# Patient Record
Sex: FEMALE | Race: ASIAN | NOT HISPANIC OR LATINO | URBAN - METROPOLITAN AREA
[De-identification: names, ages, dates, MRNs, and addresses within clinical notes are randomized per-mention and may not be internally consistent; named-entity substitution may affect disease eponyms.]

---

## 2017-05-19 ENCOUNTER — OUTPATIENT (OUTPATIENT)
Dept: OUTPATIENT SERVICES | Facility: HOSPITAL | Age: 57
LOS: 1 days | End: 2017-05-19
Payer: COMMERCIAL

## 2017-05-19 PROCEDURE — 73562 X-RAY EXAM OF KNEE 3: CPT

## 2017-05-19 PROCEDURE — 73562 X-RAY EXAM OF KNEE 3: CPT | Mod: 26,50

## 2017-06-07 ENCOUNTER — OUTPATIENT (OUTPATIENT)
Dept: OUTPATIENT SERVICES | Facility: HOSPITAL | Age: 57
LOS: 1 days | End: 2017-06-07
Payer: COMMERCIAL

## 2017-06-07 PROCEDURE — 77080 DXA BONE DENSITY AXIAL: CPT | Mod: 26

## 2017-06-07 PROCEDURE — 77080 DXA BONE DENSITY AXIAL: CPT

## 2017-08-24 ENCOUNTER — APPOINTMENT (OUTPATIENT)
Dept: OPHTHALMOLOGY | Facility: CLINIC | Age: 57
End: 2017-08-24

## 2017-10-30 ENCOUNTER — APPOINTMENT (OUTPATIENT)
Dept: ORTHOPEDIC SURGERY | Facility: CLINIC | Age: 57
End: 2017-10-30
Payer: COMMERCIAL

## 2017-10-30 VITALS — BODY MASS INDEX: 26 KG/M2 | WEIGHT: 129 LBS | HEIGHT: 59 IN

## 2017-10-30 VITALS — DIASTOLIC BLOOD PRESSURE: 78 MMHG | HEART RATE: 80 BPM | SYSTOLIC BLOOD PRESSURE: 125 MMHG

## 2017-10-30 DIAGNOSIS — Z85.9 PERSONAL HISTORY OF MALIGNANT NEOPLASM, UNSPECIFIED: ICD-10-CM

## 2017-10-30 DIAGNOSIS — M25.562 PAIN IN RIGHT KNEE: ICD-10-CM

## 2017-10-30 DIAGNOSIS — Z78.9 OTHER SPECIFIED HEALTH STATUS: ICD-10-CM

## 2017-10-30 DIAGNOSIS — Z87.39 PERSONAL HISTORY OF OTHER DISEASES OF THE MUSCULOSKELETAL SYSTEM AND CONNECTIVE TISSUE: ICD-10-CM

## 2017-10-30 DIAGNOSIS — M25.561 PAIN IN RIGHT KNEE: ICD-10-CM

## 2017-10-30 PROCEDURE — 73564 X-RAY EXAM KNEE 4 OR MORE: CPT | Mod: 50

## 2017-10-30 PROCEDURE — 20610 DRAIN/INJ JOINT/BURSA W/O US: CPT | Mod: 50

## 2017-10-30 PROCEDURE — 99203 OFFICE O/P NEW LOW 30 MIN: CPT | Mod: 25

## 2017-11-27 ENCOUNTER — APPOINTMENT (OUTPATIENT)
Dept: ORTHOPEDIC SURGERY | Facility: CLINIC | Age: 57
End: 2017-11-27

## 2017-11-28 ENCOUNTER — RX RENEWAL (OUTPATIENT)
Age: 57
End: 2017-11-28

## 2017-12-11 ENCOUNTER — APPOINTMENT (OUTPATIENT)
Dept: ORTHOPEDIC SURGERY | Facility: CLINIC | Age: 57
End: 2017-12-11
Payer: COMMERCIAL

## 2017-12-11 VITALS — WEIGHT: 129 LBS | BODY MASS INDEX: 26 KG/M2 | HEIGHT: 59 IN

## 2017-12-11 PROCEDURE — 99213 OFFICE O/P EST LOW 20 MIN: CPT

## 2017-12-18 ENCOUNTER — APPOINTMENT (OUTPATIENT)
Dept: ORTHOPEDIC SURGERY | Facility: CLINIC | Age: 57
End: 2017-12-18
Payer: COMMERCIAL

## 2017-12-18 VITALS
HEIGHT: 59 IN | HEART RATE: 105 BPM | SYSTOLIC BLOOD PRESSURE: 131 MMHG | WEIGHT: 130 LBS | BODY MASS INDEX: 26.21 KG/M2 | DIASTOLIC BLOOD PRESSURE: 84 MMHG

## 2017-12-18 PROCEDURE — 99214 OFFICE O/P EST MOD 30 MIN: CPT

## 2017-12-22 ENCOUNTER — APPOINTMENT (OUTPATIENT)
Dept: ORTHOPEDIC SURGERY | Facility: CLINIC | Age: 57
End: 2017-12-22

## 2017-12-26 ENCOUNTER — APPOINTMENT (OUTPATIENT)
Dept: ORTHOPEDIC SURGERY | Facility: CLINIC | Age: 57
End: 2017-12-26

## 2017-12-30 ENCOUNTER — OUTPATIENT (OUTPATIENT)
Dept: OUTPATIENT SERVICES | Facility: HOSPITAL | Age: 57
LOS: 1 days | End: 2017-12-30
Payer: COMMERCIAL

## 2017-12-30 PROCEDURE — 73221 MRI JOINT UPR EXTREM W/O DYE: CPT | Mod: 26,RT

## 2017-12-30 PROCEDURE — 73221 MRI JOINT UPR EXTREM W/O DYE: CPT

## 2018-01-02 ENCOUNTER — APPOINTMENT (OUTPATIENT)
Dept: ORTHOPEDIC SURGERY | Facility: CLINIC | Age: 58
End: 2018-01-02

## 2018-01-22 ENCOUNTER — APPOINTMENT (OUTPATIENT)
Dept: SURGERY | Facility: CLINIC | Age: 58
End: 2018-01-22

## 2018-02-05 ENCOUNTER — APPOINTMENT (OUTPATIENT)
Dept: ORTHOPEDIC SURGERY | Facility: CLINIC | Age: 58
End: 2018-02-05
Payer: COMMERCIAL

## 2018-02-05 VITALS — WEIGHT: 130 LBS | BODY MASS INDEX: 26.21 KG/M2 | HEIGHT: 59 IN

## 2018-02-05 PROCEDURE — 20610 DRAIN/INJ JOINT/BURSA W/O US: CPT | Mod: 50

## 2018-02-06 ENCOUNTER — APPOINTMENT (OUTPATIENT)
Dept: ORTHOPEDIC SURGERY | Facility: HOSPITAL | Age: 58
End: 2018-02-06

## 2018-02-12 ENCOUNTER — APPOINTMENT (OUTPATIENT)
Dept: ORTHOPEDIC SURGERY | Facility: CLINIC | Age: 58
End: 2018-02-12
Payer: COMMERCIAL

## 2018-02-12 VITALS — WEIGHT: 130 LBS | HEIGHT: 59 IN | BODY MASS INDEX: 26.21 KG/M2

## 2018-02-12 VITALS — WEIGHT: 130 LBS | BODY MASS INDEX: 26.21 KG/M2 | HEIGHT: 59 IN

## 2018-02-12 PROCEDURE — 20610 DRAIN/INJ JOINT/BURSA W/O US: CPT | Mod: 50

## 2018-02-20 ENCOUNTER — APPOINTMENT (OUTPATIENT)
Dept: MAMMOGRAPHY | Facility: HOSPITAL | Age: 58
End: 2018-02-20

## 2018-02-20 ENCOUNTER — APPOINTMENT (OUTPATIENT)
Dept: ULTRASOUND IMAGING | Facility: HOSPITAL | Age: 58
End: 2018-02-20

## 2018-02-26 ENCOUNTER — APPOINTMENT (OUTPATIENT)
Dept: ORTHOPEDIC SURGERY | Facility: CLINIC | Age: 58
End: 2018-02-26
Payer: COMMERCIAL

## 2018-02-26 VITALS — BODY MASS INDEX: 26.21 KG/M2 | HEIGHT: 59 IN | WEIGHT: 130 LBS

## 2018-02-26 PROCEDURE — 20610 DRAIN/INJ JOINT/BURSA W/O US: CPT | Mod: 50

## 2018-03-13 RX ORDER — TRAMADOL HYDROCHLORIDE 50 MG/1
50 TABLET, COATED ORAL
Qty: 40 | Refills: 0 | Status: DISCONTINUED | OUTPATIENT
Start: 2018-03-12 | End: 2018-03-13

## 2018-04-02 ENCOUNTER — OUTPATIENT (OUTPATIENT)
Dept: OUTPATIENT SERVICES | Facility: HOSPITAL | Age: 58
LOS: 1 days | End: 2018-04-02
Payer: COMMERCIAL

## 2018-04-02 PROCEDURE — 73120 X-RAY EXAM OF HAND: CPT

## 2018-04-02 PROCEDURE — 73120 X-RAY EXAM OF HAND: CPT | Mod: 26,50

## 2018-07-02 ENCOUNTER — OUTPATIENT (OUTPATIENT)
Dept: OUTPATIENT SERVICES | Facility: HOSPITAL | Age: 58
LOS: 1 days | End: 2018-07-02
Payer: COMMERCIAL

## 2018-07-02 ENCOUNTER — APPOINTMENT (OUTPATIENT)
Dept: MAMMOGRAPHY | Facility: HOSPITAL | Age: 58
End: 2018-07-02
Payer: COMMERCIAL

## 2018-07-02 ENCOUNTER — APPOINTMENT (OUTPATIENT)
Dept: ULTRASOUND IMAGING | Facility: HOSPITAL | Age: 58
End: 2018-07-02
Payer: COMMERCIAL

## 2018-07-02 PROCEDURE — 77067 SCR MAMMO BI INCL CAD: CPT | Mod: 26

## 2018-07-02 PROCEDURE — 76641 ULTRASOUND BREAST COMPLETE: CPT | Mod: 26,50

## 2018-07-02 PROCEDURE — 77063 BREAST TOMOSYNTHESIS BI: CPT | Mod: 26

## 2018-07-02 PROCEDURE — 77063 BREAST TOMOSYNTHESIS BI: CPT

## 2018-07-02 PROCEDURE — 77067 SCR MAMMO BI INCL CAD: CPT

## 2018-07-02 PROCEDURE — 76641 ULTRASOUND BREAST COMPLETE: CPT

## 2018-07-09 ENCOUNTER — APPOINTMENT (OUTPATIENT)
Dept: ORTHOPEDIC SURGERY | Facility: CLINIC | Age: 58
End: 2018-07-09
Payer: COMMERCIAL

## 2018-07-09 VITALS
HEIGHT: 59 IN | HEART RATE: 80 BPM | DIASTOLIC BLOOD PRESSURE: 84 MMHG | WEIGHT: 130 LBS | BODY MASS INDEX: 26.21 KG/M2 | SYSTOLIC BLOOD PRESSURE: 129 MMHG

## 2018-07-09 DIAGNOSIS — M21.161 VARUS DEFORMITY, NOT ELSEWHERE CLASSIFIED, RIGHT KNEE: ICD-10-CM

## 2018-07-09 DIAGNOSIS — M25.562 PAIN IN RIGHT KNEE: ICD-10-CM

## 2018-07-09 DIAGNOSIS — M21.162 VARUS DEFORMITY, NOT ELSEWHERE CLASSIFIED, LEFT KNEE: ICD-10-CM

## 2018-07-09 DIAGNOSIS — M25.561 PAIN IN RIGHT KNEE: ICD-10-CM

## 2018-07-09 PROCEDURE — 99214 OFFICE O/P EST MOD 30 MIN: CPT

## 2018-07-09 PROCEDURE — 73564 X-RAY EXAM KNEE 4 OR MORE: CPT | Mod: 50

## 2018-07-18 ENCOUNTER — OUTPATIENT (OUTPATIENT)
Dept: OUTPATIENT SERVICES | Facility: HOSPITAL | Age: 58
LOS: 1 days | End: 2018-07-18
Payer: COMMERCIAL

## 2018-07-18 DIAGNOSIS — Z22.321 CARRIER OR SUSPECTED CARRIER OF METHICILLIN SUSCEPTIBLE STAPHYLOCOCCUS AUREUS: ICD-10-CM

## 2018-07-18 PROCEDURE — 87641 MR-STAPH DNA AMP PROBE: CPT

## 2018-07-19 LAB
MRSA PCR RESULT.: NEGATIVE — SIGNIFICANT CHANGE UP
S AUREUS DNA NOSE QL NAA+PROBE: NEGATIVE — SIGNIFICANT CHANGE UP

## 2018-08-01 ENCOUNTER — RX RENEWAL (OUTPATIENT)
Age: 58
End: 2018-08-01

## 2018-08-15 ENCOUNTER — APPOINTMENT (OUTPATIENT)
Dept: SURGERY | Facility: CLINIC | Age: 58
End: 2018-08-15

## 2018-08-15 ENCOUNTER — OUTPATIENT (OUTPATIENT)
Dept: OUTPATIENT SERVICES | Facility: HOSPITAL | Age: 58
LOS: 1 days | End: 2018-08-15
Payer: COMMERCIAL

## 2018-08-15 VITALS
OXYGEN SATURATION: 97 % | TEMPERATURE: 99 F | HEART RATE: 65 BPM | SYSTOLIC BLOOD PRESSURE: 135 MMHG | DIASTOLIC BLOOD PRESSURE: 85 MMHG | WEIGHT: 126.1 LBS | RESPIRATION RATE: 16 BRPM

## 2018-08-15 DIAGNOSIS — Z98.890 OTHER SPECIFIED POSTPROCEDURAL STATES: Chronic | ICD-10-CM

## 2018-08-15 DIAGNOSIS — Z41.9 ENCOUNTER FOR PROCEDURE FOR PURPOSES OTHER THAN REMEDYING HEALTH STATE, UNSPECIFIED: Chronic | ICD-10-CM

## 2018-08-15 DIAGNOSIS — M17.0 BILATERAL PRIMARY OSTEOARTHRITIS OF KNEE: ICD-10-CM

## 2018-08-15 DIAGNOSIS — Z01.818 ENCOUNTER FOR OTHER PREPROCEDURAL EXAMINATION: ICD-10-CM

## 2018-08-15 LAB
ALBUMIN SERPL ELPH-MCNC: 3.9 G/DL — SIGNIFICANT CHANGE UP (ref 3.3–5)
ALP SERPL-CCNC: 49 U/L — SIGNIFICANT CHANGE UP (ref 40–120)
ALT FLD-CCNC: 10 U/L — SIGNIFICANT CHANGE UP (ref 10–45)
ANION GAP SERPL CALC-SCNC: 9 MMOL/L — SIGNIFICANT CHANGE UP (ref 5–17)
APPEARANCE UR: CLEAR — SIGNIFICANT CHANGE UP
APTT BLD: 31.1 SEC — SIGNIFICANT CHANGE UP (ref 27.5–37.4)
AST SERPL-CCNC: 13 U/L — SIGNIFICANT CHANGE UP (ref 10–40)
BACTERIA # UR AUTO: PRESENT /HPF
BILIRUB SERPL-MCNC: 0.6 MG/DL — SIGNIFICANT CHANGE UP (ref 0.2–1.2)
BILIRUB UR-MCNC: NEGATIVE — SIGNIFICANT CHANGE UP
BUN SERPL-MCNC: 16 MG/DL — SIGNIFICANT CHANGE UP (ref 7–23)
CALCIUM SERPL-MCNC: 9.5 MG/DL — SIGNIFICANT CHANGE UP (ref 8.4–10.5)
CHLORIDE SERPL-SCNC: 104 MMOL/L — SIGNIFICANT CHANGE UP (ref 96–108)
CO2 SERPL-SCNC: 31 MMOL/L — SIGNIFICANT CHANGE UP (ref 22–31)
COLOR SPEC: YELLOW — SIGNIFICANT CHANGE UP
CREAT SERPL-MCNC: 0.64 MG/DL — SIGNIFICANT CHANGE UP (ref 0.5–1.3)
DIFF PNL FLD: ABNORMAL
EPI CELLS # UR: SIGNIFICANT CHANGE UP /HPF (ref 0–5)
GLUCOSE SERPL-MCNC: 91 MG/DL — SIGNIFICANT CHANGE UP (ref 70–99)
GLUCOSE UR QL: NEGATIVE — SIGNIFICANT CHANGE UP
HCT VFR BLD CALC: 44.6 % — SIGNIFICANT CHANGE UP (ref 34.5–45)
HGB BLD-MCNC: 14.2 G/DL — SIGNIFICANT CHANGE UP (ref 11.5–15.5)
INR BLD: 0.96 — SIGNIFICANT CHANGE UP (ref 0.88–1.16)
KETONES UR-MCNC: NEGATIVE — SIGNIFICANT CHANGE UP
LEUKOCYTE ESTERASE UR-ACNC: NEGATIVE — SIGNIFICANT CHANGE UP
MCHC RBC-ENTMCNC: 31.8 G/DL — LOW (ref 32–36)
MCHC RBC-ENTMCNC: 31.8 PG — SIGNIFICANT CHANGE UP (ref 27–34)
MCV RBC AUTO: 100 FL — SIGNIFICANT CHANGE UP (ref 80–100)
NITRITE UR-MCNC: NEGATIVE — SIGNIFICANT CHANGE UP
PH UR: 6 — SIGNIFICANT CHANGE UP (ref 5–8)
PLATELET # BLD AUTO: 220 K/UL — SIGNIFICANT CHANGE UP (ref 150–400)
POTASSIUM SERPL-MCNC: 4 MMOL/L — SIGNIFICANT CHANGE UP (ref 3.5–5.3)
POTASSIUM SERPL-SCNC: 4 MMOL/L — SIGNIFICANT CHANGE UP (ref 3.5–5.3)
PROT SERPL-MCNC: 6.4 G/DL — SIGNIFICANT CHANGE UP (ref 6–8.3)
PROT UR-MCNC: NEGATIVE MG/DL — SIGNIFICANT CHANGE UP
PROTHROM AB SERPL-ACNC: 10.7 SEC — SIGNIFICANT CHANGE UP (ref 9.8–12.7)
RBC # BLD: 4.46 M/UL — SIGNIFICANT CHANGE UP (ref 3.8–5.2)
RBC # FLD: 13.2 % — SIGNIFICANT CHANGE UP (ref 10.3–16.9)
RBC CASTS # UR COMP ASSIST: < 5 /HPF — SIGNIFICANT CHANGE UP
SODIUM SERPL-SCNC: 144 MMOL/L — SIGNIFICANT CHANGE UP (ref 135–145)
SP GR SPEC: 1.02 — SIGNIFICANT CHANGE UP (ref 1–1.03)
UROBILINOGEN FLD QL: 0.2 E.U./DL — SIGNIFICANT CHANGE UP
WBC # BLD: 7.1 K/UL — SIGNIFICANT CHANGE UP (ref 3.8–10.5)
WBC # FLD AUTO: 7.1 K/UL — SIGNIFICANT CHANGE UP (ref 3.8–10.5)
WBC UR QL: < 5 /HPF — SIGNIFICANT CHANGE UP

## 2018-08-15 PROCEDURE — 71046 X-RAY EXAM CHEST 2 VIEWS: CPT | Mod: 26

## 2018-08-15 NOTE — H&P PST ADULT - HISTORY OF PRESENT ILLNESS
Patient reports she began having bilateral knee pain about 2 years ago. She reports she is unable to negotiate stairs. Has severe pain when changing position in her sleep. She notes reduced range of motion in both knees because of pain.

## 2018-08-15 NOTE — H&P PST ADULT - FAMILY HISTORY
Father  Still living? No  Hypertension, Age at diagnosis: Age Unknown  Type 2 diabetes mellitus, Age at diagnosis: Age Unknown  Hypercholesterolemia, Age at diagnosis: Age Unknown

## 2018-08-15 NOTE — PATIENT PROFILE ADULT. - PMH
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

## 2018-08-15 NOTE — H&P PST ADULT - NSANTHOSAYNRD_GEN_A_CORE
No. MEÑO screening performed.  STOP BANG Legend: 0-2 = LOW Risk; 3-4 = INTERMEDIATE Risk; 5-8 = HIGH Risk

## 2018-08-15 NOTE — H&P PST ADULT - PMH
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

## 2018-08-15 NOTE — H&P PST ADULT - REASON FOR ADMISSION
Bilateral knees degenerative arthritis, genu varum deformity. Patient is to have  bilateral total knee arthroplasty.

## 2018-08-15 NOTE — PATIENT PROFILE ADULT. - PSH
History of hernia repair  2010  Other postprocedural status  S/P appendectomy  Status post total hysterectomy  S/P DENNIS-BSO (total abdominal hysterectomy and bilateral salpingo-oophorectomy)  Surgery, elective  left shoulder arthroscopy- 2016

## 2018-08-16 PROBLEM — J45.909 UNSPECIFIED ASTHMA, UNCOMPLICATED: Chronic | Status: ACTIVE | Noted: 2018-08-15

## 2018-08-16 PROBLEM — I05.9 RHEUMATIC MITRAL VALVE DISEASE, UNSPECIFIED: Chronic | Status: ACTIVE | Noted: 2018-08-15

## 2018-08-16 PROBLEM — K21.9 GASTRO-ESOPHAGEAL REFLUX DISEASE WITHOUT ESOPHAGITIS: Chronic | Status: ACTIVE | Noted: 2018-08-15

## 2018-08-16 LAB
CULTURE RESULTS: NO GROWTH — SIGNIFICANT CHANGE UP
SPECIMEN SOURCE: SIGNIFICANT CHANGE UP

## 2018-08-27 VITALS
OXYGEN SATURATION: 99 % | WEIGHT: 127.43 LBS | TEMPERATURE: 98 F | SYSTOLIC BLOOD PRESSURE: 132 MMHG | HEIGHT: 56 IN | DIASTOLIC BLOOD PRESSURE: 83 MMHG | HEART RATE: 72 BPM | RESPIRATION RATE: 16 BRPM

## 2018-08-27 RX ORDER — POLYETHYLENE GLYCOL 3350 17 G/17G
17 POWDER, FOR SOLUTION ORAL DAILY
Qty: 0 | Refills: 0 | Status: DISCONTINUED | OUTPATIENT
Start: 2018-08-28 | End: 2018-08-31

## 2018-08-27 RX ORDER — HYDROMORPHONE HYDROCHLORIDE 2 MG/ML
0.5 INJECTION INTRAMUSCULAR; INTRAVENOUS; SUBCUTANEOUS EVERY 4 HOURS
Qty: 0 | Refills: 0 | Status: DISCONTINUED | OUTPATIENT
Start: 2018-08-28 | End: 2018-08-31

## 2018-08-27 RX ORDER — LOSARTAN POTASSIUM 100 MG/1
50 TABLET, FILM COATED ORAL DAILY
Qty: 0 | Refills: 0 | Status: DISCONTINUED | OUTPATIENT
Start: 2018-08-28 | End: 2018-08-31

## 2018-08-27 RX ORDER — SENNA PLUS 8.6 MG/1
2 TABLET ORAL AT BEDTIME
Qty: 0 | Refills: 0 | Status: DISCONTINUED | OUTPATIENT
Start: 2018-08-28 | End: 2018-08-31

## 2018-08-27 RX ORDER — MAGNESIUM HYDROXIDE 400 MG/1
30 TABLET, CHEWABLE ORAL DAILY
Qty: 0 | Refills: 0 | Status: DISCONTINUED | OUTPATIENT
Start: 2018-08-28 | End: 2018-08-31

## 2018-08-27 RX ORDER — LOSARTAN POTASSIUM 100 MG/1
0 TABLET, FILM COATED ORAL
Qty: 0 | Refills: 0 | COMMUNITY

## 2018-08-27 RX ORDER — ONDANSETRON 8 MG/1
4 TABLET, FILM COATED ORAL EVERY 6 HOURS
Qty: 0 | Refills: 0 | Status: DISCONTINUED | OUTPATIENT
Start: 2018-08-28 | End: 2018-08-31

## 2018-08-27 RX ORDER — PANTOPRAZOLE SODIUM 20 MG/1
40 TABLET, DELAYED RELEASE ORAL DAILY
Qty: 0 | Refills: 0 | Status: DISCONTINUED | OUTPATIENT
Start: 2018-08-28 | End: 2018-08-31

## 2018-08-27 RX ORDER — ENOXAPARIN SODIUM 100 MG/ML
40 INJECTION SUBCUTANEOUS EVERY 24 HOURS
Qty: 0 | Refills: 0 | Status: DISCONTINUED | OUTPATIENT
Start: 2018-08-29 | End: 2018-08-31

## 2018-08-27 RX ORDER — RANITIDINE HYDROCHLORIDE 150 MG/1
0 TABLET, FILM COATED ORAL
Qty: 0 | Refills: 0 | COMMUNITY

## 2018-08-27 RX ORDER — DOCUSATE SODIUM 100 MG
100 CAPSULE ORAL THREE TIMES A DAY
Qty: 0 | Refills: 0 | Status: DISCONTINUED | OUTPATIENT
Start: 2018-08-28 | End: 2018-08-31

## 2018-08-27 RX ORDER — ATORVASTATIN CALCIUM 80 MG/1
1 TABLET, FILM COATED ORAL
Qty: 0 | Refills: 0 | COMMUNITY

## 2018-08-27 RX ORDER — OXYCODONE HYDROCHLORIDE 5 MG/1
10 TABLET ORAL EVERY 4 HOURS
Qty: 0 | Refills: 0 | Status: DISCONTINUED | OUTPATIENT
Start: 2018-08-28 | End: 2018-08-31

## 2018-08-27 RX ORDER — SODIUM CHLORIDE 9 MG/ML
1000 INJECTION, SOLUTION INTRAVENOUS
Qty: 0 | Refills: 0 | Status: DISCONTINUED | OUTPATIENT
Start: 2018-08-28 | End: 2018-08-31

## 2018-08-27 RX ORDER — OXYCODONE HYDROCHLORIDE 5 MG/1
5 TABLET ORAL EVERY 4 HOURS
Qty: 0 | Refills: 0 | Status: DISCONTINUED | OUTPATIENT
Start: 2018-08-28 | End: 2018-08-31

## 2018-08-27 RX ORDER — ACETAMINOPHEN 500 MG
975 TABLET ORAL EVERY 8 HOURS
Qty: 0 | Refills: 0 | Status: DISCONTINUED | OUTPATIENT
Start: 2018-08-28 | End: 2018-08-31

## 2018-08-27 NOTE — H&P ADULT - NSHPLABSRESULTS_GEN_ALL_CORE
Left knee x-rays show severe tricompartmental DJD, medial joint space narrowing, sclerosis, marginal osteophytes present, varus deformity, lateral tibial subluxation, Kellgren and amina grade 4.  Right knee x-rays show severe tricompartmental DJD, medial joint space narrowing, sclerosis, marginal osteophytes present, varus deformity, lateral tibial subluxation, Kellgren and amina grade 4.

## 2018-08-27 NOTE — H&P ADULT - REASON FOR ADMISSION
Bilateral knee severe Arthritis with pain, swelling and decreased mobility for the last couple of years.

## 2018-08-27 NOTE — H&P ADULT - HISTORY OF PRESENT ILLNESS
57 y.o. F with h/o GERD, HTN, Hyperlipidemia and Bilateral Knee severe DJD with pain, swelling and decreased ambulation for many years presents at [facility] for her Bilateral TKR with Dr. Gardner.

## 2018-08-27 NOTE — PATIENT PROFILE ADULT. - PSH
History of hernia repair  2010  Other postprocedural status  S/P appendectomy  Status post total hysterectomy  S/P DENNIS-BSO (total abdominal hysterectomy and bilateral salpingo-oophorectomy)  Surgery, elective  left shoulder arthroscopy- 2016 History of hernia repair  2010  Other postprocedural status  S/P appendectomy  Status post total hysterectomy  S/P DENNIS-BSO (total abdominal hysterectomy and bilateral salpingo-oophorectomy)  Surgery, elective  left shoulder arthroscopy- 2016  BILAT SHOULDER

## 2018-08-27 NOTE — PATIENT PROFILE ADULT. - TEACHING/LEARNING LEARNING PREFERENCES
verbal instruction/written material/individual instruction written material/individual instruction/skill demonstration/verbal instruction

## 2018-08-28 ENCOUNTER — RESULT REVIEW (OUTPATIENT)
Age: 58
End: 2018-08-28

## 2018-08-28 ENCOUNTER — INPATIENT (INPATIENT)
Facility: HOSPITAL | Age: 58
LOS: 2 days | Discharge: NURSING, REHAB OR LONG-TERM CARE | DRG: 462 | End: 2018-08-31
Attending: ORTHOPAEDIC SURGERY | Admitting: ORTHOPAEDIC SURGERY
Payer: COMMERCIAL

## 2018-08-28 ENCOUNTER — TRANSCRIPTION ENCOUNTER (OUTPATIENT)
Age: 58
End: 2018-08-28

## 2018-08-28 ENCOUNTER — APPOINTMENT (OUTPATIENT)
Dept: ORTHOPEDIC SURGERY | Facility: HOSPITAL | Age: 58
End: 2018-08-28

## 2018-08-28 DIAGNOSIS — M17.0 BILATERAL PRIMARY OSTEOARTHRITIS OF KNEE: ICD-10-CM

## 2018-08-28 DIAGNOSIS — J45.20 MILD INTERMITTENT ASTHMA, UNCOMPLICATED: ICD-10-CM

## 2018-08-28 DIAGNOSIS — K42.9 UMBILICAL HERNIA WITHOUT OBSTRUCTION OR GANGRENE: ICD-10-CM

## 2018-08-28 DIAGNOSIS — I10 ESSENTIAL (PRIMARY) HYPERTENSION: ICD-10-CM

## 2018-08-28 DIAGNOSIS — I05.9 RHEUMATIC MITRAL VALVE DISEASE, UNSPECIFIED: ICD-10-CM

## 2018-08-28 DIAGNOSIS — Z41.9 ENCOUNTER FOR PROCEDURE FOR PURPOSES OTHER THAN REMEDYING HEALTH STATE, UNSPECIFIED: Chronic | ICD-10-CM

## 2018-08-28 DIAGNOSIS — K21.9 GASTRO-ESOPHAGEAL REFLUX DISEASE WITHOUT ESOPHAGITIS: ICD-10-CM

## 2018-08-28 DIAGNOSIS — Z98.890 OTHER SPECIFIED POSTPROCEDURAL STATES: Chronic | ICD-10-CM

## 2018-08-28 DIAGNOSIS — E78.5 HYPERLIPIDEMIA, UNSPECIFIED: ICD-10-CM

## 2018-08-28 LAB
ANION GAP SERPL CALC-SCNC: 11 MMOL/L — SIGNIFICANT CHANGE UP (ref 5–17)
BUN SERPL-MCNC: 14 MG/DL — SIGNIFICANT CHANGE UP (ref 7–23)
CALCIUM SERPL-MCNC: 9 MG/DL — SIGNIFICANT CHANGE UP (ref 8.4–10.5)
CHLORIDE SERPL-SCNC: 107 MMOL/L — SIGNIFICANT CHANGE UP (ref 96–108)
CO2 SERPL-SCNC: 28 MMOL/L — SIGNIFICANT CHANGE UP (ref 22–31)
CREAT SERPL-MCNC: 0.54 MG/DL — SIGNIFICANT CHANGE UP (ref 0.5–1.3)
GLUCOSE SERPL-MCNC: 131 MG/DL — HIGH (ref 70–99)
HCT VFR BLD CALC: 41 % — SIGNIFICANT CHANGE UP (ref 34.5–45)
HGB BLD-MCNC: 13.3 G/DL — SIGNIFICANT CHANGE UP (ref 11.5–15.5)
MCHC RBC-ENTMCNC: 31.7 PG — SIGNIFICANT CHANGE UP (ref 27–34)
MCHC RBC-ENTMCNC: 32.4 G/DL — SIGNIFICANT CHANGE UP (ref 32–36)
MCV RBC AUTO: 97.9 FL — SIGNIFICANT CHANGE UP (ref 80–100)
PLATELET # BLD AUTO: 226 K/UL — SIGNIFICANT CHANGE UP (ref 150–400)
POTASSIUM SERPL-MCNC: 3.7 MMOL/L — SIGNIFICANT CHANGE UP (ref 3.5–5.3)
POTASSIUM SERPL-SCNC: 3.7 MMOL/L — SIGNIFICANT CHANGE UP (ref 3.5–5.3)
RBC # BLD: 4.19 M/UL — SIGNIFICANT CHANGE UP (ref 3.8–5.2)
RBC # FLD: 12.7 % — SIGNIFICANT CHANGE UP (ref 10.3–16.9)
SODIUM SERPL-SCNC: 146 MMOL/L — HIGH (ref 135–145)
WBC # BLD: 7.1 K/UL — SIGNIFICANT CHANGE UP (ref 3.8–10.5)
WBC # FLD AUTO: 7.1 K/UL — SIGNIFICANT CHANGE UP (ref 3.8–10.5)

## 2018-08-28 PROCEDURE — 27447 TOTAL KNEE ARTHROPLASTY: CPT | Mod: 59,LT

## 2018-08-28 PROCEDURE — 73560 X-RAY EXAM OF KNEE 1 OR 2: CPT | Mod: 26,50

## 2018-08-28 RX ORDER — CEFAZOLIN SODIUM 1 G
2000 VIAL (EA) INJECTION EVERY 8 HOURS
Qty: 0 | Refills: 0 | Status: COMPLETED | OUTPATIENT
Start: 2018-08-28 | End: 2018-08-29

## 2018-08-28 RX ORDER — OXYCODONE HYDROCHLORIDE 5 MG/1
15 TABLET ORAL EVERY 12 HOURS
Qty: 0 | Refills: 0 | Status: DISCONTINUED | OUTPATIENT
Start: 2018-08-28 | End: 2018-08-31

## 2018-08-28 RX ORDER — ENOXAPARIN SODIUM 100 MG/ML
0 INJECTION SUBCUTANEOUS
Qty: 0 | Refills: 0 | COMMUNITY
Start: 2018-08-28

## 2018-08-28 RX ORDER — BUPIVACAINE 13.3 MG/ML
20 INJECTION, SUSPENSION, LIPOSOMAL INFILTRATION ONCE
Qty: 0 | Refills: 0 | Status: DISCONTINUED | OUTPATIENT
Start: 2018-08-28 | End: 2018-08-31

## 2018-08-28 RX ORDER — CEFAZOLIN SODIUM 1 G
2000 VIAL (EA) INJECTION EVERY 8 HOURS
Qty: 0 | Refills: 0 | Status: DISCONTINUED | OUTPATIENT
Start: 2018-08-28 | End: 2018-08-28

## 2018-08-28 RX ORDER — ACETAMINOPHEN 500 MG
1000 TABLET ORAL ONCE
Qty: 0 | Refills: 0 | Status: COMPLETED | OUTPATIENT
Start: 2018-08-28 | End: 2018-08-28

## 2018-08-28 RX ORDER — ENOXAPARIN SODIUM 100 MG/ML
0 INJECTION SUBCUTANEOUS
Qty: 0 | Refills: 0 | DISCHARGE
Start: 2018-08-28

## 2018-08-28 RX ADMIN — Medication 100 MILLIGRAM(S): at 21:24

## 2018-08-28 RX ADMIN — Medication 2000 MILLIGRAM(S): at 21:24

## 2018-08-28 RX ADMIN — OXYCODONE HYDROCHLORIDE 15 MILLIGRAM(S): 5 TABLET ORAL at 18:18

## 2018-08-28 RX ADMIN — Medication 975 MILLIGRAM(S): at 19:12

## 2018-08-28 RX ADMIN — Medication 1000 MILLIGRAM(S): at 17:09

## 2018-08-28 RX ADMIN — Medication 400 MILLIGRAM(S): at 17:10

## 2018-08-28 RX ADMIN — Medication 975 MILLIGRAM(S): at 18:18

## 2018-08-28 RX ADMIN — OXYCODONE HYDROCHLORIDE 15 MILLIGRAM(S): 5 TABLET ORAL at 19:12

## 2018-08-28 NOTE — PHYSICAL THERAPY INITIAL EVALUATION ADULT - GENERAL OBSERVATIONS, REHAB EVAL
Patient received in semi-mac position, no apparent distress, .hemovac x 2, +telemetry, +intravenous line, +sequential pneumatic compression device.

## 2018-08-28 NOTE — DISCHARGE NOTE ADULT - MEDICATION SUMMARY - MEDICATIONS TO TAKE
I will START or STAY ON the medications listed below when I get home from the hospital:    oxyCODONE 5 mg oral tablet  -- 1 tab(s) by mouth every 4 hours, As needed, moderate pain (4-6)  -- Indication: For Moderate pain    oxyCODONE 10 mg oral tablet  -- 1 tab(s) by mouth every 4 hours, As needed, severe pain (7-10)  -- Indication: For severe pain    celecoxib 200 mg oral capsule  -- 1 cap(s) by mouth 2 times a day  -- Indication: For antiinflammatory, pain control    acetaminophen 325 mg oral tablet  -- 3 tab(s) by mouth every 8 hours, standing pain control  -- Indication: For standing pain control    enoxaparin  -- 40mg subcutaneous injection daily for 14 days after surgery.  Last day of administration: 9/11/18.  Then start ecotrin 325mg mg bid fo an additional weeks  -- Indication: For dvt ppx    atorvastatin 20 mg oral tablet  -- 1 tab(s) by mouth once a day  -- Indication: For HLD    losartan-hydrochlorothiazide 50mg-12.5mg oral tablet  -- 1 tab(s) by mouth once a day  -- Indication: For Hyperlipidemia, unspecified hyperlipidemia type    bisacodyl 10 mg rectal suppository  -- 1 suppository(ies) rectally once a day, As needed, If no bowel movement by postoperative day #2  -- Indication: For constipation    docusate sodium 100 mg oral capsule  -- 1 cap(s) by mouth 3 times a day  -- Indication: For constipation    polyethylene glycol 3350 oral powder for reconstitution  -- 17 gram(s) by mouth once a day  -- Indication: For constipation    senna oral tablet  -- 2 tab(s) by mouth once a day (at bedtime), As needed, Constipation  -- Indication: For constipation    pantoprazole 40 mg oral delayed release tablet  -- 1 tab(s) by mouth once a day  -- Indication: For supplement    Vitamin D3  -- 1 tablet by mouth once daily  -- Indication: For supplement

## 2018-08-28 NOTE — PHYSICAL THERAPY INITIAL EVALUATION ADULT - GAIT DEVIATIONS NOTED, PT EVAL
decreased casey/increased time in double stance/decreased weight-shifting ability/decreased stride length

## 2018-08-28 NOTE — DISCHARGE NOTE ADULT - NS AS ACTIVITY OBS
Walking-Indoors allowed/No Heavy lifting/straining/Showering allowed/Do not drive or operate machinery/Walking-Outdoors allowed/Stairs allowed

## 2018-08-28 NOTE — PHYSICAL THERAPY INITIAL EVALUATION ADULT - IMPAIRED TRANSFERS: SIT/STAND, REHAB EVAL
impaired postural control/decreased ROM/impaired sensory feedback/decreased strength/pain/impaired motor control/impaired balance

## 2018-08-28 NOTE — DISCHARGE NOTE ADULT - MEDICATION SUMMARY - MEDICATIONS TO STOP TAKING
I will STOP taking the medications listed below when I get home from the hospital:    Turmeric 500 mg oral capsule  -- 1 cap(s) by mouth once a day    vitamin E  -- 1 tablet by mouth once daily    TUNG SHUEH  -- 6 tab(s) by mouth once a day

## 2018-08-28 NOTE — DISCHARGE NOTE ADULT - PATIENT PORTAL LINK FT
You can access the [email or web address] Patient Portal, offered by [facility], by registering with the following website: [email or web address]

## 2018-08-28 NOTE — PHYSICAL THERAPY INITIAL EVALUATION ADULT - IMPAIRMENTS CONTRIBUTING TO GAIT DEVIATIONS, PT EVAL
decreased strength/impaired sensory feedback/impaired postural control/impaired motor control/pain/impaired balance/decreased ROM

## 2018-08-28 NOTE — DISCHARGE NOTE ADULT - HOSPITAL COURSE
Admitted  Surgery  Genet-op Antibiotics  Pain control  DVT prophylaxis  OOB/Physical Therapy Admitted 8/28/18  Surgery 8/28/18- b/l TKA  Genet-op Antibiotics  Pain control  DVT prophylaxis  OOB/Physical Therapy/ OT  med c/s

## 2018-08-28 NOTE — PHYSICAL THERAPY INITIAL EVALUATION ADULT - CRITERIA FOR SKILLED THERAPEUTIC INTERVENTIONS
impairments found/rehab potential/risk reduction/prevention/functional limitations in following categories/therapy frequency/anticipated discharge recommendation

## 2018-08-28 NOTE — PHYSICAL THERAPY INITIAL EVALUATION ADULT - BALANCE DISTURBANCE, IDENTIFIED IMPAIRMENT CONTRIBUTE, REHAB EVAL
impaired motor control/decreased ROM/impaired sensory feedback/decreased strength/pain/impaired postural control

## 2018-08-28 NOTE — DISCHARGE NOTE ADULT - ADDITIONAL INSTRUCTIONS
No strenuous activity, heavy lifting, driving or returning to work until cleared by MD.  You may shower - dressing is water-resistant, no soaking in bathtubs.  Dressing to be removed after post op day 5-7, then can leave incision open to air. Keep incision clean and dry.  Any staples/sutures should be removed in 10-14 days.  Try to have regular bowel movements, take stool softener or laxative if necessary.  May take Pepcid or Zantac for upset stomach.  May take Aleve or Naproxen instead of Celebrex.  Swelling may travel all the way down leg to foot, this is normal and will subside in a few weeks.  Call to schedule an appt with Dr. Gardner for follow up after discharge, usually 2-3 weeks postop.  Contact doctor if you experience: fever greater than 101.5, chills, chest pain, difficulty breathing, redness or excessive drainage around the incision, other concerns.  Follow up with primary care provider. ***DVT prophylaxis lovenox 40mg subcutaneous daily for 14 days postop, then stop and start Aspirin 325mg PO BID for 4 weeks***  No strenuous activity, heavy lifting, driving or returning to work until cleared by MD.  You may shower - dressing is water-resistant, no soaking in bathtubs.  Dressings to be removed after post op day 7, then can leave incision open to air. Keep incision clean and dry.  Any staples/sutures should be removed in 10-14 days.  Try to have regular bowel movements, take stool softener or laxative if necessary.  May take Pepcid or Zantac for upset stomach.  May take Aleve or Naproxen instead of Celebrex.  Swelling may travel all the way down leg to foot, this is normal and will subside in a few weeks.  Call to schedule an appt with Dr. Gardner for follow up after discharge, usually 2-3 weeks postop.  Contact doctor if you experience: fever greater than 101.5, chills, chest pain, difficulty breathing, redness or excessive drainage around the incision, other concerns.  Follow up with primary care provider. ***DVT prophylaxis lovenox 40mg subcutaneous daily for 14 days postop, then stop and start Aspirin 325mg PO BID for 4 weeks***  No strenuous activity, heavy lifting, driving or returning to work until cleared by MD.  You may shower - dressing is water-resistant, no soaking in bathtubs.  Dressings to be removed after post op day 10, then can leave incision open to air. Keep incision clean and dry.  Any staples/sutures should be removed in 10-14 days.  Try to have regular bowel movements, take stool softener or laxative if necessary.  May take Pepcid or Zantac for upset stomach.  Swelling may travel all the way down leg to foot, this is normal and will subside in a few weeks.  Call to schedule an appt with Dr. Gardner for follow up after discharge, usually 2-3 weeks postop.  Contact doctor if you experience: fever greater than 101.5, chills, chest pain, difficulty breathing, redness or excessive drainage around the incision, other concerns.  Follow up with primary care provider.

## 2018-08-28 NOTE — CONSULT NOTE ADULT - SUBJECTIVE AND OBJECTIVE BOX
HPI:  57 year old female with Bilateral knees degenerative arthritis, genu varum deformity, decreased ROM and unsteady gait.  X rays confirm diagnosis. Patient is to have  bilateral total knee arthroplasty.  Patient reports she began having bilateral knee pain about 2 years ago. She reports she is unable to negotiate stairs and symptoms worse after prolonged immobility. She has severe pain when changing position in her sleep. She notes reduced range of motion in both knees because of pain.      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  Acid reflux  Asthma  Malignant neoplasm of ovary: Ovarian cancer  Umbilical hernia: Umbilical hernia  Breast lump: Breast mass in female  History of hernia repair:   Surgery, elective: left shoulder arthroscopy- 2016  Other postprocedural status: S/P appendectomy  Status post total hysterectomy: S/P DENNIS-BSO (total abdominal hysterectomy and bilateral salpingo-oophorectomy)      REVIEW OF SYSTEMS    General:  normal  Skin/Breast: normal  Ophthalmologic: negative  ENMT:	normal  Respiratory and Thorax: normal  Cardiovascular:	normal  Gastrointestinal:	normal  Genitourinary:	normal  Musculoskeletal:	 bilateral knee swelling   Neurological:	normal  Psychiatric:	normal  Hematology/Lymphatics:	 negative  Endocrine:	negative  Allergic/Immunologic:	negative      MEDICATIONS     Zantac: Last Dose Taken:  , 1 tablet orally as needed  · 	losartan: Last Dose Taken:  , 1 tablet orally once daily  · 	atorvastatin 10 mg oral tablet: Last Dose Taken:  , 1 tab(s) orally once a day  · 	Turmeric 500 mg oral capsule: Last Dose Taken:  , 1 cap(s) orally once a day  · 	Vitamin D3: Last Dose Taken:  , 1 tablet orally once daily  · 	vitamin E: Last Dose Taken:  , 1 tablet orally once daily    Allergies    No Known Allergies    SOCIAL HISTORY: no cigs social alcohol    FAMILY HISTORY:  Hypercholesterolemia (Father): Father, .  Type 2 diabetes mellitus (Father): Father, .  Hypertension (Father): Father, who is .    PHYSICAL EXAM:     Vital Signs Last 24 Hrs  T(C): --  T(F): --98.6  HR: --72  BP: --120/80  BP(mean): --  RR: --16  SpO2: --    Constitutional: WDWNF in NAD  Eyes: conj pink  ENMT: negative  Neck: supple  Breasts: not examined   Back: negative  Respiratory: clear to P&A  Cardiovascular: no MRGT or H  Gastrointestinal: normal bowel sounds  Genitourinary: neg  Rectal: not examined  Extremities: normal  Vascular: normal  Neurological: normal  Skin: negative  Lymph Nodes: negative  Musculoskeletal:   decreased ROM bilateral knees    Psychiatric: anxiety

## 2018-08-28 NOTE — PHYSICAL THERAPY INITIAL EVALUATION ADULT - ACTIVE RANGE OF MOTION EXAMINATION, REHAB EVAL
bilateral upper extremity Active ROM was WFL (within functional limits)/Right knee betwee 5-50 degrees; Left knee between 5-90 degrees

## 2018-08-28 NOTE — DISCHARGE NOTE ADULT - CARE PLAN
Principal Discharge DX:	Primary osteoarthritis of both knees  Goal:	improvement s/p B/L TKR  Assessment and plan of treatment:	see below

## 2018-08-28 NOTE — DISCHARGE NOTE ADULT - CARE PROVIDER_API CALL
Miguel Gardner), Orthopaedic Surgery  [facility] [place] [address]  Phone: [phone]  Fax: [phone]

## 2018-08-28 NOTE — PHYSICAL THERAPY INITIAL EVALUATION ADULT - ADDITIONAL COMMENTS
Distant use of cane upon shoulder RC surgery (not recent use). 3rd floor walk up. No hx of falls. Lives with friends one of which is retired.

## 2018-08-29 LAB
ANION GAP SERPL CALC-SCNC: 8 MMOL/L — SIGNIFICANT CHANGE UP (ref 5–17)
BUN SERPL-MCNC: 15 MG/DL — SIGNIFICANT CHANGE UP (ref 7–23)
CALCIUM SERPL-MCNC: 9 MG/DL — SIGNIFICANT CHANGE UP (ref 8.4–10.5)
CHLORIDE SERPL-SCNC: 105 MMOL/L — SIGNIFICANT CHANGE UP (ref 96–108)
CO2 SERPL-SCNC: 29 MMOL/L — SIGNIFICANT CHANGE UP (ref 22–31)
CREAT SERPL-MCNC: 0.56 MG/DL — SIGNIFICANT CHANGE UP (ref 0.5–1.3)
GLUCOSE SERPL-MCNC: 133 MG/DL — HIGH (ref 70–99)
HCT VFR BLD CALC: 38.6 % — SIGNIFICANT CHANGE UP (ref 34.5–45)
HGB BLD-MCNC: 12.3 G/DL — SIGNIFICANT CHANGE UP (ref 11.5–15.5)
MCHC RBC-ENTMCNC: 31.4 PG — SIGNIFICANT CHANGE UP (ref 27–34)
MCHC RBC-ENTMCNC: 31.9 G/DL — LOW (ref 32–36)
MCV RBC AUTO: 98.5 FL — SIGNIFICANT CHANGE UP (ref 80–100)
PLATELET # BLD AUTO: 185 K/UL — SIGNIFICANT CHANGE UP (ref 150–400)
POTASSIUM SERPL-MCNC: 4.6 MMOL/L — SIGNIFICANT CHANGE UP (ref 3.5–5.3)
POTASSIUM SERPL-SCNC: 4.6 MMOL/L — SIGNIFICANT CHANGE UP (ref 3.5–5.3)
RBC # BLD: 3.92 M/UL — SIGNIFICANT CHANGE UP (ref 3.8–5.2)
RBC # FLD: 12.8 % — SIGNIFICANT CHANGE UP (ref 10.3–16.9)
SODIUM SERPL-SCNC: 142 MMOL/L — SIGNIFICANT CHANGE UP (ref 135–145)
WBC # BLD: 10.2 K/UL — SIGNIFICANT CHANGE UP (ref 3.8–10.5)
WBC # FLD AUTO: 10.2 K/UL — SIGNIFICANT CHANGE UP (ref 3.8–10.5)

## 2018-08-29 RX ORDER — CELECOXIB 200 MG/1
200 CAPSULE ORAL
Qty: 0 | Refills: 0 | Status: DISCONTINUED | OUTPATIENT
Start: 2018-08-29 | End: 2018-08-31

## 2018-08-29 RX ORDER — HYDROCHLOROTHIAZIDE 25 MG
12.5 TABLET ORAL DAILY
Qty: 0 | Refills: 0 | Status: DISCONTINUED | OUTPATIENT
Start: 2018-08-29 | End: 2018-08-31

## 2018-08-29 RX ORDER — BACLOFEN 100 %
10 POWDER (GRAM) MISCELLANEOUS THREE TIMES A DAY
Qty: 0 | Refills: 0 | Status: DISCONTINUED | OUTPATIENT
Start: 2018-08-29 | End: 2018-08-31

## 2018-08-29 RX ORDER — ATORVASTATIN CALCIUM 80 MG/1
20 TABLET, FILM COATED ORAL AT BEDTIME
Qty: 0 | Refills: 0 | Status: DISCONTINUED | OUTPATIENT
Start: 2018-08-29 | End: 2018-08-31

## 2018-08-29 RX ADMIN — OXYCODONE HYDROCHLORIDE 15 MILLIGRAM(S): 5 TABLET ORAL at 19:45

## 2018-08-29 RX ADMIN — POLYETHYLENE GLYCOL 3350 17 GRAM(S): 17 POWDER, FOR SOLUTION ORAL at 12:35

## 2018-08-29 RX ADMIN — Medication 10 MILLIGRAM(S): at 18:29

## 2018-08-29 RX ADMIN — LOSARTAN POTASSIUM 50 MILLIGRAM(S): 100 TABLET, FILM COATED ORAL at 06:03

## 2018-08-29 RX ADMIN — Medication 100 MILLIGRAM(S): at 12:40

## 2018-08-29 RX ADMIN — ENOXAPARIN SODIUM 40 MILLIGRAM(S): 100 INJECTION SUBCUTANEOUS at 18:29

## 2018-08-29 RX ADMIN — Medication 975 MILLIGRAM(S): at 06:03

## 2018-08-29 RX ADMIN — Medication 975 MILLIGRAM(S): at 21:31

## 2018-08-29 RX ADMIN — OXYCODONE HYDROCHLORIDE 15 MILLIGRAM(S): 5 TABLET ORAL at 06:03

## 2018-08-29 RX ADMIN — CELECOXIB 200 MILLIGRAM(S): 200 CAPSULE ORAL at 19:45

## 2018-08-29 RX ADMIN — OXYCODONE HYDROCHLORIDE 15 MILLIGRAM(S): 5 TABLET ORAL at 07:00

## 2018-08-29 RX ADMIN — Medication 975 MILLIGRAM(S): at 07:00

## 2018-08-29 RX ADMIN — HYDROMORPHONE HYDROCHLORIDE 0.5 MILLIGRAM(S): 2 INJECTION INTRAMUSCULAR; INTRAVENOUS; SUBCUTANEOUS at 20:15

## 2018-08-29 RX ADMIN — SODIUM CHLORIDE 70 MILLILITER(S): 9 INJECTION, SOLUTION INTRAVENOUS at 01:43

## 2018-08-29 RX ADMIN — Medication 100 MILLIGRAM(S): at 21:31

## 2018-08-29 RX ADMIN — HYDROMORPHONE HYDROCHLORIDE 0.5 MILLIGRAM(S): 2 INJECTION INTRAMUSCULAR; INTRAVENOUS; SUBCUTANEOUS at 19:31

## 2018-08-29 RX ADMIN — ATORVASTATIN CALCIUM 20 MILLIGRAM(S): 80 TABLET, FILM COATED ORAL at 21:31

## 2018-08-29 RX ADMIN — CELECOXIB 200 MILLIGRAM(S): 200 CAPSULE ORAL at 18:29

## 2018-08-29 RX ADMIN — Medication 100 MILLIGRAM(S): at 06:03

## 2018-08-29 RX ADMIN — Medication 2000 MILLIGRAM(S): at 06:04

## 2018-08-29 RX ADMIN — Medication 975 MILLIGRAM(S): at 22:30

## 2018-08-29 RX ADMIN — HYDROMORPHONE HYDROCHLORIDE 0.5 MILLIGRAM(S): 2 INJECTION INTRAMUSCULAR; INTRAVENOUS; SUBCUTANEOUS at 13:30

## 2018-08-29 RX ADMIN — Medication 975 MILLIGRAM(S): at 12:40

## 2018-08-29 RX ADMIN — PANTOPRAZOLE SODIUM 40 MILLIGRAM(S): 20 TABLET, DELAYED RELEASE ORAL at 12:33

## 2018-08-29 RX ADMIN — OXYCODONE HYDROCHLORIDE 15 MILLIGRAM(S): 5 TABLET ORAL at 18:29

## 2018-08-29 RX ADMIN — HYDROMORPHONE HYDROCHLORIDE 0.5 MILLIGRAM(S): 2 INJECTION INTRAMUSCULAR; INTRAVENOUS; SUBCUTANEOUS at 12:33

## 2018-08-29 RX ADMIN — Medication 975 MILLIGRAM(S): at 13:40

## 2018-08-29 NOTE — PROGRESS NOTE ADULT - SUBJECTIVE AND OBJECTIVE BOX
HPI:  57 year old female with Bilateral knees degenerative arthritis, genu varum deformity, decreased ROM and unsteady gait.  X rays confirm diagnosis. Patient is to have  bilateral total knee arthroplasty.  Patient reports she began having bilateral knee pain about 2 years ago. She reports she is unable to negotiate stairs and symptoms worse after prolonged immobility. She has severe pain when changing position in her sleep. She notes reduced range of motion in both knees because of pain.  Patient day #1 post op bilateral TKR with moderate bilateral knee pain kathy malaise.    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 975 milliGRAM(s) Oral every 8 hours  BUpivacaine liposome 1.3% Injectable (no eMAR) 20 milliLiter(s) Local Injection once  docusate sodium 100 milliGRAM(s) Oral three times a day  enoxaparin Injectable 40 milliGRAM(s) SubCutaneous every 24 hours  lactated ringers. 1000 milliLiter(s) (70 mL/Hr) IV Continuous <Continuous>  losartan 50 milliGRAM(s) Oral daily  oxyCODONE  ER Tablet 15 milliGRAM(s) Oral every 12 hours  pantoprazole    Tablet 40 milliGRAM(s) Oral daily  polyethylene glycol 3350 17 Gram(s) Oral daily    MEDICATIONS  (PRN):  aluminum hydroxide/magnesium hydroxide/simethicone Suspension 30 milliLiter(s) Oral four times a day PRN Indigestion  HYDROmorphone  Injectable 0.5 milliGRAM(s) IV Push every 4 hours PRN Severe Pain  magnesium hydroxide Suspension 30 milliLiter(s) Oral daily PRN Constipation  ondansetron Injectable 4 milliGRAM(s) IV Push every 6 hours PRN Nausea and/or Vomiting  oxyCODONE    IR 5 milliGRAM(s) Oral every 4 hours PRN Mild Pain  oxyCODONE    IR 10 milliGRAM(s) Oral every 4 hours PRN Moderate Pain  senna 2 Tablet(s) Oral at bedtime PRN Constipation      Allergies    No Known Allergies    REVIEW OF SYSTEMS    General:  malaise	  Skin/Breast: normal  Ophthalmologic: negative  ENMT:	normal  Respiratory and Thorax: normal  Cardiovascular:	normal  Gastrointestinal:	normal  Genitourinary:	normal  Musculoskeletal:	 bilateral knee swelling   Neurological:	normal  Psychiatric:	normal  Hematology/Lymphatics:	 negative  Endocrine:	negative  Allergic/Immunologic:	negative      PHYSICAL EXAM:    Vital Signs Last 24 Hrs  T(C): 35.5 (29 Aug 2018 05:10), Max: 37.1 (28 Aug 2018 15:05)  T(F): 95.9 (29 Aug 2018 05:10), Max: 98.8 (28 Aug 2018 15:05)  HR: 55 (29 Aug 2018 05:10) (55 - 76)  BP: 124/65 (29 Aug 2018 05:10) (98/56 - 124/65)  BP(mean): 82 (28 Aug 2018 17:00) (73 - 88)  RR: 16 (29 Aug 2018 05:10) (16 - 24)  SpO2: 99% (29 Aug 2018 05:10) (96% - 99%)    Constitutional: WDWNF   Eyes: conj pale  ENMT: negative  Neck: supple  Breasts: not examined   Back: negative  Respiratory: clear to P&A  Cardiovascular: no MRGT or H  Gastrointestinal: normal bowel sounds  Genitourinary: neg  Rectal: not examined  Extremities: normal  Vascular: normal  Neurological: normal  Skin: negative  Lymph Nodes: negative  Musculoskeletal:   decreased ROM  bilateral knees  Psychiatric: anxiety                        13.3   7.1   )-----------( 226      ( 28 Aug 2018 15:18 )             41.0       08-28    146<H>  |  107  |  14  ----------------------------<  131<H>  3.7   |  28  |  0.54    Ca    9.0      28 Aug 2018 15:18

## 2018-08-29 NOTE — OCCUPATIONAL THERAPY INITIAL EVALUATION ADULT - LEVEL OF INDEPENDENCE: SUPINE/SIT, REHAB EVAL
contact guard contact guard/HOB elevated, using bed rail - otherwise patient would have required Bethanie with HOB flat/no bed rail

## 2018-08-29 NOTE — OCCUPATIONAL THERAPY INITIAL EVALUATION ADULT - PERTINENT HX OF CURRENT PROBLEM, REHAB EVAL
57 y.o. F with h/o GERD, HTN, Hyperlipidemia and Bilateral Knee severe DJD with pain, swelling and decreased ambulation for many years presents at [facility] for her Bilateral TKR with Dr. Gardner. S/P bilateral TKR 8/28.

## 2018-08-29 NOTE — OCCUPATIONAL THERAPY INITIAL EVALUATION ADULT - GENERAL OBSERVATIONS, REHAB EVAL
Right hand dominant. Patient cleared for Occupational Therapy by QUINTEN Benavides, patient premedicated prior to session. Patient received supine in semi-mac position in non-acute distress, friends present at bedside. +Tele, +IV heplock, + bilateral knee acewrap with + bilateral knee cryocuffs, + bilateral sequential compression devices. Patient denies pain at rest, c/o bilateral knee pain rated 4/10 with activity.

## 2018-08-29 NOTE — PROGRESS NOTE ADULT - SUBJECTIVE AND OBJECTIVE BOX
Ortho Note    Patient had bilateral total knee replacement on 8/28/18 and is willing and capable of tolerating 3 hours of acute rehab daily. Patient also will require ambulance transport to facility due to current condition.     Bobby Dubon PA-C  [phone]

## 2018-08-29 NOTE — OCCUPATIONAL THERAPY INITIAL EVALUATION ADULT - PLANNED THERAPY INTERVENTIONS, OT EVAL
balance training/ADL retraining/bed mobility training/parent/caregiver training.../transfer training/IADL retraining

## 2018-08-29 NOTE — PROGRESS NOTE ADULT - SUBJECTIVE AND OBJECTIVE BOX
POST OPERATIVE DAY #:  [ ]   STATUS POST: [ ] Left [ ] Right  TKA/THR                        SUBJECTIVE: Patient seen and examined.  doing well. pain well controlled.  drains removed.  ready to work with PT.  denies CP/SOB.  no issues/concerns        OBJECTIVE:     Vital Signs Last 24 Hrs  T(C): 36.6 (29 Aug 2018 09:19), Max: 37.1 (28 Aug 2018 15:05)  T(F): 97.8 (29 Aug 2018 09:19), Max: 98.8 (28 Aug 2018 15:05)  HR: 76 (29 Aug 2018 09:19) (55 - 76)  BP: 124/67 (29 Aug 2018 09:19) (98/56 - 124/67)  BP(mean): 82 (28 Aug 2018 17:00) (73 - 88)  RR: 15 (29 Aug 2018 09:19) (15 - 24)  SpO2: 97% (29 Aug 2018 09:19) (96% - 99%)    Affected extremity:          Dressing: [ ] clean/dry/intact  [ ] Other:           Sensation: [ ] intact to light touch  [ ] decreased:          Motor exam: [  ] / 5 Tibialis Anterior/Gastrocnemius-Soleus          [ ] warm well perfused; capillary refill <3 seconds     LABS:                        12.3   10.2  )-----------( 185      ( 29 Aug 2018 06:19 )             38.6     08-29    142  |  105  |  15  ----------------------------<  133<H>  4.6   |  29  |  0.56    Ca    9.0      29 Aug 2018 06:19            MEDICATIONS:acetaminophen   Tablet. 975 milliGRAM(s) Oral every 8 hours  aluminum hydroxide/magnesium hydroxide/simethicone Suspension 30 milliLiter(s) Oral four times a day PRN  baclofen 10 milliGRAM(s) Oral three times a day PRN  BUpivacaine liposome 1.3% Injectable (no eMAR) 20 milliLiter(s) Local Injection once  celecoxib 200 milliGRAM(s) Oral two times a day  docusate sodium 100 milliGRAM(s) Oral three times a day  enoxaparin Injectable 40 milliGRAM(s) SubCutaneous every 24 hours  HYDROmorphone  Injectable 0.5 milliGRAM(s) IV Push every 4 hours PRN  lactated ringers. 1000 milliLiter(s) IV Continuous <Continuous>  losartan 50 milliGRAM(s) Oral daily  magnesium hydroxide Suspension 30 milliLiter(s) Oral daily PRN  ondansetron Injectable 4 milliGRAM(s) IV Push every 6 hours PRN  oxyCODONE    IR 5 milliGRAM(s) Oral every 4 hours PRN  oxyCODONE    IR 10 milliGRAM(s) Oral every 4 hours PRN  oxyCODONE  ER Tablet 15 milliGRAM(s) Oral every 12 hours  pantoprazole    Tablet 40 milliGRAM(s) Oral daily  polyethylene glycol 3350 17 Gram(s) Oral daily  senna 2 Tablet(s) Oral at bedtime PRN    Anticoagulation:  enoxaparin Injectable 40 milliGRAM(s) SubCutaneous every 24 hours      Antibiotics:       Pain medications:   acetaminophen   Tablet. 975 milliGRAM(s) Oral every 8 hours  baclofen 10 milliGRAM(s) Oral three times a day PRN  celecoxib 200 milliGRAM(s) Oral two times a day  HYDROmorphone  Injectable 0.5 milliGRAM(s) IV Push every 4 hours PRN  ondansetron Injectable 4 milliGRAM(s) IV Push every 6 hours PRN  oxyCODONE    IR 5 milliGRAM(s) Oral every 4 hours PRN  oxyCODONE    IR 10 milliGRAM(s) Oral every 4 hours PRN  oxyCODONE  ER Tablet 15 milliGRAM(s) Oral every 12 hours      RADIOLOGY & ADDITIONAL STUDIES:    A/P :   s/p B TKA, on 8/28  -    Pain control  -    DVT ppx: lovenox  -    Periop abx:  post op abx completed  -    ADAT  -    Check AM labs  -    Weight bearing status: WBAT  -    Resume home meds  -    Physical Therapy  -    Dispo: to be determined POST OPERATIVE DAY #:  [ ]   STATUS POST: [ ] Left [ ] Right  TKA/THR                        SUBJECTIVE: Patient seen and examined.  doing well. pain well controlled.  drains removed.  ready to work with PT.  denies CP/SOB.  no issues/concerns        OBJECTIVE:     Vital Signs Last 24 Hrs  T(C): 36.6 (29 Aug 2018 09:19), Max: 37.1 (28 Aug 2018 15:05)  T(F): 97.8 (29 Aug 2018 09:19), Max: 98.8 (28 Aug 2018 15:05)  HR: 76 (29 Aug 2018 09:19) (55 - 76)  BP: 124/67 (29 Aug 2018 09:19) (98/56 - 124/67)  BP(mean): 82 (28 Aug 2018 17:00) (73 - 88)  RR: 15 (29 Aug 2018 09:19) (15 - 24)  SpO2: 97% (29 Aug 2018 09:19) (96% - 99%)    Affected extremity:          Dressing: c/d/i         Sensation: SILT diffusely         Motor exam: 5/5 ehl/fhl/apf/adf         toes warm well perfused; capillary refill <3 seconds     LABS:                        12.3   10.2  )-----------( 185      ( 29 Aug 2018 06:19 )             38.6     08-29    142  |  105  |  15  ----------------------------<  133<H>  4.6   |  29  |  0.56    Ca    9.0      29 Aug 2018 06:19            MEDICATIONS:acetaminophen   Tablet. 975 milliGRAM(s) Oral every 8 hours  aluminum hydroxide/magnesium hydroxide/simethicone Suspension 30 milliLiter(s) Oral four times a day PRN  baclofen 10 milliGRAM(s) Oral three times a day PRN  BUpivacaine liposome 1.3% Injectable (no eMAR) 20 milliLiter(s) Local Injection once  celecoxib 200 milliGRAM(s) Oral two times a day  docusate sodium 100 milliGRAM(s) Oral three times a day  enoxaparin Injectable 40 milliGRAM(s) SubCutaneous every 24 hours  HYDROmorphone  Injectable 0.5 milliGRAM(s) IV Push every 4 hours PRN  lactated ringers. 1000 milliLiter(s) IV Continuous <Continuous>  losartan 50 milliGRAM(s) Oral daily  magnesium hydroxide Suspension 30 milliLiter(s) Oral daily PRN  ondansetron Injectable 4 milliGRAM(s) IV Push every 6 hours PRN  oxyCODONE    IR 5 milliGRAM(s) Oral every 4 hours PRN  oxyCODONE    IR 10 milliGRAM(s) Oral every 4 hours PRN  oxyCODONE  ER Tablet 15 milliGRAM(s) Oral every 12 hours  pantoprazole    Tablet 40 milliGRAM(s) Oral daily  polyethylene glycol 3350 17 Gram(s) Oral daily  senna 2 Tablet(s) Oral at bedtime PRN    Anticoagulation:  enoxaparin Injectable 40 milliGRAM(s) SubCutaneous every 24 hours      Antibiotics:       Pain medications:   acetaminophen   Tablet. 975 milliGRAM(s) Oral every 8 hours  baclofen 10 milliGRAM(s) Oral three times a day PRN  celecoxib 200 milliGRAM(s) Oral two times a day  HYDROmorphone  Injectable 0.5 milliGRAM(s) IV Push every 4 hours PRN  ondansetron Injectable 4 milliGRAM(s) IV Push every 6 hours PRN  oxyCODONE    IR 5 milliGRAM(s) Oral every 4 hours PRN  oxyCODONE    IR 10 milliGRAM(s) Oral every 4 hours PRN  oxyCODONE  ER Tablet 15 milliGRAM(s) Oral every 12 hours      RADIOLOGY & ADDITIONAL STUDIES:    A/P :   s/p B TKA, on 8/28  -    Pain control  -    DVT ppx: lovenox  -    Periop abx:  post op abx completed  -    ADAT  -    Check AM labs  -    Weight bearing status: WBAT  -    Resume home meds  -    Physical Therapy  -    Dispo: to be determined

## 2018-08-30 LAB
ANION GAP SERPL CALC-SCNC: 6 MMOL/L — SIGNIFICANT CHANGE UP (ref 5–17)
BUN SERPL-MCNC: 16 MG/DL — SIGNIFICANT CHANGE UP (ref 7–23)
CALCIUM SERPL-MCNC: 8.6 MG/DL — SIGNIFICANT CHANGE UP (ref 8.4–10.5)
CHLORIDE SERPL-SCNC: 105 MMOL/L — SIGNIFICANT CHANGE UP (ref 96–108)
CO2 SERPL-SCNC: 32 MMOL/L — HIGH (ref 22–31)
CREAT SERPL-MCNC: 0.6 MG/DL — SIGNIFICANT CHANGE UP (ref 0.5–1.3)
GLUCOSE SERPL-MCNC: 107 MG/DL — HIGH (ref 70–99)
HCT VFR BLD CALC: 35.7 % — SIGNIFICANT CHANGE UP (ref 34.5–45)
HGB BLD-MCNC: 11.2 G/DL — LOW (ref 11.5–15.5)
MCHC RBC-ENTMCNC: 31.4 G/DL — LOW (ref 32–36)
MCHC RBC-ENTMCNC: 31.5 PG — SIGNIFICANT CHANGE UP (ref 27–34)
MCV RBC AUTO: 100.6 FL — HIGH (ref 80–100)
PLATELET # BLD AUTO: 185 K/UL — SIGNIFICANT CHANGE UP (ref 150–400)
POTASSIUM SERPL-MCNC: 4.6 MMOL/L — SIGNIFICANT CHANGE UP (ref 3.5–5.3)
POTASSIUM SERPL-SCNC: 4.6 MMOL/L — SIGNIFICANT CHANGE UP (ref 3.5–5.3)
RBC # BLD: 3.55 M/UL — LOW (ref 3.8–5.2)
RBC # FLD: 13.2 % — SIGNIFICANT CHANGE UP (ref 10.3–16.9)
SODIUM SERPL-SCNC: 143 MMOL/L — SIGNIFICANT CHANGE UP (ref 135–145)
WBC # BLD: 9.5 K/UL — SIGNIFICANT CHANGE UP (ref 3.8–10.5)
WBC # FLD AUTO: 9.5 K/UL — SIGNIFICANT CHANGE UP (ref 3.8–10.5)

## 2018-08-30 RX ORDER — KETOROLAC TROMETHAMINE 30 MG/ML
15 SYRINGE (ML) INJECTION DAILY
Qty: 0 | Refills: 0 | Status: DISCONTINUED | OUTPATIENT
Start: 2018-08-30 | End: 2018-08-31

## 2018-08-30 RX ORDER — SENNA PLUS 8.6 MG/1
2 TABLET ORAL
Qty: 0 | Refills: 0 | COMMUNITY
Start: 2018-08-30

## 2018-08-30 RX ORDER — CELECOXIB 200 MG/1
1 CAPSULE ORAL
Qty: 0 | Refills: 0 | COMMUNITY
Start: 2018-08-30

## 2018-08-30 RX ORDER — ACETAMINOPHEN 500 MG
3 TABLET ORAL
Qty: 0 | Refills: 0 | COMMUNITY
Start: 2018-08-30

## 2018-08-30 RX ORDER — DOCUSATE SODIUM 100 MG
1 CAPSULE ORAL
Qty: 0 | Refills: 0 | COMMUNITY
Start: 2018-08-30

## 2018-08-30 RX ORDER — POLYETHYLENE GLYCOL 3350 17 G/17G
17 POWDER, FOR SOLUTION ORAL
Qty: 0 | Refills: 0 | COMMUNITY
Start: 2018-08-30

## 2018-08-30 RX ORDER — MILK THISTLE 150 MG
1 CAPSULE ORAL
Qty: 0 | Refills: 0 | COMMUNITY

## 2018-08-30 RX ORDER — OXYCODONE HYDROCHLORIDE 5 MG/1
1 TABLET ORAL
Qty: 0 | Refills: 0 | COMMUNITY
Start: 2018-08-30

## 2018-08-30 RX ORDER — PANTOPRAZOLE SODIUM 20 MG/1
1 TABLET, DELAYED RELEASE ORAL
Qty: 0 | Refills: 0 | COMMUNITY
Start: 2018-08-30

## 2018-08-30 RX ORDER — VITAMIN E 100 UNIT
0 CAPSULE ORAL
Qty: 0 | Refills: 0 | COMMUNITY

## 2018-08-30 RX ORDER — SODIUM CHLORIDE 9 MG/ML
1000 INJECTION INTRAMUSCULAR; INTRAVENOUS; SUBCUTANEOUS ONCE
Qty: 0 | Refills: 0 | Status: COMPLETED | OUTPATIENT
Start: 2018-08-30 | End: 2018-08-30

## 2018-08-30 RX ADMIN — Medication 975 MILLIGRAM(S): at 21:03

## 2018-08-30 RX ADMIN — Medication 975 MILLIGRAM(S): at 05:25

## 2018-08-30 RX ADMIN — OXYCODONE HYDROCHLORIDE 10 MILLIGRAM(S): 5 TABLET ORAL at 13:33

## 2018-08-30 RX ADMIN — Medication 100 MILLIGRAM(S): at 14:49

## 2018-08-30 RX ADMIN — POLYETHYLENE GLYCOL 3350 17 GRAM(S): 17 POWDER, FOR SOLUTION ORAL at 12:30

## 2018-08-30 RX ADMIN — OXYCODONE HYDROCHLORIDE 15 MILLIGRAM(S): 5 TABLET ORAL at 18:05

## 2018-08-30 RX ADMIN — SODIUM CHLORIDE 2000 MILLILITER(S): 9 INJECTION INTRAMUSCULAR; INTRAVENOUS; SUBCUTANEOUS at 08:54

## 2018-08-30 RX ADMIN — OXYCODONE HYDROCHLORIDE 15 MILLIGRAM(S): 5 TABLET ORAL at 19:05

## 2018-08-30 RX ADMIN — OXYCODONE HYDROCHLORIDE 10 MILLIGRAM(S): 5 TABLET ORAL at 04:33

## 2018-08-30 RX ADMIN — ATORVASTATIN CALCIUM 20 MILLIGRAM(S): 80 TABLET, FILM COATED ORAL at 21:03

## 2018-08-30 RX ADMIN — OXYCODONE HYDROCHLORIDE 10 MILLIGRAM(S): 5 TABLET ORAL at 03:25

## 2018-08-30 RX ADMIN — Medication 975 MILLIGRAM(S): at 06:24

## 2018-08-30 RX ADMIN — Medication 10 MILLIGRAM(S): at 05:58

## 2018-08-30 RX ADMIN — CELECOXIB 200 MILLIGRAM(S): 200 CAPSULE ORAL at 06:24

## 2018-08-30 RX ADMIN — Medication 100 MILLIGRAM(S): at 05:25

## 2018-08-30 RX ADMIN — ONDANSETRON 4 MILLIGRAM(S): 8 TABLET, FILM COATED ORAL at 08:27

## 2018-08-30 RX ADMIN — Medication 975 MILLIGRAM(S): at 15:47

## 2018-08-30 RX ADMIN — Medication 100 MILLIGRAM(S): at 21:03

## 2018-08-30 RX ADMIN — Medication 15 MILLIGRAM(S): at 15:04

## 2018-08-30 RX ADMIN — Medication 15 MILLIGRAM(S): at 14:49

## 2018-08-30 RX ADMIN — Medication 975 MILLIGRAM(S): at 22:00

## 2018-08-30 RX ADMIN — OXYCODONE HYDROCHLORIDE 10 MILLIGRAM(S): 5 TABLET ORAL at 12:33

## 2018-08-30 RX ADMIN — Medication 975 MILLIGRAM(S): at 14:47

## 2018-08-30 RX ADMIN — ENOXAPARIN SODIUM 40 MILLIGRAM(S): 100 INJECTION SUBCUTANEOUS at 18:06

## 2018-08-30 RX ADMIN — CELECOXIB 200 MILLIGRAM(S): 200 CAPSULE ORAL at 05:25

## 2018-08-30 RX ADMIN — OXYCODONE HYDROCHLORIDE 15 MILLIGRAM(S): 5 TABLET ORAL at 05:25

## 2018-08-30 RX ADMIN — CELECOXIB 200 MILLIGRAM(S): 200 CAPSULE ORAL at 18:05

## 2018-08-30 RX ADMIN — OXYCODONE HYDROCHLORIDE 15 MILLIGRAM(S): 5 TABLET ORAL at 06:24

## 2018-08-30 RX ADMIN — PANTOPRAZOLE SODIUM 40 MILLIGRAM(S): 20 TABLET, DELAYED RELEASE ORAL at 12:30

## 2018-08-30 RX ADMIN — CELECOXIB 200 MILLIGRAM(S): 200 CAPSULE ORAL at 19:05

## 2018-08-30 NOTE — DIETITIAN INITIAL EVALUATION ADULT. - ENERGY NEEDS
IBW used as pt is currently greater than 120% ideal body weight  Increased protein needs post-operatively

## 2018-08-30 NOTE — PROGRESS NOTE ADULT - SUBJECTIVE AND OBJECTIVE BOX
HPI:   57 year old female with Bilateral knees degenerative arthritis, genu varum deformity, decreased ROM and unsteady gait.  X rays confirm diagnosis. Patient is to have  bilateral total knee arthroplasty.  Patient reports she began having bilateral knee pain about 2 years ago. She reports she is unable to negotiate stairs and symptoms worse after prolonged immobility. She has severe pain when changing position in her sleep. She notes reduced range of motion in both knees because of pain.  Patient day #2 post op bilateral TKR with moderate bilateral knee pain kathy malaise.      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 975 milliGRAM(s) Oral every 8 hours  atorvastatin 20 milliGRAM(s) Oral at bedtime  BUpivacaine liposome 1.3% Injectable (no eMAR) 20 milliLiter(s) Local Injection once  celecoxib 200 milliGRAM(s) Oral two times a day  docusate sodium 100 milliGRAM(s) Oral three times a day  enoxaparin Injectable 40 milliGRAM(s) SubCutaneous every 24 hours  hydrochlorothiazide 12.5 milliGRAM(s) Oral daily  lactated ringers. 1000 milliLiter(s) (70 mL/Hr) IV Continuous <Continuous>  losartan 50 milliGRAM(s) Oral daily  oxyCODONE  ER Tablet 15 milliGRAM(s) Oral every 12 hours  pantoprazole    Tablet 40 milliGRAM(s) Oral daily  polyethylene glycol 3350 17 Gram(s) Oral daily    MEDICATIONS  (PRN):  aluminum hydroxide/magnesium hydroxide/simethicone Suspension 30 milliLiter(s) Oral four times a day PRN Indigestion  baclofen 10 milliGRAM(s) Oral three times a day PRN muscle spasms  bisacodyl Suppository 10 milliGRAM(s) Rectal daily PRN If no bowel movement by postoperative day #2  HYDROmorphone  Injectable 0.5 milliGRAM(s) IV Push every 4 hours PRN Severe Pain  magnesium hydroxide Suspension 30 milliLiter(s) Oral daily PRN Constipation  ondansetron Injectable 4 milliGRAM(s) IV Push every 6 hours PRN Nausea and/or Vomiting  oxyCODONE    IR 5 milliGRAM(s) Oral every 4 hours PRN Mild Pain  oxyCODONE    IR 10 milliGRAM(s) Oral every 4 hours PRN Moderate Pain  senna 2 Tablet(s) Oral at bedtime PRN Constipation      Allergies    No Known Allergies    REVIEW OF SYSTEMS    General:  malaise	  Skin/Breast: normal  Ophthalmologic: negative  ENMT:	normal  Respiratory and Thorax: normal  Cardiovascular:	normal  Gastrointestinal:	normal  Genitourinary:	normal  Musculoskeletal:	 bilateral knee swelling   Neurological:	normal  Psychiatric:	normal  Hematology/Lymphatics:	 negative  Endocrine:	negative  Allergic/Immunologic:	negative      PHYSICAL EXAM:     Vital Signs Last 24 Hrs  T(C): 36.4 (30 Aug 2018 05:05), Max: 36.6 (29 Aug 2018 09:19)  T(F): 97.5 (30 Aug 2018 05:05), Max: 97.8 (29 Aug 2018 09:19)  HR: 64 (30 Aug 2018 05:05) (64 - 76)  BP: 101/62 (30 Aug 2018 05:05) (95/55 - 157/82)  BP(mean): --  RR: 17 (30 Aug 2018 05:05) (15 - 17)  SpO2: 96% (30 Aug 2018 05:05) (96% - 99%)    Constitutional: WDWNF    Eyes: conj pale  ENMT: negative  Neck: supple  Breasts: not examined   Back: negative  Respiratory: clear to P&A  Cardiovascular: no MRGT or H  Gastrointestinal: normal bowel sounds  Genitourinary: neg  Rectal: not examined  Extremities: normal  Vascular: normal  Neurological: normal  Skin: negative  Lymph Nodes: negative  Musculoskeletal:   decreased ROM  bilateral knees  Psychiatric: anxiety                       12.3   10.2  )-----------( 185      ( 29 Aug 2018 06:19 )             38.6       08-29    142  |  105  |  15  ----------------------------<  133<H>  4.6   |  29  |  0.56    Ca    9.0      29 Aug 2018 06:19

## 2018-08-30 NOTE — PROGRESS NOTE ADULT - SUBJECTIVE AND OBJECTIVE BOX
ORTHO NOTE    [x ] Pt seen/examined at 8am oob in chair  [ ] Pt without any complaints/in NAD.    [x ] Pt complains of: nausea and dizziness      ROS: [ ] Fever  [ ] Chills  [ ] CP [ ] SOB [ ] Dysnea  [ ] Palpitations [ ] Cough [ ] N/V/C/D [ ] Paresthia [ ] Other     [ x] ROS  otherwise negative    .    PHYSICAL EXAM:    Vital Signs Last 24 Hrs  T(C): 36.4 (30 Aug 2018 09:39), Max: 36.5 (29 Aug 2018 20:28)  T(F): 97.6 (30 Aug 2018 09:39), Max: 97.7 (29 Aug 2018 20:28)  HR: 80 (30 Aug 2018 09:39) (64 - 80)  BP: 109/70 (30 Aug 2018 09:39) (95/55 - 115/66)  BP(mean): --  RR: 16 (30 Aug 2018 09:39) (15 - 17)  SpO2: 99% (30 Aug 2018 09:39) (96% - 99%)    I&O's Detail    29 Aug 2018 07:01  -  30 Aug 2018 07:00  --------------------------------------------------------  IN:    lactated ringers.: 420 mL    Oral Fluid: 1360 mL  Total IN: 1780 mL    OUT:    Indwelling Catheter - Urethral: 950 mL    Voided: 200 mL  Total OUT: 1150 mL    Total NET: 630 mL      30 Aug 2018 07:01  -  30 Aug 2018 14:40  --------------------------------------------------------  IN:    Oral Fluid: 440 mL    Sodium Chloride 0.9% IV Bolus: 1000 mL  Total IN: 1440 mL    OUT:    Voided: 800 mL  Total OUT: 800 mL    Total NET: 640 mL           CAPILLARY BLOOD GLUCOSE                      Neuro: AAOX3    Lungs: nonlabored, SpO2 wnl on RA, IS demonstrated    CV: hypotensive     ABD: soft, nontender    Ext: bilateral knee aquacel cdi with ice cryocuffs implemented  bilateral LE nvid  bilateral LE motor 5/5    LABS                        11.2   9.5   )-----------( 185      ( 30 Aug 2018 07:46 )             35.7                                08-30    143  |  105  |  16  ----------------------------<  107<H>  4.6   |  32<H>  |  0.60    Ca    8.6      30 Aug 2018 07:46        [ ] Other Labs  [ ] None ordered            Please check or [place] when present:  •  Heart Failure:    [ ] Acute        [ ]  Acute on Chronic        [ ] Chronic         [ ] Diastolic     [ ]  Combined    •  STEVE:     [ ] ATN        [ ]  Renal medullary necrosis       [ ]  Renal cortical necrosis                  [ ] Other pathological Lesion:  •  CKD:  [ ] Stage I   [ ] Stage II  [ ] Stage III    [ ]Stage IV   [ ]  CKD V   [ ]  Other/Unspecified:    •  Abdominal Nutritional Status:   [ ] Malnutrition-See Nutrition note    [ ] Cachexia   [ ]  Other        [ ] Supplement ordered:            [ ] Morbid Obesity: BMI >=40         ASSESSMENT/PLAN:      STATUS POST: b/l TKA pod2  orthostatic hypotension-- assisted back in bed, NS bolus 1L, h/h stable  pain control- encouraged non-narcotics  bowel regimen  independent with bed mobility and side stepping demonstrated  CONTINUE:          [ ] PT/OT- wbat    [ ] DVT PPX- scd, lovenox 40mg daily    [ ] Pain Mgt- acetaminophen 975mg q 8 hours, celebrex 200mg bid, oxycodone 5-10mg q 4 prn    [ ] Dispo plan- acute rehab

## 2018-08-30 NOTE — DIETITIAN INITIAL EVALUATION ADULT. - OTHER INFO
Pt is s/p b/l TKA POD2.  Pt is planned for discharge to acute rehab.  Pt with good appetite; consuming ~75% of meals.  Pt denies GI distress; pain is being managed.  Skin: surgical incision; otherwise intact.

## 2018-08-30 NOTE — PROGRESS NOTE ADULT - ASSESSMENT
A/P: 57y Female s/p bilateral TKA, POD #2  - Cont PT: WBAT/FROM without restrictions  - OOB with assistance, advance to ambulation as tolerated with assistive device  - Pain control  - DVT ppx  - Dispo: acute rehab

## 2018-08-30 NOTE — PROGRESS NOTE ADULT - SUBJECTIVE AND OBJECTIVE BOX
ORTHO PROGRESS NOTE  MICKI GREEN  57y Female  MRN-[id number]  POD #2 s/p B TKA    Patient examined at bedside. No acute events last 24hr. Pain well controlled. Progressing with PT. No complaints.    Vital Signs Last 24 Hrs  T(C): 36.4 (30 Aug 2018 05:05), Max: 36.6 (29 Aug 2018 09:19)  T(F): 97.5 (30 Aug 2018 05:05), Max: 97.8 (29 Aug 2018 09:19)  HR: 64 (30 Aug 2018 05:05) (64 - 76)  BP: 101/62 (30 Aug 2018 05:05) (95/55 - 157/82)  BP(mean): --  RR: 17 (30 Aug 2018 05:05) (15 - 17)  SpO2: 96% (30 Aug 2018 05:05) (96% - 99%)    AAOx3  NAD, pleasant, cooperative  Bilateral knees:  - Dressings changed  - Drain sites dry  - No erythema/fluctuance  - Calf soft/nontender  - TA/GSC/EHL/FHL 5/5  - NVI                          12.3   10.2  )-----------( 185      ( 29 Aug 2018 06:19 )             38.6

## 2018-08-30 NOTE — PROGRESS NOTE ADULT - PROBLEM SELECTOR PROBLEM 3
Umbilical hernia without obstruction and without gangrene
Umbilical hernia without obstruction and without gangrene

## 2018-08-31 ENCOUNTER — INPATIENT (INPATIENT)
Facility: HOSPITAL | Age: 58
LOS: 7 days | Discharge: HOME | DRG: 950 | End: 2018-09-08
Attending: STUDENT IN AN ORGANIZED HEALTH CARE EDUCATION/TRAINING PROGRAM | Admitting: PHYSICAL MEDICINE & REHABILITATION
Payer: COMMERCIAL

## 2018-08-31 VITALS
SYSTOLIC BLOOD PRESSURE: 113 MMHG | RESPIRATION RATE: 17 BRPM | OXYGEN SATURATION: 98 % | DIASTOLIC BLOOD PRESSURE: 75 MMHG | HEART RATE: 90 BPM | TEMPERATURE: 99 F

## 2018-08-31 VITALS
RESPIRATION RATE: 14 BRPM | TEMPERATURE: 98 F | SYSTOLIC BLOOD PRESSURE: 116 MMHG | HEART RATE: 83 BPM | DIASTOLIC BLOOD PRESSURE: 76 MMHG | WEIGHT: 135.8 LBS | HEIGHT: 59 IN | OXYGEN SATURATION: 99 %

## 2018-08-31 DIAGNOSIS — Z98.890 OTHER SPECIFIED POSTPROCEDURAL STATES: Chronic | ICD-10-CM

## 2018-08-31 DIAGNOSIS — Z41.9 ENCOUNTER FOR PROCEDURE FOR PURPOSES OTHER THAN REMEDYING HEALTH STATE, UNSPECIFIED: Chronic | ICD-10-CM

## 2018-08-31 DIAGNOSIS — Z96.653 PRESENCE OF ARTIFICIAL KNEE JOINT, BILATERAL: ICD-10-CM

## 2018-08-31 LAB
ANION GAP SERPL CALC-SCNC: 6 MMOL/L — SIGNIFICANT CHANGE UP (ref 5–17)
BUN SERPL-MCNC: 14 MG/DL — SIGNIFICANT CHANGE UP (ref 7–23)
CALCIUM SERPL-MCNC: 9 MG/DL — SIGNIFICANT CHANGE UP (ref 8.4–10.5)
CHLORIDE SERPL-SCNC: 106 MMOL/L — SIGNIFICANT CHANGE UP (ref 96–108)
CO2 SERPL-SCNC: 32 MMOL/L — HIGH (ref 22–31)
CREAT SERPL-MCNC: 0.62 MG/DL — SIGNIFICANT CHANGE UP (ref 0.5–1.3)
GLUCOSE SERPL-MCNC: 115 MG/DL — HIGH (ref 70–99)
HCT VFR BLD CALC: 38 % — SIGNIFICANT CHANGE UP (ref 34.5–45)
HGB BLD-MCNC: 12.1 G/DL — SIGNIFICANT CHANGE UP (ref 11.5–15.5)
MCHC RBC-ENTMCNC: 31.8 G/DL — LOW (ref 32–36)
MCHC RBC-ENTMCNC: 32.2 PG — SIGNIFICANT CHANGE UP (ref 27–34)
MCV RBC AUTO: 101.1 FL — HIGH (ref 80–100)
PLATELET # BLD AUTO: 193 K/UL — SIGNIFICANT CHANGE UP (ref 150–400)
POTASSIUM SERPL-MCNC: 4.8 MMOL/L — SIGNIFICANT CHANGE UP (ref 3.5–5.3)
POTASSIUM SERPL-SCNC: 4.8 MMOL/L — SIGNIFICANT CHANGE UP (ref 3.5–5.3)
RBC # BLD: 3.76 M/UL — LOW (ref 3.8–5.2)
RBC # FLD: 12.9 % — SIGNIFICANT CHANGE UP (ref 10.3–16.9)
SODIUM SERPL-SCNC: 144 MMOL/L — SIGNIFICANT CHANGE UP (ref 135–145)
WBC # BLD: 7.9 K/UL — SIGNIFICANT CHANGE UP (ref 3.8–10.5)
WBC # FLD AUTO: 7.9 K/UL — SIGNIFICANT CHANGE UP (ref 3.8–10.5)

## 2018-08-31 PROCEDURE — 73560 X-RAY EXAM OF KNEE 1 OR 2: CPT

## 2018-08-31 PROCEDURE — 86850 RBC ANTIBODY SCREEN: CPT

## 2018-08-31 PROCEDURE — 85027 COMPLETE CBC AUTOMATED: CPT

## 2018-08-31 PROCEDURE — 86900 BLOOD TYPING SEROLOGIC ABO: CPT

## 2018-08-31 PROCEDURE — 80048 BASIC METABOLIC PNL TOTAL CA: CPT

## 2018-08-31 PROCEDURE — 36415 COLL VENOUS BLD VENIPUNCTURE: CPT

## 2018-08-31 PROCEDURE — C1776: CPT

## 2018-08-31 PROCEDURE — C1713: CPT

## 2018-08-31 PROCEDURE — 88300 SURGICAL PATH GROSS: CPT

## 2018-08-31 PROCEDURE — 99222 1ST HOSP IP/OBS MODERATE 55: CPT

## 2018-08-31 PROCEDURE — 86901 BLOOD TYPING SEROLOGIC RH(D): CPT

## 2018-08-31 PROCEDURE — 97116 GAIT TRAINING THERAPY: CPT

## 2018-08-31 PROCEDURE — 97161 PT EVAL LOW COMPLEX 20 MIN: CPT

## 2018-08-31 RX ORDER — HYDROCHLOROTHIAZIDE 25 MG
12.5 TABLET ORAL DAILY
Qty: 0 | Refills: 0 | Status: DISCONTINUED | OUTPATIENT
Start: 2018-08-31 | End: 2018-09-04

## 2018-08-31 RX ORDER — ONDANSETRON 8 MG/1
4 TABLET, FILM COATED ORAL EVERY 8 HOURS
Qty: 0 | Refills: 0 | Status: DISCONTINUED | OUTPATIENT
Start: 2018-08-31 | End: 2018-09-08

## 2018-08-31 RX ORDER — PANTOPRAZOLE SODIUM 20 MG/1
40 TABLET, DELAYED RELEASE ORAL
Qty: 0 | Refills: 0 | Status: DISCONTINUED | OUTPATIENT
Start: 2018-08-31 | End: 2018-09-08

## 2018-08-31 RX ORDER — ATORVASTATIN CALCIUM 80 MG/1
20 TABLET, FILM COATED ORAL AT BEDTIME
Qty: 0 | Refills: 0 | Status: DISCONTINUED | OUTPATIENT
Start: 2018-08-31 | End: 2018-09-08

## 2018-08-31 RX ORDER — OXYCODONE HYDROCHLORIDE 5 MG/1
10 TABLET ORAL EVERY 4 HOURS
Qty: 0 | Refills: 0 | Status: DISCONTINUED | OUTPATIENT
Start: 2018-08-31 | End: 2018-09-07

## 2018-08-31 RX ORDER — DOCUSATE SODIUM 100 MG
100 CAPSULE ORAL THREE TIMES A DAY
Qty: 0 | Refills: 0 | Status: DISCONTINUED | OUTPATIENT
Start: 2018-08-31 | End: 2018-09-08

## 2018-08-31 RX ORDER — OXYCODONE HYDROCHLORIDE 5 MG/1
5 TABLET ORAL EVERY 4 HOURS
Qty: 0 | Refills: 0 | Status: DISCONTINUED | OUTPATIENT
Start: 2018-08-31 | End: 2018-08-31

## 2018-08-31 RX ORDER — CHOLECALCIFEROL (VITAMIN D3) 125 MCG
1000 CAPSULE ORAL DAILY
Qty: 0 | Refills: 0 | Status: DISCONTINUED | OUTPATIENT
Start: 2018-08-31 | End: 2018-09-08

## 2018-08-31 RX ORDER — ENOXAPARIN SODIUM 100 MG/ML
40 INJECTION SUBCUTANEOUS EVERY 24 HOURS
Qty: 0 | Refills: 0 | Status: DISCONTINUED | OUTPATIENT
Start: 2018-08-31 | End: 2018-09-08

## 2018-08-31 RX ORDER — OXYCODONE HYDROCHLORIDE 5 MG/1
10 TABLET ORAL EVERY 4 HOURS
Qty: 0 | Refills: 0 | Status: DISCONTINUED | OUTPATIENT
Start: 2018-08-31 | End: 2018-08-31

## 2018-08-31 RX ORDER — CELECOXIB 200 MG/1
200 CAPSULE ORAL
Qty: 0 | Refills: 0 | Status: COMPLETED | OUTPATIENT
Start: 2018-08-31 | End: 2018-09-04

## 2018-08-31 RX ORDER — POLYETHYLENE GLYCOL 3350 17 G/17G
17 POWDER, FOR SOLUTION ORAL DAILY
Qty: 0 | Refills: 0 | Status: DISCONTINUED | OUTPATIENT
Start: 2018-08-31 | End: 2018-09-08

## 2018-08-31 RX ORDER — ACETAMINOPHEN 500 MG
975 TABLET ORAL EVERY 8 HOURS
Qty: 0 | Refills: 0 | Status: DISCONTINUED | OUTPATIENT
Start: 2018-08-31 | End: 2018-08-31

## 2018-08-31 RX ORDER — ASPIRIN/CALCIUM CARB/MAGNESIUM 324 MG
325 TABLET ORAL EVERY 12 HOURS
Qty: 0 | Refills: 0 | Status: CANCELLED | OUTPATIENT
Start: 2018-09-12 | End: 2018-09-08

## 2018-08-31 RX ORDER — LOSARTAN POTASSIUM 100 MG/1
50 TABLET, FILM COATED ORAL DAILY
Qty: 0 | Refills: 0 | Status: DISCONTINUED | OUTPATIENT
Start: 2018-08-31 | End: 2018-09-02

## 2018-08-31 RX ORDER — ACETAMINOPHEN 500 MG
650 TABLET ORAL EVERY 6 HOURS
Qty: 0 | Refills: 0 | Status: DISCONTINUED | OUTPATIENT
Start: 2018-08-31 | End: 2018-09-08

## 2018-08-31 RX ORDER — OXYCODONE HYDROCHLORIDE 5 MG/1
15 TABLET ORAL EVERY 4 HOURS
Qty: 0 | Refills: 0 | Status: DISCONTINUED | OUTPATIENT
Start: 2018-08-31 | End: 2018-09-07

## 2018-08-31 RX ORDER — SENNA PLUS 8.6 MG/1
2 TABLET ORAL AT BEDTIME
Qty: 0 | Refills: 0 | Status: DISCONTINUED | OUTPATIENT
Start: 2018-08-31 | End: 2018-09-08

## 2018-08-31 RX ADMIN — SENNA PLUS 2 TABLET(S): 8.6 TABLET ORAL at 21:52

## 2018-08-31 RX ADMIN — Medication 975 MILLIGRAM(S): at 05:22

## 2018-08-31 RX ADMIN — OXYCODONE HYDROCHLORIDE 15 MILLIGRAM(S): 5 TABLET ORAL at 00:09

## 2018-08-31 RX ADMIN — Medication 100 MILLIGRAM(S): at 21:53

## 2018-08-31 RX ADMIN — OXYCODONE HYDROCHLORIDE 10 MILLIGRAM(S): 5 TABLET ORAL at 12:08

## 2018-08-31 RX ADMIN — CELECOXIB 200 MILLIGRAM(S): 200 CAPSULE ORAL at 05:23

## 2018-08-31 RX ADMIN — Medication 975 MILLIGRAM(S): at 00:09

## 2018-08-31 RX ADMIN — Medication 1000 UNIT(S): at 21:53

## 2018-08-31 RX ADMIN — PANTOPRAZOLE SODIUM 40 MILLIGRAM(S): 20 TABLET, DELAYED RELEASE ORAL at 12:08

## 2018-08-31 RX ADMIN — OXYCODONE HYDROCHLORIDE 15 MILLIGRAM(S): 5 TABLET ORAL at 05:23

## 2018-08-31 RX ADMIN — CELECOXIB 200 MILLIGRAM(S): 200 CAPSULE ORAL at 00:09

## 2018-08-31 RX ADMIN — LOSARTAN POTASSIUM 50 MILLIGRAM(S): 100 TABLET, FILM COATED ORAL at 05:23

## 2018-08-31 RX ADMIN — Medication 1 TABLET(S): at 21:52

## 2018-08-31 RX ADMIN — CELECOXIB 200 MILLIGRAM(S): 200 CAPSULE ORAL at 18:12

## 2018-08-31 RX ADMIN — Medication 12.5 MILLIGRAM(S): at 05:23

## 2018-08-31 RX ADMIN — OXYCODONE HYDROCHLORIDE 10 MILLIGRAM(S): 5 TABLET ORAL at 18:07

## 2018-08-31 RX ADMIN — ENOXAPARIN SODIUM 40 MILLIGRAM(S): 100 INJECTION SUBCUTANEOUS at 18:12

## 2018-08-31 RX ADMIN — Medication 100 MILLIGRAM(S): at 05:22

## 2018-08-31 RX ADMIN — ATORVASTATIN CALCIUM 20 MILLIGRAM(S): 80 TABLET, FILM COATED ORAL at 21:52

## 2018-08-31 NOTE — H&P ADULT - NSHPSOCIALHISTORY_GEN_ALL_CORE
SOCIAL HISTORY  Smoking - Denied  EtOH - Social, red wine   Drugs - Denied  Works as an RN at [employer]    FUNCTIONAL HISTORY  Lives with 4 friends in a  in [place], 2 flights with one HR to bedroom   Prior Level of Function: Independent prior with Ambulation and ADLs.     CURRENT FUNCTIONAL STATUS  - Bed Mobility: supervision  - Transfers: CG  - Gait: 200' RW CG  - ADLs: LE dressing Max A, toileting Min A SOCIAL HISTORY  Smoking - Denied  EtOH - Social, red wine   Drugs - Denied  Works as an RN at [employer]    FUNCTIONAL HISTORY  Lives with 4 friends in a  in [place], 2 flights with one HR to bedroom   Prior Level of Function: Independent prior with Ambulation and ADLs.     CURRENT FUNCTIONAL STATUS  - Bed Mobility: supervision  - Transfers: CG  - Gait: 200' RW CG with little knee flexion.   - ADLs: LE dressing Max A, toileting Min A

## 2018-08-31 NOTE — PATIENT PROFILE ADULT. - NSALCOHOLFREQ_GEN_A_CORE_SD
87 Y/O F EXTENSIVE PMHX S/P FALL. PT ON PLAVIX. + HEAD TRAUMA. XRAYS WITH WRIST FX. CT SCANS WITH NO ACUTE TRAUMATIC INJURY. PT SPLINTED AND DISCHARGED TO FOLLOW UP WITH ORTHOPEDICS. monthly or less

## 2018-08-31 NOTE — PATIENT PROFILE ADULT. - PSH
History of hernia repair  2010  Other postprocedural status  S/P appendectomy  Status post total hysterectomy  S/P DENNIS-BSO (total abdominal hysterectomy and bilateral salpingo-oophorectomy)  Surgery, elective  left shoulder arthroscopy- 2016  BILAT SHOULDER

## 2018-08-31 NOTE — H&P ADULT - NSHPPHYSICALEXAM_GEN_ALL_CORE
VITAL SIGNS:  T(C): 36.6 (31 Aug 2018 16:33), Max: 37.2 (31 Aug 2018 09:03)  T(F): 97.8 (31 Aug 2018 16:33), Max: 98.9 (31 Aug 2018 09:03)  HR: 83 (31 Aug 2018 16:33) (72 - 90)  BP: 116/76 (31 Aug 2018 16:33) (113/75 - 148/76)  RR: 14 (31 Aug 2018 16:33) (14 - 17)  SpO2: 99% (31 Aug 2018 16:33) (98% - 100%)    PHYSICAL EXAM  Constitutional - NAD, Comfortable  HEENT - NCAT, EOMI  Neck - Supple  Chest - CTA bilaterally  Cardiovascular - RRR, S1S2  Abdomen - BS+, Soft, NTND  Extremities - No C/C/E, No calf tenderness  Neurologic Exam -  Cognitive - Awake, Alert, AAO to self, place, date, year, situation  Cranial Nerves - CN 2-12 grossly intact  Motor -  LEFT UE - ShAB 5/5, EF 5/5, EE 5/5, WE 5/5,  4+/5, noted ulnar deviation of bilateral wrists   RIGHT UE - ShAB 5/5, EF 5/5, EE 5/5, WE 5/5,  4+/5, noted ulnar deviation of bilateral wrists   LEFT LE - HF at least 3/5, KE-almost 100 degrees actively, DF 5/5, PF 5/5, halux valgus  RIGHT LE - HF at least 3/5, KE-almost 100 degrees actively, DF 5/5, PF 5/5, halux valgus  Sensory - Intact to light touch  Reflexes - DTR intact and symmetrical  Psychiatric - Affect WNL

## 2018-08-31 NOTE — PATIENT PROFILE ADULT. - PRO EXPECT LENGTH STAY
Patient mother called for patient, has questions he went to a free clinic and was told he needed labs drawn and to be tested for lupus immediately...patient is wondering if you think he needs labs drawn? And if so can you order? Patent is also requesting you call him after 5pm due to being in class, he is very concerned.  [phone]    
unsure

## 2018-08-31 NOTE — H&P ADULT - NSHPREVIEWOFSYSTEMS_GEN_ALL_CORE
REVIEW OF SYSTEMS  Constitutional: No fever, No Chills, No fatigue  HEENT: No eye pain, No visual disturbances, No difficulty hearing, No Dysphagia   Pulm: No cough,  No shortness of breath  Cardio: No chest pain, No palpitations  GI:  No abdominal pain, No nausea, No vomiting, No diarrhea, No constipation, No incontinence, Last Bowel Movement this AM  : No dysuria, No frequency, No hematuria, No incontinence  Neuro: No headaches, No memory loss, No loss of strength, reports numbness and distal palmar aspect of right fingertips  Skin: No itching, No rashes, No lesions   Endo: No temperature intolerance  MSK: Pain controlled at rest, worsens with ambulation   Psych:  No depression, No anxiety    Any Major Surgery within past 100 days? Yes / Yes     Two or more Falls within past one year?  No / Yes                   One Fall with injury past one year?           No / Yes REVIEW OF SYSTEMS  Constitutional: No fever, No Chills, No fatigue  HEENT: No eye pain, No visual disturbances, No difficulty hearing, No Dysphagia   Pulm: No cough,  No shortness of breath  Cardio: No chest pain, No palpitations  GI:  No abdominal pain, No nausea, No vomiting, No diarrhea, No constipation, No incontinence, Last Bowel Movement this AM  : No dysuria, No frequency, No hematuria, No incontinence  Neuro: No headaches, No memory loss, No loss of strength, reports numbness and distal palmar aspect of right fingertips  Skin: No itching, No rashes, No lesions   Endo: No temperature intolerance  MSK: Pain controlled at rest, worsens with ambulation. Had evalution for possible rheumatoid arthritis that was negative.   Psych:  No depression, No anxiety    Any Major Surgery within past 100 days? Yes / Yes     Two or more Falls within past one year?  No / Yes                   One Fall with injury past one year?           No / Yes

## 2018-08-31 NOTE — H&P ADULT - HISTORY OF PRESENT ILLNESS
Ms. Bagley is a 57 year old F with PMH HTN, HLD, bilateral knee OA, asthma (worsens when she goes to the [facility]), bilateral rotator cuff repair (most recently, right shoulder 1/12/18). She has had bilateral knee pain for approximately 2 years. She is s/p bilateral total knee replacement on 8/28/18 at [facility] by Dr. Gardner. No post-operative complications. Optimized for discharge to acute rehab.     Weight bearing: WBAT bilateral LEs  DVT prophylaxis lovenox 40mg subcutaneous daily for 14 days postop, then stop and start Aspirin 325mg PO BID for 4 weeks

## 2018-08-31 NOTE — PROGRESS NOTE ADULT - SUBJECTIVE AND OBJECTIVE BOX
POST OPERATIVE DAY #:  3   STATUS POST: bilateral TKA                       SUBJECTIVE: Patient seen and examined. No complaints overnight. Pt states she has been ambulating and doing her exercises. No SOB or CP    OBJECTIVE:   Vital Signs Last 24 Hrs  T(C): 36.4 (31 Aug 2018 04:57), Max: 36.6 (30 Aug 2018 16:18)  T(F): 97.6 (31 Aug 2018 04:57), Max: 97.8 (30 Aug 2018 16:18)  HR: 81 (31 Aug 2018 04:57) (65 - 86)  BP: 129/84 (31 Aug 2018 04:57) (109/70 - 148/76)  BP(mean): --  RR: 16 (31 Aug 2018 04:57) (15 - 17)  SpO2: 100% (31 Aug 2018 04:57) (96% - 100%)    Affected extremity:          Dressing:  clean/dry/intact          Sensation:  intact to light touch           Motor exam: 5/ 5 Tibialis Anterior/Gastrocnemius-Soleus          warm well perfused; capillary refill <3 seconds     LABS:                        12.1   7.9   )-----------( 193      ( 31 Aug 2018 06:03 )             38.0     08-31    144  |  106  |  14  ----------------------------<  115<H>  4.8   |  32<H>  |  0.62    Ca    9.0      31 Aug 2018 06:03            MEDICATIONS:acetaminophen   Tablet. 975 milliGRAM(s) Oral every 8 hours  aluminum hydroxide/magnesium hydroxide/simethicone Suspension 30 milliLiter(s) Oral four times a day PRN  atorvastatin 20 milliGRAM(s) Oral at bedtime  baclofen 10 milliGRAM(s) Oral three times a day PRN  bisacodyl Suppository 10 milliGRAM(s) Rectal daily PRN  BUpivacaine liposome 1.3% Injectable (no eMAR) 20 milliLiter(s) Local Injection once  celecoxib 200 milliGRAM(s) Oral two times a day  docusate sodium 100 milliGRAM(s) Oral three times a day  enoxaparin Injectable 40 milliGRAM(s) SubCutaneous every 24 hours  hydrochlorothiazide 12.5 milliGRAM(s) Oral daily  HYDROmorphone  Injectable 0.5 milliGRAM(s) IV Push every 4 hours PRN  ketorolac   Injectable 15 milliGRAM(s) IV Push daily  lactated ringers. 1000 milliLiter(s) IV Continuous <Continuous>  losartan 50 milliGRAM(s) Oral daily  magnesium hydroxide Suspension 30 milliLiter(s) Oral daily PRN  ondansetron Injectable 4 milliGRAM(s) IV Push every 6 hours PRN  oxyCODONE    IR 5 milliGRAM(s) Oral every 4 hours PRN  oxyCODONE    IR 10 milliGRAM(s) Oral every 4 hours PRN  oxyCODONE  ER Tablet 15 milliGRAM(s) Oral every 12 hours  pantoprazole    Tablet 40 milliGRAM(s) Oral daily  polyethylene glycol 3350 17 Gram(s) Oral daily  senna 2 Tablet(s) Oral at bedtime PRN    Anticoagulation:  enoxaparin Injectable 40 milliGRAM(s) SubCutaneous every 24 hours    Pain medications:   acetaminophen   Tablet. 975 milliGRAM(s) Oral every 8 hours  baclofen 10 milliGRAM(s) Oral three times a day PRN  celecoxib 200 milliGRAM(s) Oral two times a day  HYDROmorphone  Injectable 0.5 milliGRAM(s) IV Push every 4 hours PRN  ketorolac   Injectable 15 milliGRAM(s) IV Push daily  ondansetron Injectable 4 milliGRAM(s) IV Push every 6 hours PRN  oxyCODONE    IR 5 milliGRAM(s) Oral every 4 hours PRN  oxyCODONE    IR 10 milliGRAM(s) Oral every 4 hours PRN  oxyCODONE  ER Tablet 15 milliGRAM(s) Oral every 12 hours    A/P :   s/p bilateral TKA, POD 3  -    Pain control  -    DVT ppx  -    Check AM labs  -    Weight bearing status: WBAT  -    Resume home meds  -    Physical Therapy  -    Dispo:   [facility]

## 2018-08-31 NOTE — H&P ADULT - ASSESSMENT
Ms. Bagley is 57 year old F s/p bilateral TKA on 8/28 presenting with functional, gait, ADL impairments     #S/p bilateral TKA   -Rehab: Begin comprehensive PT/OT for 3 hours per day, goals include mod I with stair negotiation, mod I with mobility, transfers and ADLs  -WBAT bilateral LEs  -DVT ppx: Lovenox 40mg subcutaneous daily for 14 days postop, then stop and start Aspirin 325mg PO BID for 4 weeks  -Pain control: Oxycodone prn, tylenol prn, celebrex 200 mg PO BID x 4 days     #Cardiac  -HTN: HCTZ, losartan  -Mitral valve disorder, stable     #HLD  -atorvastatin    #Resp  -History of asthma, stable     #  -Voiding spontaneously, check PVRs x 24 hours     #GI  -Bowel regimen: senna, colace, miralax, bisacodyl suppository prn   -PPx: Protonix  -Nutrition: Vit D, MVI    Precautions: Fall                                                                              Diet: DASH/TLC  Medical Prognosis: Good    Prescreen Comparison: I have reviewed the prescreen information and I found no relevant changes between the preadmission  screening and my post admission evaluation.     Expected Therapy:   P.T.     1.5   hrs/day   1.5        O. T.      hrs/day           S.L.P.        hrs/day                    P&O                                                   Excpected Frequency: 5 days/7 day period    Rehab Potential:      Good                            Estimated Disposition:          Home with HC            ELOS:  7-10            days      Rationale For Inpatient Rehab Admission- Patient demonstrates the following:     [X] Medically appropriate for rehabilitation admission   [X] Has attainable rehab goals with an appropriate discharge plan  [X] Has rehabilitation potential (expected to make significant improvement within a reasonable period of time)  [X] Requires close medical management by a rehab physician, rehab nursing care and comprehensive interdisciplinary team (including         PT, OT, SLP and/or prosthetics and orthotics)

## 2018-08-31 NOTE — H&P ADULT - PMH
Acid reflux    Asthma    Breast lump  Breast mass in female  Malignant neoplasm of ovary  Ovarian cancer  Mitral valve disorder  tricuspid valve and pulmonic  valve  Umbilical hernia  Umbilical hernia

## 2018-08-31 NOTE — H&P ADULT - NSHPLABSRESULTS_GEN_ALL_CORE
LABS:                       12.1   7.9   )-----------( 193      ( 31 Aug 2018 06:03 )             38.0     31 Aug 2018 06:03    144    |  106    |  14     ----------------------------<  115    4.8     |  32     |  0.62     Ca    9.0        31 Aug 2018 06:03

## 2018-09-01 DIAGNOSIS — Z29.9 ENCOUNTER FOR PROPHYLACTIC MEASURES, UNSPECIFIED: ICD-10-CM

## 2018-09-01 DIAGNOSIS — Z96.653 PRESENCE OF ARTIFICIAL KNEE JOINT, BILATERAL: ICD-10-CM

## 2018-09-01 DIAGNOSIS — R26.81 UNSTEADINESS ON FEET: ICD-10-CM

## 2018-09-01 DIAGNOSIS — J45.20 MILD INTERMITTENT ASTHMA, UNCOMPLICATED: ICD-10-CM

## 2018-09-01 DIAGNOSIS — K21.9 GASTRO-ESOPHAGEAL REFLUX DISEASE WITHOUT ESOPHAGITIS: ICD-10-CM

## 2018-09-01 LAB
ALBUMIN SERPL ELPH-MCNC: 2.4 G/DL — LOW (ref 3.3–5)
ALP SERPL-CCNC: 51 U/L — SIGNIFICANT CHANGE UP (ref 40–120)
ALT FLD-CCNC: 18 U/L DA — SIGNIFICANT CHANGE UP (ref 10–45)
ANION GAP SERPL CALC-SCNC: 2 MMOL/L — LOW (ref 5–17)
AST SERPL-CCNC: 22 U/L — SIGNIFICANT CHANGE UP (ref 10–40)
BASOPHILS # BLD AUTO: 0.1 K/UL — SIGNIFICANT CHANGE UP (ref 0–0.2)
BASOPHILS NFR BLD AUTO: 1.6 % — SIGNIFICANT CHANGE UP (ref 0–2)
BILIRUB SERPL-MCNC: 0.9 MG/DL — SIGNIFICANT CHANGE UP (ref 0.2–1.2)
BUN SERPL-MCNC: 13 MG/DL — SIGNIFICANT CHANGE UP (ref 7–23)
CALCIUM SERPL-MCNC: 8.8 MG/DL — SIGNIFICANT CHANGE UP (ref 8.4–10.5)
CHLORIDE SERPL-SCNC: 101 MMOL/L — SIGNIFICANT CHANGE UP (ref 96–108)
CO2 SERPL-SCNC: 34 MMOL/L — HIGH (ref 22–31)
CREAT SERPL-MCNC: 0.64 MG/DL — SIGNIFICANT CHANGE UP (ref 0.5–1.3)
EOSINOPHIL # BLD AUTO: 0.2 K/UL — SIGNIFICANT CHANGE UP (ref 0–0.5)
EOSINOPHIL NFR BLD AUTO: 2.7 % — SIGNIFICANT CHANGE UP (ref 0–6)
GLUCOSE SERPL-MCNC: 125 MG/DL — HIGH (ref 70–99)
HCT VFR BLD CALC: 36 % — SIGNIFICANT CHANGE UP (ref 34.5–45)
HGB BLD-MCNC: 12.1 G/DL — SIGNIFICANT CHANGE UP (ref 11.5–15.5)
LYMPHOCYTES # BLD AUTO: 2 K/UL — SIGNIFICANT CHANGE UP (ref 1–3.3)
LYMPHOCYTES # BLD AUTO: 23.9 % — SIGNIFICANT CHANGE UP (ref 13–44)
MCHC RBC-ENTMCNC: 32.4 PG — SIGNIFICANT CHANGE UP (ref 27–34)
MCHC RBC-ENTMCNC: 33.5 GM/DL — SIGNIFICANT CHANGE UP (ref 32–36)
MCV RBC AUTO: 96.9 FL — SIGNIFICANT CHANGE UP (ref 80–100)
MONOCYTES # BLD AUTO: 0.7 K/UL — SIGNIFICANT CHANGE UP (ref 0–0.9)
MONOCYTES NFR BLD AUTO: 8.6 % — SIGNIFICANT CHANGE UP (ref 1–9)
NEUTROPHILS # BLD AUTO: 5.3 K/UL — SIGNIFICANT CHANGE UP (ref 1.8–7.4)
NEUTROPHILS NFR BLD AUTO: 63.3 % — SIGNIFICANT CHANGE UP (ref 43–77)
PLATELET # BLD AUTO: 208 K/UL — SIGNIFICANT CHANGE UP (ref 150–400)
POTASSIUM SERPL-MCNC: 4.2 MMOL/L — SIGNIFICANT CHANGE UP (ref 3.5–5.3)
POTASSIUM SERPL-SCNC: 4.2 MMOL/L — SIGNIFICANT CHANGE UP (ref 3.5–5.3)
PREALB SERPL-MCNC: 10 MG/DL — LOW (ref 20–40)
PROT SERPL-MCNC: 6.2 G/DL — SIGNIFICANT CHANGE UP (ref 6–8.3)
RBC # BLD: 3.72 M/UL — LOW (ref 3.8–5.2)
RBC # FLD: 11.9 % — SIGNIFICANT CHANGE UP (ref 10.3–14.5)
SODIUM SERPL-SCNC: 137 MMOL/L — SIGNIFICANT CHANGE UP (ref 135–145)
WBC # BLD: 8.3 K/UL — SIGNIFICANT CHANGE UP (ref 3.8–10.5)
WBC # FLD AUTO: 8.3 K/UL — SIGNIFICANT CHANGE UP (ref 3.8–10.5)

## 2018-09-01 PROCEDURE — 93010 ELECTROCARDIOGRAM REPORT: CPT

## 2018-09-01 PROCEDURE — 99232 SBSQ HOSP IP/OBS MODERATE 35: CPT

## 2018-09-01 PROCEDURE — 99255 IP/OBS CONSLTJ NEW/EST HI 80: CPT

## 2018-09-01 RX ORDER — INFLUENZA VIRUS VACCINE 15; 15; 15; 15 UG/.5ML; UG/.5ML; UG/.5ML; UG/.5ML
0.5 SUSPENSION INTRAMUSCULAR ONCE
Qty: 0 | Refills: 0 | Status: COMPLETED | OUTPATIENT
Start: 2018-09-01 | End: 2018-09-01

## 2018-09-01 RX ADMIN — Medication 100 MILLIGRAM(S): at 14:04

## 2018-09-01 RX ADMIN — CELECOXIB 200 MILLIGRAM(S): 200 CAPSULE ORAL at 18:29

## 2018-09-01 RX ADMIN — OXYCODONE HYDROCHLORIDE 10 MILLIGRAM(S): 5 TABLET ORAL at 12:26

## 2018-09-01 RX ADMIN — PANTOPRAZOLE SODIUM 40 MILLIGRAM(S): 20 TABLET, DELAYED RELEASE ORAL at 06:04

## 2018-09-01 RX ADMIN — Medication 650 MILLIGRAM(S): at 12:33

## 2018-09-01 RX ADMIN — CELECOXIB 200 MILLIGRAM(S): 200 CAPSULE ORAL at 06:48

## 2018-09-01 RX ADMIN — Medication 100 MILLIGRAM(S): at 21:10

## 2018-09-01 RX ADMIN — OXYCODONE HYDROCHLORIDE 10 MILLIGRAM(S): 5 TABLET ORAL at 06:49

## 2018-09-01 RX ADMIN — CELECOXIB 200 MILLIGRAM(S): 200 CAPSULE ORAL at 05:14

## 2018-09-01 RX ADMIN — OXYCODONE HYDROCHLORIDE 10 MILLIGRAM(S): 5 TABLET ORAL at 10:35

## 2018-09-01 RX ADMIN — CELECOXIB 200 MILLIGRAM(S): 200 CAPSULE ORAL at 07:11

## 2018-09-01 RX ADMIN — OXYCODONE HYDROCHLORIDE 10 MILLIGRAM(S): 5 TABLET ORAL at 06:50

## 2018-09-01 RX ADMIN — CELECOXIB 200 MILLIGRAM(S): 200 CAPSULE ORAL at 18:20

## 2018-09-01 RX ADMIN — OXYCODONE HYDROCHLORIDE 10 MILLIGRAM(S): 5 TABLET ORAL at 18:21

## 2018-09-01 RX ADMIN — Medication 1000 UNIT(S): at 11:52

## 2018-09-01 RX ADMIN — Medication 100 MILLIGRAM(S): at 05:14

## 2018-09-01 RX ADMIN — OXYCODONE HYDROCHLORIDE 10 MILLIGRAM(S): 5 TABLET ORAL at 05:14

## 2018-09-01 RX ADMIN — Medication 650 MILLIGRAM(S): at 13:15

## 2018-09-01 RX ADMIN — Medication 1 TABLET(S): at 11:52

## 2018-09-01 RX ADMIN — POLYETHYLENE GLYCOL 3350 17 GRAM(S): 17 POWDER, FOR SOLUTION ORAL at 11:52

## 2018-09-01 RX ADMIN — Medication 12.5 MILLIGRAM(S): at 05:14

## 2018-09-01 RX ADMIN — ATORVASTATIN CALCIUM 20 MILLIGRAM(S): 80 TABLET, FILM COATED ORAL at 21:10

## 2018-09-01 RX ADMIN — OXYCODONE HYDROCHLORIDE 10 MILLIGRAM(S): 5 TABLET ORAL at 18:24

## 2018-09-01 RX ADMIN — ENOXAPARIN SODIUM 40 MILLIGRAM(S): 100 INJECTION SUBCUTANEOUS at 18:22

## 2018-09-01 RX ADMIN — LOSARTAN POTASSIUM 50 MILLIGRAM(S): 100 TABLET, FILM COATED ORAL at 05:14

## 2018-09-01 NOTE — DIETITIAN INITIAL EVALUATION ADULT. - ENERGY NEEDS
Height: 4'11", Weight: (8/31) 135.8lbs  Skin: surgical incision bilateral knees, Edema: none per flow sheets  Last BM: 8/31  IBW range: 88-108lbs

## 2018-09-01 NOTE — DIETITIAN INITIAL EVALUATION ADULT. - NS AS NUTRI INTERV ED CONTENT
Reviewed heart healthy diet guidelines. Pt may have regular eggs at breakfast./Nutrition relationship to health/disease

## 2018-09-01 NOTE — DIETITIAN INITIAL EVALUATION ADULT. - NS AS NUTRI INTERV MEALS SNACK
General/healthful diet/Continue DASH/TLC as indicated by hx HTN, HLD. Encouraged po intake >75% at meals for strength and healing during rehab course.

## 2018-09-01 NOTE — CONSULT NOTE ADULT - SUBJECTIVE AND OBJECTIVE BOX
CC: Patient is a 57y old  Female who presents with a chief complaint of Mobility limitations after Bilateral total knee replacement. (31 Aug 2018 16:32)    HPI:  Pt is a 57 year old F with PMH HTN, HLD, bilateral knee OA, asthma admitted to rehab after bilateral total knee replacement on 8/28/18 at [facility] by Dr. Gardner. No post-operative complications. Optimized for discharge to acute rehab.     DVT prophylaxis lovenox 40mg subcutaneous daily for 14 days postop, then stop and start Aspirin 325mg PO BID for 4 weeks (31 Aug 2018 16:32)    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  Acid reflux  Asthma  Malignant neoplasm of ovary: Ovarian cancer  Umbilical hernia: Umbilical hernia  Breast lump: Breast mass in female  History of hernia repair: 2010  Surgery, elective: left shoulder arthroscopy- 2016  BILAT SHOULDER  Other postprocedural status: S/P appendectomy  Status post total hysterectomy: S/P DENNIS-BSO (total abdominal hysterectomy and bilateral salpingo-oophorectomy)    SOCIAL HISTORY:  Tobacco Usage:  (  x ) never smoked   (   ) former smoker   (   ) current smoker  (     ) pack years    Tobacco Quit Date:  Substance Use (Street drugs): ( x ) never used  (  ) other:  Alcohol Usage: None    Family history reviewed and otherwise non-contributory  ALLERGIES:  No Known Allergies    MEDICATIONS:  acetaminophen   Tablet. 650 milliGRAM(s) Oral every 6 hours PRN  aluminum hydroxide/magnesium hydroxide/simethicone Suspension 30 milliLiter(s) Oral every 6 hours PRN  atorvastatin 20 milliGRAM(s) Oral at bedtime  bisacodyl Suppository 10 milliGRAM(s) Rectal daily PRN  celecoxib 200 milliGRAM(s) Oral two times a day  cholecalciferol 1000 Unit(s) Oral daily  docusate sodium 100 milliGRAM(s) Oral three times a day  enoxaparin Injectable 40 milliGRAM(s) SubCutaneous every 24 hours  hydrochlorothiazide 12.5 milliGRAM(s) Oral daily  influenza   Vaccine 0.5 milliLiter(s) IntraMuscular once  losartan 50 milliGRAM(s) Oral daily  multivitamin 1 Tablet(s) Oral daily  ondansetron    Tablet 4 milliGRAM(s) Oral every 8 hours PRN  oxyCODONE    IR 5 milliGRAM(s) Oral every 4 hours PRN  oxyCODONE    IR 10 milliGRAM(s) Oral every 4 hours PRN  oxyCODONE    IR 15 milliGRAM(s) Oral every 4 hours PRN  pantoprazole    Tablet 40 milliGRAM(s) Oral before breakfast  polyethylene glycol 3350 17 Gram(s) Oral daily  senna 2 Tablet(s) Oral at bedtime    REVIEW OF SYSTEMS:  CONSTITUTIONAL: No fever, weight loss, or fatigue  EYES: No eye pain, visual disturbances, or discharge  ENMT:  No difficulty hearing, tinnitus, vertigo; No sinus or throat pain  NECK: No neck pain or neck stiffness  RESPIRATORY: No cough, wheezing, chills or hemoptysis; No shortness of breath  CARDIOVASCULAR: No chest pain, palpitations, dizziness, or leg swelling  GASTROINTESTINAL: No abdominal pain, No nausea, vomiting, or hematemesis; No diarrhea or constipation  GENITOURINARY: No dysuria, frequency, hematuria, or incontinence  NEUROLOGICAL: No headaches, memory loss, loss of strength, numbness, or tremors  SKIN: No itching, burning, rashes, or lesions   ENDOCRINE: No heat or cold intolerance; No hair loss  MUSCULOSKELETAL: No joint pain or swelling; No muscle, back, or extremity pain  PSYCHIATRIC: No depression, anxiety, mood swings, or difficulty sleeping  HEME/LYMPH: No easy bruising or bleeding  ALLERY AND IMMUNOLOGIC: No hives or eczema    All other ROS reviewed and negative except as otherwise stated    Vital Signs Last 24 Hrs  T(F): 98 (31 Aug 2018 22:01), Max: 98 (31 Aug 2018 22:01)  HR: 77 (01 Sep 2018 05:27) (77 - 95)  BP: 109/72 (01 Sep 2018 05:27) (109/72 - 116/76)  RR: 14 (01 Sep 2018 05:27) (14 - 14)  SpO2: 98% (01 Sep 2018 05:27) (96% - 99%)  I&O's Summary    PHYSICAL EXAM:  GENERAL: NAD, well-groomed, well-developed  HEAD:  Atraumatic, Normocephalic  EYES: EOMI, PERRLA, conjunctiva and sclera clear  ENMT: No tonsillar erythema, exudates, or enlargement; Moist mucous membranes, Good dentition  NECK: Supple, No JVD  CHEST/LUNG: Clear to auscultation bilaterally; No rales, rhonchi, wheezing, or rubs  HEART: Regular rate and rhythm; S1/S2, No murmurs, rubs, or gallops  ABDOMEN: Soft, Nontender, Nondistended; Bowel sounds present  VASCULAR: Normal pulses, Normal capillary refill  EXTREMITIES:  2+ Peripheral Pulses, No cyanosis, No edema  LYMPH: No lymphadenopathy noted  SKIN: Warm, Intact  PSYCH: Normal mood and affect  NERVOUS SYSTEM:  A/O x3, CN 2-12 intact, No focal deficits    LABS:                        12.1   8.3   )-----------( 208      ( 01 Sep 2018 06:35 )             36.0     09-01    137  |  101  |  13  ----------------------------<  125  4.2   |  34  |  0.64    Ca    8.8      01 Sep 2018 06:35    TPro  6.2  /  Alb  2.4  /  TBili  0.9  /  DBili  x   /  AST  22  /  ALT  18  /  AlkPhos  51  09-01    eGFR if Non African American: 98 mL/min/1.73M2 (09-01-18 @ 06:35)  eGFR if : 113 mL/min/1.73M2 (09-01-18 @ 06:35)                  CAPILLARY BLOOD GLUCOSE                RADIOLOGY & ADDITIONAL TESTS:    Care Discussed with Consultants/Other Providers:

## 2018-09-01 NOTE — CHART NOTE - NSCHARTNOTEFT_GEN_A_CORE
Patient noted to have TWI in inferior and lateral leads on morning EKG. Pt denies new sx or concerns. Denies hx of previously abnormal EKGs or stress test. Discussed results with hospitalist, Dr. Blankenship, who recommended getting a repeat EKG in the morning.   -Check EKG in am

## 2018-09-01 NOTE — DIETITIAN INITIAL EVALUATION ADULT. - ADHERENCE
n/a/Pt states that she likes to eat a lot of vegetables and salads + a diet generally low in carbohydrates.

## 2018-09-01 NOTE — DIETITIAN INITIAL EVALUATION ADULT. - ORAL INTAKE PTA
Pt reports good appetite PTA. States that she tries to avoid carbs. Denies difficulty chewing/swallowing. Confirms NKFA./good

## 2018-09-01 NOTE — PROGRESS NOTE ADULT - SUBJECTIVE AND OBJECTIVE BOX
INTERVAL SUBJECTIVE & REVIEW OF SYMPTOMS:  No new issues overnight. Slept well. Denies any increased pain        REVIEW OF SYSTEMS  [  x ] Constitutional WNL  [  x ] Cardio WNL  [  x ] Resp WNL  [  x ] GI WNL  [  x ]  WNL  [ x  ] Heme WNL    VITAL SIGNS  Vital Signs Last 24 Hrs  T(C): 36.7 (31 Aug 2018 22:01), Max: 36.7 (31 Aug 2018 22:01)  T(F): 98 (31 Aug 2018 22:01), Max: 98 (31 Aug 2018 22:01)  HR: 77 (01 Sep 2018 05:27) (77 - 95)  BP: 109/72 (01 Sep 2018 05:27) (109/72 - 116/76)  BP(mean): --  RR: 14 (01 Sep 2018 05:27) (14 - 14)  SpO2: 98% (01 Sep 2018 05:27) (96% - 99%)    PHYSICAL EXAM  General: NAD  Cardio: S1S2+  Resp: clear  Abdomen: soft, NT   Extrem: RLE some edema noted  Skin: Bilateral knee incisions: no drainage. no redness  Functional Exam: Eval today     RECENT LABS:                        12.1   8.3   )-----------( 208      ( 01 Sep 2018 06:35 )             36.0     09-01    137  |  101  |  13  ----------------------------<  125<H>  4.2   |  34<H>  |  0.64    Ca    8.8      01 Sep 2018 06:35    TPro  6.2  /  Alb  2.4<L>  /  TBili  0.9  /  DBili  x   /  AST  22  /  ALT  18  /  AlkPhos  51  09-01      MEDICATIONS  (STANDING):  atorvastatin 20 milliGRAM(s) Oral at bedtime  celecoxib 200 milliGRAM(s) Oral two times a day  cholecalciferol 1000 Unit(s) Oral daily  docusate sodium 100 milliGRAM(s) Oral three times a day  enoxaparin Injectable 40 milliGRAM(s) SubCutaneous every 24 hours  hydrochlorothiazide 12.5 milliGRAM(s) Oral daily  influenza   Vaccine 0.5 milliLiter(s) IntraMuscular once  losartan 50 milliGRAM(s) Oral daily  multivitamin 1 Tablet(s) Oral daily  pantoprazole    Tablet 40 milliGRAM(s) Oral before breakfast  polyethylene glycol 3350 17 Gram(s) Oral daily  senna 2 Tablet(s) Oral at bedtime    MEDICATIONS  (PRN):  acetaminophen   Tablet. 650 milliGRAM(s) Oral every 6 hours PRN Mild Pain (1 - 3)  aluminum hydroxide/magnesium hydroxide/simethicone Suspension 30 milliLiter(s) Oral every 6 hours PRN Dyspepsia  bisacodyl Suppository 10 milliGRAM(s) Rectal daily PRN Constipation  ondansetron    Tablet 4 milliGRAM(s) Oral every 8 hours PRN Nausea and/or Vomiting  oxyCODONE    IR 5 milliGRAM(s) Oral every 4 hours PRN Mild Pain (1 - 3)  oxyCODONE    IR 10 milliGRAM(s) Oral every 4 hours PRN Moderate Pain (4 - 6)  oxyCODONE    IR 15 milliGRAM(s) Oral every 4 hours PRN Severe Pain (7 - 10)      RADIOLOGY/OTHER RESULTS:      A/P:57 year old female with bilateral knee OA, now s/p TKA    S/p bilateral TKA   -Rehab: Begin comprehensive PT/OT for 3 hours per day,    DVT ppx: Lovenox 40mg subcutaneous daily for 14 days postop, then stop and start Aspirin 325mg PO BID for 4 weeks    Pain control: Oxycodone prn, tylenol prn, celebrex 200 mg PO BID    HTN: on HCTZ, Losartan    Hyperlipidemia: on atorvastatin    Bowel: on colace, senna, miralax    Labs: today stable

## 2018-09-01 NOTE — DIETITIAN INITIAL EVALUATION ADULT. - OTHER INFO
Pt is 57 year old F s/p bilateral TKA on 8/28 presenting with functional, gait, ADL impairments. Verbal consult received from RN to see pt regrading food preferences. PT tolerating current diet with report of fair appetite. Reports about 50% po intake at lunch today. Reviewed menu booklet/preferences with pt. Encouraged pt to modify menu to her liking in order to reach goal of meeting >75% estimated nutritional needs. Pt denies GI distress. Name/contact information provided.

## 2018-09-02 PROCEDURE — 93010 ELECTROCARDIOGRAM REPORT: CPT

## 2018-09-02 PROCEDURE — 99232 SBSQ HOSP IP/OBS MODERATE 35: CPT

## 2018-09-02 RX ORDER — LOSARTAN POTASSIUM 100 MG/1
25 TABLET, FILM COATED ORAL DAILY
Qty: 0 | Refills: 0 | Status: DISCONTINUED | OUTPATIENT
Start: 2018-09-02 | End: 2018-09-08

## 2018-09-02 RX ADMIN — POLYETHYLENE GLYCOL 3350 17 GRAM(S): 17 POWDER, FOR SOLUTION ORAL at 11:53

## 2018-09-02 RX ADMIN — ATORVASTATIN CALCIUM 20 MILLIGRAM(S): 80 TABLET, FILM COATED ORAL at 22:36

## 2018-09-02 RX ADMIN — CELECOXIB 200 MILLIGRAM(S): 200 CAPSULE ORAL at 06:03

## 2018-09-02 RX ADMIN — OXYCODONE HYDROCHLORIDE 10 MILLIGRAM(S): 5 TABLET ORAL at 12:55

## 2018-09-02 RX ADMIN — Medication 650 MILLIGRAM(S): at 06:03

## 2018-09-02 RX ADMIN — OXYCODONE HYDROCHLORIDE 10 MILLIGRAM(S): 5 TABLET ORAL at 13:53

## 2018-09-02 RX ADMIN — CELECOXIB 200 MILLIGRAM(S): 200 CAPSULE ORAL at 18:25

## 2018-09-02 RX ADMIN — Medication 100 MILLIGRAM(S): at 22:36

## 2018-09-02 RX ADMIN — Medication 1 TABLET(S): at 11:53

## 2018-09-02 RX ADMIN — Medication 1000 UNIT(S): at 11:53

## 2018-09-02 RX ADMIN — CELECOXIB 200 MILLIGRAM(S): 200 CAPSULE ORAL at 18:20

## 2018-09-02 RX ADMIN — CELECOXIB 200 MILLIGRAM(S): 200 CAPSULE ORAL at 06:38

## 2018-09-02 RX ADMIN — Medication 12.5 MILLIGRAM(S): at 06:02

## 2018-09-02 RX ADMIN — LOSARTAN POTASSIUM 50 MILLIGRAM(S): 100 TABLET, FILM COATED ORAL at 06:02

## 2018-09-02 RX ADMIN — OXYCODONE HYDROCHLORIDE 10 MILLIGRAM(S): 5 TABLET ORAL at 06:03

## 2018-09-02 RX ADMIN — Medication 100 MILLIGRAM(S): at 16:07

## 2018-09-02 RX ADMIN — Medication 650 MILLIGRAM(S): at 06:38

## 2018-09-02 RX ADMIN — ENOXAPARIN SODIUM 40 MILLIGRAM(S): 100 INJECTION SUBCUTANEOUS at 18:20

## 2018-09-02 RX ADMIN — Medication 100 MILLIGRAM(S): at 06:02

## 2018-09-02 RX ADMIN — OXYCODONE HYDROCHLORIDE 10 MILLIGRAM(S): 5 TABLET ORAL at 19:00

## 2018-09-02 RX ADMIN — PANTOPRAZOLE SODIUM 40 MILLIGRAM(S): 20 TABLET, DELAYED RELEASE ORAL at 06:41

## 2018-09-02 RX ADMIN — OXYCODONE HYDROCHLORIDE 10 MILLIGRAM(S): 5 TABLET ORAL at 06:38

## 2018-09-02 RX ADMIN — OXYCODONE HYDROCHLORIDE 10 MILLIGRAM(S): 5 TABLET ORAL at 18:22

## 2018-09-02 NOTE — PROGRESS NOTE ADULT - SUBJECTIVE AND OBJECTIVE BOX
INTERVAL SUBJECTIVE & REVIEW OF SYMPTOMS:  No new issues overnight. Slept well. Denies any increased pain        REVIEW OF SYSTEMS  [  x ] Constitutional WNL  [  x ] Cardio WNL  [  x ] Resp WNL  [  x ] GI WNL  [  x ]  WNL  [ x  ] Heme WNL    VITAL SIGNS  Vital Signs Last 24 Hrs  T(C): 36.7 (31 Aug 2018 22:01), Max: 36.7 (31 Aug 2018 22:01)  T(F): 98 (31 Aug 2018 22:01), Max: 98 (31 Aug 2018 22:01)  HR: 77 (01 Sep 2018 05:27) (77 - 95)  BP: 109/72 (01 Sep 2018 05:27) (109/72 - 116/76)  BP(mean): --  RR: 14 (01 Sep 2018 05:27) (14 - 14)  SpO2: 98% (01 Sep 2018 05:27) (96% - 99%)    PHYSICAL EXAM  General: NAD  Cardio: S1S2+  Resp: clear  Abdomen: soft, NT   Extrem: RLE some edema noted  Skin: Bilateral knee incisions: no drainage. no redness. (+)bilateral aquacel dressing  Functional Exam: Eval today     RECENT LABS:                        12.1   8.3   )-----------( 208      ( 01 Sep 2018 06:35 )             36.0     09-01    137  |  101  |  13  ----------------------------<  125<H>  4.2   |  34<H>  |  0.64    Ca    8.8      01 Sep 2018 06:35    TPro  6.2  /  Alb  2.4<L>  /  TBili  0.9  /  DBili  x   /  AST  22  /  ALT  18  /  AlkPhos  51  09-01      MEDICATIONS  (STANDING):  atorvastatin 20 milliGRAM(s) Oral at bedtime  celecoxib 200 milliGRAM(s) Oral two times a day  cholecalciferol 1000 Unit(s) Oral daily  docusate sodium 100 milliGRAM(s) Oral three times a day  enoxaparin Injectable 40 milliGRAM(s) SubCutaneous every 24 hours  hydrochlorothiazide 12.5 milliGRAM(s) Oral daily  influenza   Vaccine 0.5 milliLiter(s) IntraMuscular once  losartan 50 milliGRAM(s) Oral daily  multivitamin 1 Tablet(s) Oral daily  pantoprazole    Tablet 40 milliGRAM(s) Oral before breakfast  polyethylene glycol 3350 17 Gram(s) Oral daily  senna 2 Tablet(s) Oral at bedtime    MEDICATIONS  (PRN):  acetaminophen   Tablet. 650 milliGRAM(s) Oral every 6 hours PRN Mild Pain (1 - 3)  aluminum hydroxide/magnesium hydroxide/simethicone Suspension 30 milliLiter(s) Oral every 6 hours PRN Dyspepsia  bisacodyl Suppository 10 milliGRAM(s) Rectal daily PRN Constipation  ondansetron    Tablet 4 milliGRAM(s) Oral every 8 hours PRN Nausea and/or Vomiting  oxyCODONE    IR 5 milliGRAM(s) Oral every 4 hours PRN Mild Pain (1 - 3)  oxyCODONE    IR 10 milliGRAM(s) Oral every 4 hours PRN Moderate Pain (4 - 6)  oxyCODONE    IR 15 milliGRAM(s) Oral every 4 hours PRN Severe Pain (7 - 10)      RADIOLOGY/OTHER RESULTS:      A/P:57 year old female with bilateral knee OA, now s/p bilateral TKA    S/p bilateral TKA   -Rehab: Begin comprehensive PT/OT for 3 hours per day,    DVT ppx: Lovenox 40mg subcutaneous daily for 14 days postop, then stop and start Aspirin 325mg PO BID for 4 weeks    Pain control: Oxycodone prn, tylenol prn, celebrex 200 mg PO BID    HTN: on HCTZ, Losartan. BP 89/55 prior to breakfast and after meds. 100/67 after breakfast.    Hyperlipidemia: on atorvastatin    Bowel: on colace, senna, miralax INTERVAL SUBJECTIVE & REVIEW OF SYMPTOMS:  No new issues overnight. Slept well. Denies any increased pain        REVIEW OF SYSTEMS  [  x ] Constitutional WNL  [  x ] Cardio WNL  [  x ] Resp WNL  [  x ] GI WNL  [  x ]  WNL  [ x  ] Heme WNL    VITAL SIGNS  Vital Signs Last 24 Hrs  T(C): 36.7 (31 Aug 2018 22:01), Max: 36.7 (31 Aug 2018 22:01)  T(F): 98 (31 Aug 2018 22:01), Max: 98 (31 Aug 2018 22:01)  HR: 77 (01 Sep 2018 05:27) (77 - 95)  BP: 109/72 (01 Sep 2018 05:27) (109/72 - 116/76)  BP(mean): --  RR: 14 (01 Sep 2018 05:27) (14 - 14)  SpO2: 98% (01 Sep 2018 05:27) (96% - 99%)    PHYSICAL EXAM  General: NAD  Cardio: S1S2+  Resp: clear  Abdomen: soft, NT   Extrem: RLE some edema noted  Skin: Bilateral knee incisions: no drainage. no redness. (+)bilateral aquacel dressing  Functional Exam: Eval today     RECENT LABS:                        12.1   8.3   )-----------( 208      ( 01 Sep 2018 06:35 )             36.0     09-01    137  |  101  |  13  ----------------------------<  125<H>  4.2   |  34<H>  |  0.64    Ca    8.8      01 Sep 2018 06:35    TPro  6.2  /  Alb  2.4<L>  /  TBili  0.9  /  DBili  x   /  AST  22  /  ALT  18  /  AlkPhos  51  09-01      MEDICATIONS  (STANDING):  atorvastatin 20 milliGRAM(s) Oral at bedtime  celecoxib 200 milliGRAM(s) Oral two times a day  cholecalciferol 1000 Unit(s) Oral daily  docusate sodium 100 milliGRAM(s) Oral three times a day  enoxaparin Injectable 40 milliGRAM(s) SubCutaneous every 24 hours  hydrochlorothiazide 12.5 milliGRAM(s) Oral daily  influenza   Vaccine 0.5 milliLiter(s) IntraMuscular once  losartan 50 milliGRAM(s) Oral daily  multivitamin 1 Tablet(s) Oral daily  pantoprazole    Tablet 40 milliGRAM(s) Oral before breakfast  polyethylene glycol 3350 17 Gram(s) Oral daily  senna 2 Tablet(s) Oral at bedtime    MEDICATIONS  (PRN):  acetaminophen   Tablet. 650 milliGRAM(s) Oral every 6 hours PRN Mild Pain (1 - 3)  aluminum hydroxide/magnesium hydroxide/simethicone Suspension 30 milliLiter(s) Oral every 6 hours PRN Dyspepsia  bisacodyl Suppository 10 milliGRAM(s) Rectal daily PRN Constipation  ondansetron    Tablet 4 milliGRAM(s) Oral every 8 hours PRN Nausea and/or Vomiting  oxyCODONE    IR 5 milliGRAM(s) Oral every 4 hours PRN Mild Pain (1 - 3)  oxyCODONE    IR 10 milliGRAM(s) Oral every 4 hours PRN Moderate Pain (4 - 6)  oxyCODONE    IR 15 milliGRAM(s) Oral every 4 hours PRN Severe Pain (7 - 10)      RADIOLOGY/OTHER RESULTS:      A/P:57 year old female with bilateral knee OA, now s/p bilateral TKA    S/p bilateral TKA   -Rehab: Begin comprehensive PT/OT for 3 hours per day,    DVT ppx: Lovenox 40mg subcutaneous daily for 14 days postop, then stop and start Aspirin 325mg PO BID for 4 weeks    Pain control: Oxycodone prn, tylenol prn, celebrex 200 mg PO BID    HTN: on HCTZ, Losartan. BP 89/55 prior to breakfast and after meds. 100/67 after breakfast. Will reduce losartan to 25mg PO daily    Hyperlipidemia: on atorvastatin    Bowel: on colace, senna, miralax

## 2018-09-03 PROCEDURE — 99232 SBSQ HOSP IP/OBS MODERATE 35: CPT | Mod: GC

## 2018-09-03 RX ADMIN — Medication 100 MILLIGRAM(S): at 06:43

## 2018-09-03 RX ADMIN — ATORVASTATIN CALCIUM 20 MILLIGRAM(S): 80 TABLET, FILM COATED ORAL at 22:43

## 2018-09-03 RX ADMIN — OXYCODONE HYDROCHLORIDE 10 MILLIGRAM(S): 5 TABLET ORAL at 06:43

## 2018-09-03 RX ADMIN — CELECOXIB 200 MILLIGRAM(S): 200 CAPSULE ORAL at 17:35

## 2018-09-03 RX ADMIN — Medication 1000 UNIT(S): at 13:37

## 2018-09-03 RX ADMIN — Medication 1 TABLET(S): at 13:37

## 2018-09-03 RX ADMIN — OXYCODONE HYDROCHLORIDE 15 MILLIGRAM(S): 5 TABLET ORAL at 19:02

## 2018-09-03 RX ADMIN — CELECOXIB 200 MILLIGRAM(S): 200 CAPSULE ORAL at 06:44

## 2018-09-03 RX ADMIN — OXYCODONE HYDROCHLORIDE 15 MILLIGRAM(S): 5 TABLET ORAL at 13:36

## 2018-09-03 RX ADMIN — OXYCODONE HYDROCHLORIDE 10 MILLIGRAM(S): 5 TABLET ORAL at 19:02

## 2018-09-03 RX ADMIN — OXYCODONE HYDROCHLORIDE 10 MILLIGRAM(S): 5 TABLET ORAL at 22:49

## 2018-09-03 RX ADMIN — Medication 650 MILLIGRAM(S): at 08:54

## 2018-09-03 RX ADMIN — Medication 100 MILLIGRAM(S): at 22:43

## 2018-09-03 RX ADMIN — ENOXAPARIN SODIUM 40 MILLIGRAM(S): 100 INJECTION SUBCUTANEOUS at 17:31

## 2018-09-03 RX ADMIN — CELECOXIB 200 MILLIGRAM(S): 200 CAPSULE ORAL at 07:57

## 2018-09-03 RX ADMIN — LOSARTAN POTASSIUM 25 MILLIGRAM(S): 100 TABLET, FILM COATED ORAL at 06:43

## 2018-09-03 RX ADMIN — Medication 100 MILLIGRAM(S): at 13:36

## 2018-09-03 RX ADMIN — PANTOPRAZOLE SODIUM 40 MILLIGRAM(S): 20 TABLET, DELAYED RELEASE ORAL at 11:33

## 2018-09-03 RX ADMIN — Medication 650 MILLIGRAM(S): at 08:52

## 2018-09-03 RX ADMIN — OXYCODONE HYDROCHLORIDE 10 MILLIGRAM(S): 5 TABLET ORAL at 08:34

## 2018-09-03 RX ADMIN — Medication 12.5 MILLIGRAM(S): at 06:45

## 2018-09-03 RX ADMIN — OXYCODONE HYDROCHLORIDE 10 MILLIGRAM(S): 5 TABLET ORAL at 23:25

## 2018-09-03 RX ADMIN — CELECOXIB 200 MILLIGRAM(S): 200 CAPSULE ORAL at 17:32

## 2018-09-04 ENCOUNTER — INBOUND DOCUMENT (OUTPATIENT)
Age: 58
End: 2018-09-04

## 2018-09-04 PROCEDURE — 99232 SBSQ HOSP IP/OBS MODERATE 35: CPT

## 2018-09-04 PROCEDURE — 99232 SBSQ HOSP IP/OBS MODERATE 35: CPT | Mod: GC

## 2018-09-04 RX ADMIN — OXYCODONE HYDROCHLORIDE 15 MILLIGRAM(S): 5 TABLET ORAL at 09:33

## 2018-09-04 RX ADMIN — OXYCODONE HYDROCHLORIDE 10 MILLIGRAM(S): 5 TABLET ORAL at 04:28

## 2018-09-04 RX ADMIN — OXYCODONE HYDROCHLORIDE 15 MILLIGRAM(S): 5 TABLET ORAL at 12:42

## 2018-09-04 RX ADMIN — OXYCODONE HYDROCHLORIDE 10 MILLIGRAM(S): 5 TABLET ORAL at 21:09

## 2018-09-04 RX ADMIN — Medication 100 MILLIGRAM(S): at 21:08

## 2018-09-04 RX ADMIN — CELECOXIB 200 MILLIGRAM(S): 200 CAPSULE ORAL at 06:45

## 2018-09-04 RX ADMIN — CELECOXIB 200 MILLIGRAM(S): 200 CAPSULE ORAL at 06:15

## 2018-09-04 RX ADMIN — ENOXAPARIN SODIUM 40 MILLIGRAM(S): 100 INJECTION SUBCUTANEOUS at 17:18

## 2018-09-04 RX ADMIN — ATORVASTATIN CALCIUM 20 MILLIGRAM(S): 80 TABLET, FILM COATED ORAL at 21:08

## 2018-09-04 RX ADMIN — Medication 12.5 MILLIGRAM(S): at 06:17

## 2018-09-04 RX ADMIN — Medication 1 TABLET(S): at 12:41

## 2018-09-04 RX ADMIN — PANTOPRAZOLE SODIUM 40 MILLIGRAM(S): 20 TABLET, DELAYED RELEASE ORAL at 06:18

## 2018-09-04 RX ADMIN — LOSARTAN POTASSIUM 25 MILLIGRAM(S): 100 TABLET, FILM COATED ORAL at 06:18

## 2018-09-04 RX ADMIN — OXYCODONE HYDROCHLORIDE 10 MILLIGRAM(S): 5 TABLET ORAL at 04:45

## 2018-09-04 RX ADMIN — OXYCODONE HYDROCHLORIDE 10 MILLIGRAM(S): 5 TABLET ORAL at 22:05

## 2018-09-04 RX ADMIN — Medication 1000 UNIT(S): at 12:42

## 2018-09-04 RX ADMIN — Medication 100 MILLIGRAM(S): at 06:15

## 2018-09-04 RX ADMIN — OXYCODONE HYDROCHLORIDE 15 MILLIGRAM(S): 5 TABLET ORAL at 13:00

## 2018-09-04 RX ADMIN — OXYCODONE HYDROCHLORIDE 15 MILLIGRAM(S): 5 TABLET ORAL at 08:41

## 2018-09-04 RX ADMIN — Medication 100 MILLIGRAM(S): at 14:23

## 2018-09-04 RX ADMIN — OXYCODONE HYDROCHLORIDE 10 MILLIGRAM(S): 5 TABLET ORAL at 17:56

## 2018-09-04 NOTE — PROGRESS NOTE ADULT - SUBJECTIVE AND OBJECTIVE BOX
HPI:  Ms. Bagley is a 57 year old F with PMH HTN, HLD, bilateral knee OA, asthma (worsens when she goes to the [facility]), bilateral rotator cuff repair (most recently, right shoulder 1/12/18). She has had bilateral knee pain for approximately 2 years. She is s/p bilateral total knee replacement on 8/28/18 at [facility] by Dr. Gardner. No post-operative complications. Optimized for discharge to acute rehab.     Weight bearing: WBAT bilateral LEs  DVT prophylaxis lovenox 40mg subcutaneous daily for 14 days postop, then stop and start Aspirin 325mg PO BID for 4 weeks (31 Aug 2018 16:32)      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  Acid reflux  Asthma  Malignant neoplasm of ovary: Ovarian cancer  Umbilical hernia: Umbilical hernia  Breast lump: Breast mass in female  History of hernia repair: 2010  Surgery, elective: left shoulder arthroscopy- 2016  BILAT SHOULDER  Other postprocedural status: S/P appendectomy  Status post total hysterectomy: S/P DENNIS-BSO (total abdominal hysterectomy and bilateral salpingo-oophorectomy)      Subjective:  Pain controlled, bowels regular, no new complaints.     REVIEW OF SYMPTOMS  Pertinent in last 24 h: post-op pain, hypotension    VITALS  Vital Signs Last 24 Hrs  T(C): 36.7 (04 Sep 2018 08:31), Max: 36.7 (04 Sep 2018 08:31)  T(F): 98.1 (04 Sep 2018 08:31), Max: 98.1 (04 Sep 2018 08:31)  HR: 92 (04 Sep 2018 08:31) (76 - 92)  BP: 96/60 (04 Sep 2018 08:31) (96/60 - 140/62)  BP(mean): --  RR: 14 (04 Sep 2018 08:31) (14 - 16)  SpO2: 96% (04 Sep 2018 08:31) (96% - 98%)      PHYSICAL EXAM  General: NAD  Cardio: S1S2+  Resp: clear  Abdomen: soft, NT   Extrem: no edema, no significant calf tenderness.  Skin: Bilateral knee incisions: no drainage. no redness. (+)bilateral aquacel dressing      RECENT LABS                  RADIOLOGY/OTHER RESULTS      MEDICATIONS  (STANDING):  atorvastatin 20 milliGRAM(s) Oral at bedtime  cholecalciferol 1000 Unit(s) Oral daily  docusate sodium 100 milliGRAM(s) Oral three times a day  enoxaparin Injectable 40 milliGRAM(s) SubCutaneous every 24 hours  influenza   Vaccine 0.5 milliLiter(s) IntraMuscular once  losartan 25 milliGRAM(s) Oral daily  multivitamin 1 Tablet(s) Oral daily  pantoprazole    Tablet 40 milliGRAM(s) Oral before breakfast  polyethylene glycol 3350 17 Gram(s) Oral daily  senna 2 Tablet(s) Oral at bedtime    MEDICATIONS  (PRN):  acetaminophen   Tablet. 650 milliGRAM(s) Oral every 6 hours PRN Mild Pain (1 - 3)  aluminum hydroxide/magnesium hydroxide/simethicone Suspension 30 milliLiter(s) Oral every 6 hours PRN Dyspepsia  bisacodyl Suppository 10 milliGRAM(s) Rectal daily PRN Constipation  ondansetron    Tablet 4 milliGRAM(s) Oral every 8 hours PRN Nausea and/or Vomiting  oxyCODONE    IR 5 milliGRAM(s) Oral every 4 hours PRN Mild Pain (1 - 3)  oxyCODONE    IR 10 milliGRAM(s) Oral every 4 hours PRN Moderate Pain (4 - 6)  oxyCODONE    IR 15 milliGRAM(s) Oral every 4 hours PRN Severe Pain (7 - 10)

## 2018-09-04 NOTE — PROGRESS NOTE ADULT - SUBJECTIVE AND OBJECTIVE BOX
HPI  Pt is a 58 yo F admited to acute rehab for bilateral TKA secondary to bilateral knee OA  Pt was seen and examined at bedside. Hypotension noted again today. Pt denies of any HA, Blurry vision, dizziness, CP, palpitation. No new complaints. Pt states pain medication is helping with surgical pain.     Vital Signs Last 24 Hrs  T(C): 36.7 (04 Sep 2018 08:31), Max: 36.7 (04 Sep 2018 08:31)  T(F): 98.1 (04 Sep 2018 08:31), Max: 98.1 (04 Sep 2018 08:31)  HR: 92 (04 Sep 2018 08:31) (76 - 92)  BP: 96/60 (04 Sep 2018 08:31) (96/60 - 140/62)  BP(mean): --  RR: 14 (04 Sep 2018 08:31) (14 - 16)  SpO2: 96% (04 Sep 2018 08:31) (96% - 98%)    I&O's Summary      CAPILLARY BLOOD GLUCOSE          PHYSICAL EXAM:    Constitutional: NAD, awake and alert, well-developed  HEENT: PERR, EOMI, Normal Hearing, MMM  Neck: Soft and supple, No LAD, No JVD  Respiratory: Breath sounds are clear bilaterally, No wheezing, rales or rhonchi  Cardiovascular: S1 and S2, regular rate and rhythm, no Murmurs, gallops or rubs  Gastrointestinal: Bowel Sounds present, soft, nontender, nondistended, no guarding, no rebound  Extremities: bilateral knee incision covered by dressing, no erythema drainage. +1 bilateral lower ext swelling   Vascular: 2+ peripheral pulses  Neurological: A/O x 3,      MEDICATIONS:  MEDICATIONS  (STANDING):  atorvastatin 20 milliGRAM(s) Oral at bedtime  cholecalciferol 1000 Unit(s) Oral daily  docusate sodium 100 milliGRAM(s) Oral three times a day  enoxaparin Injectable 40 milliGRAM(s) SubCutaneous every 24 hours  influenza   Vaccine 0.5 milliLiter(s) IntraMuscular once  losartan 25 milliGRAM(s) Oral daily  multivitamin 1 Tablet(s) Oral daily  pantoprazole    Tablet 40 milliGRAM(s) Oral before breakfast  polyethylene glycol 3350 17 Gram(s) Oral daily  senna 2 Tablet(s) Oral at bedtime      LABS: All Labs Reviewed:                Blood Culture:     RADIOLOGY/EKG:    DVT PPX:    ADVANCED DIRECTIVE:    DISPOSITION:

## 2018-09-05 DIAGNOSIS — I95.1 ORTHOSTATIC HYPOTENSION: ICD-10-CM

## 2018-09-05 DIAGNOSIS — I08.1 RHEUMATIC DISORDERS OF BOTH MITRAL AND TRICUSPID VALVES: ICD-10-CM

## 2018-09-05 DIAGNOSIS — M21.162 VARUS DEFORMITY, NOT ELSEWHERE CLASSIFIED, LEFT KNEE: ICD-10-CM

## 2018-09-05 DIAGNOSIS — E78.5 HYPERLIPIDEMIA, UNSPECIFIED: ICD-10-CM

## 2018-09-05 DIAGNOSIS — M21.161 VARUS DEFORMITY, NOT ELSEWHERE CLASSIFIED, RIGHT KNEE: ICD-10-CM

## 2018-09-05 DIAGNOSIS — I10 ESSENTIAL (PRIMARY) HYPERTENSION: ICD-10-CM

## 2018-09-05 DIAGNOSIS — M17.0 BILATERAL PRIMARY OSTEOARTHRITIS OF KNEE: ICD-10-CM

## 2018-09-05 DIAGNOSIS — Z85.43 PERSONAL HISTORY OF MALIGNANT NEOPLASM OF OVARY: ICD-10-CM

## 2018-09-05 DIAGNOSIS — J45.20 MILD INTERMITTENT ASTHMA, UNCOMPLICATED: ICD-10-CM

## 2018-09-05 DIAGNOSIS — K21.9 GASTRO-ESOPHAGEAL REFLUX DISEASE WITHOUT ESOPHAGITIS: ICD-10-CM

## 2018-09-05 PROCEDURE — 99232 SBSQ HOSP IP/OBS MODERATE 35: CPT | Mod: GC

## 2018-09-05 PROCEDURE — 99232 SBSQ HOSP IP/OBS MODERATE 35: CPT

## 2018-09-05 RX ADMIN — OXYCODONE HYDROCHLORIDE 10 MILLIGRAM(S): 5 TABLET ORAL at 05:05

## 2018-09-05 RX ADMIN — Medication 1 TABLET(S): at 12:24

## 2018-09-05 RX ADMIN — SENNA PLUS 2 TABLET(S): 8.6 TABLET ORAL at 21:12

## 2018-09-05 RX ADMIN — Medication 650 MILLIGRAM(S): at 11:55

## 2018-09-05 RX ADMIN — Medication 100 MILLIGRAM(S): at 06:03

## 2018-09-05 RX ADMIN — OXYCODONE HYDROCHLORIDE 10 MILLIGRAM(S): 5 TABLET ORAL at 22:00

## 2018-09-05 RX ADMIN — Medication 1000 UNIT(S): at 12:24

## 2018-09-05 RX ADMIN — OXYCODONE HYDROCHLORIDE 10 MILLIGRAM(S): 5 TABLET ORAL at 04:12

## 2018-09-05 RX ADMIN — Medication 100 MILLIGRAM(S): at 21:11

## 2018-09-05 RX ADMIN — POLYETHYLENE GLYCOL 3350 17 GRAM(S): 17 POWDER, FOR SOLUTION ORAL at 21:12

## 2018-09-05 RX ADMIN — LOSARTAN POTASSIUM 25 MILLIGRAM(S): 100 TABLET, FILM COATED ORAL at 06:03

## 2018-09-05 RX ADMIN — PANTOPRAZOLE SODIUM 40 MILLIGRAM(S): 20 TABLET, DELAYED RELEASE ORAL at 06:04

## 2018-09-05 RX ADMIN — OXYCODONE HYDROCHLORIDE 10 MILLIGRAM(S): 5 TABLET ORAL at 16:31

## 2018-09-05 RX ADMIN — Medication 650 MILLIGRAM(S): at 11:05

## 2018-09-05 RX ADMIN — OXYCODONE HYDROCHLORIDE 15 MILLIGRAM(S): 5 TABLET ORAL at 07:52

## 2018-09-05 RX ADMIN — ATORVASTATIN CALCIUM 20 MILLIGRAM(S): 80 TABLET, FILM COATED ORAL at 21:12

## 2018-09-05 RX ADMIN — OXYCODONE HYDROCHLORIDE 15 MILLIGRAM(S): 5 TABLET ORAL at 12:24

## 2018-09-05 RX ADMIN — OXYCODONE HYDROCHLORIDE 15 MILLIGRAM(S): 5 TABLET ORAL at 13:40

## 2018-09-05 RX ADMIN — OXYCODONE HYDROCHLORIDE 10 MILLIGRAM(S): 5 TABLET ORAL at 17:35

## 2018-09-05 RX ADMIN — OXYCODONE HYDROCHLORIDE 10 MILLIGRAM(S): 5 TABLET ORAL at 21:12

## 2018-09-05 RX ADMIN — ENOXAPARIN SODIUM 40 MILLIGRAM(S): 100 INJECTION SUBCUTANEOUS at 17:36

## 2018-09-05 RX ADMIN — Medication 100 MILLIGRAM(S): at 15:20

## 2018-09-05 RX ADMIN — OXYCODONE HYDROCHLORIDE 15 MILLIGRAM(S): 5 TABLET ORAL at 08:30

## 2018-09-05 NOTE — PROGRESS NOTE ADULT - SUBJECTIVE AND OBJECTIVE BOX
HPI  Pt is a 56 yo F admitted to acute rehab for bilateral TKA secondary to bilateral knee OA  Pt was seen and examined at bedside. Pt states she is feeling well. Denies of any HA, blurry vision, dizziness, CP, SOB, palpitation.     Vital Signs Last 24 Hrs  T(C): 36.7 (05 Sep 2018 08:10), Max: 36.7 (05 Sep 2018 08:10)  T(F): 98 (05 Sep 2018 08:10), Max: 98 (05 Sep 2018 08:10)  HR: 91 (05 Sep 2018 08:10) (78 - 91)  BP: 99/61 (05 Sep 2018 08:10) (99/61 - 132/86)  BP(mean): --  RR: 14 (05 Sep 2018 08:10) (14 - 16)  SpO2: 96% (05 Sep 2018 08:10) (96% - 98%)    I&O's Summary      CAPILLARY BLOOD GLUCOSE        PHYSICAL EXAM:    Constitutional: NAD, awake and alert, well-developed  HEENT: PERR, EOMI, Normal Hearing, MMM  Neck: Soft and supple, No LAD, No JVD  Respiratory: Breath sounds are clear bilaterally, No wheezing, rales or rhonchi  Cardiovascular: S1 and S2, regular rate and rhythm, no Murmurs, gallops or rubs  Gastrointestinal: Bowel Sounds present, soft, nontender, nondistended, no guarding, no rebound  Extremities: bilateral knee incision covered by dressing, no erythema drainage. +1 bilateral lower ext swelling   Vascular: 2+ peripheral pulses  Neurological: A/O x 3    MEDICATIONS:  MEDICATIONS  (STANDING):  atorvastatin 20 milliGRAM(s) Oral at bedtime  cholecalciferol 1000 Unit(s) Oral daily  docusate sodium 100 milliGRAM(s) Oral three times a day  enoxaparin Injectable 40 milliGRAM(s) SubCutaneous every 24 hours  influenza   Vaccine 0.5 milliLiter(s) IntraMuscular once  losartan 25 milliGRAM(s) Oral daily  multivitamin 1 Tablet(s) Oral daily  pantoprazole    Tablet 40 milliGRAM(s) Oral before breakfast  polyethylene glycol 3350 17 Gram(s) Oral daily  senna 2 Tablet(s) Oral at bedtime      LABS: All Labs Reviewed:                Blood Culture:     RADIOLOGY/EKG:    DVT PPX:    ADVANCED DIRECTIVE:    DISPOSITION:

## 2018-09-05 NOTE — PROGRESS NOTE ADULT - SUBJECTIVE AND OBJECTIVE BOX
HPI:  Ms. Bagley is a 57 year old F with PMH HTN, HLD, bilateral knee OA, asthma (worsens when she goes to the [facility]), bilateral rotator cuff repair (most recently, right shoulder 1/12/18). She has had bilateral knee pain for approximately 2 years. She is s/p bilateral total knee replacement on 8/28/18 at [facility] by Dr. Gardner. No post-operative complications. Optimized for discharge to acute rehab.     Weight bearing: WBAT bilateral LEs  DVT prophylaxis lovenox 40mg subcutaneous daily for 14 days postop, then stop and start Aspirin 325mg PO BID for 4 weeks (31 Aug 2018 16:32)      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  Acid reflux  Asthma  Malignant neoplasm of ovary: Ovarian cancer  Umbilical hernia: Umbilical hernia  Breast lump: Breast mass in female  History of hernia repair: 2010  Surgery, elective: left shoulder arthroscopy- 2016  BILAT SHOULDER  Other postprocedural status: S/P appendectomy  Status post total hysterectomy: S/P DENNIS-BSO (total abdominal hysterectomy and bilateral salpingo-oophorectomy)      Subjective:  Pain controlled, bowels regular     REVIEW OF SYMPTOMS  Pertinent in last 24 h: post-op pain, hypotension      VITALS  Vital Signs Last 24 Hrs  T(C): 36.7 (05 Sep 2018 08:10), Max: 36.7 (05 Sep 2018 08:10)  T(F): 98 (05 Sep 2018 08:10), Max: 98 (05 Sep 2018 08:10)  HR: 91 (05 Sep 2018 08:10) (78 - 91)  BP: 99/61 (05 Sep 2018 08:10) (99/61 - 132/86)  BP(mean): --  RR: 14 (05 Sep 2018 08:10) (14 - 16)  SpO2: 96% (05 Sep 2018 08:10) (96% - 98%)      PHYSICAL EXAM  General: NAD  Cardio: S1S2+  Resp: clear  Abdomen: soft, NT   Extrem: no edema, no significant calf tenderness.  Skin: Bilateral knee incisions: no drainage. no redness. (+)bilateral aquacel dressing    RECENT LABS                  RADIOLOGY/OTHER RESULTS      MEDICATIONS  (STANDING):  atorvastatin 20 milliGRAM(s) Oral at bedtime  cholecalciferol 1000 Unit(s) Oral daily  docusate sodium 100 milliGRAM(s) Oral three times a day  enoxaparin Injectable 40 milliGRAM(s) SubCutaneous every 24 hours  influenza   Vaccine 0.5 milliLiter(s) IntraMuscular once  losartan 25 milliGRAM(s) Oral daily  multivitamin 1 Tablet(s) Oral daily  pantoprazole    Tablet 40 milliGRAM(s) Oral before breakfast  polyethylene glycol 3350 17 Gram(s) Oral daily  senna 2 Tablet(s) Oral at bedtime    MEDICATIONS  (PRN):  acetaminophen   Tablet. 650 milliGRAM(s) Oral every 6 hours PRN Mild Pain (1 - 3)  aluminum hydroxide/magnesium hydroxide/simethicone Suspension 30 milliLiter(s) Oral every 6 hours PRN Dyspepsia  bisacodyl Suppository 10 milliGRAM(s) Rectal daily PRN Constipation  ondansetron    Tablet 4 milliGRAM(s) Oral every 8 hours PRN Nausea and/or Vomiting  oxyCODONE    IR 5 milliGRAM(s) Oral every 4 hours PRN Mild Pain (1 - 3)  oxyCODONE    IR 10 milliGRAM(s) Oral every 4 hours PRN Moderate Pain (4 - 6)  oxyCODONE    IR 15 milliGRAM(s) Oral every 4 hours PRN Severe Pain (7 - 10)

## 2018-09-05 NOTE — PROGRESS NOTE ADULT - ASSESSMENT
A/P:57 year old female with bilateral knee OA, now s/p bilateral TKA    S/p bilateral TKA   -Rehab:  PT/OT for 3 hours per day,    DVT ppx: Lovenox 40mg subcutaneous daily for 14 days postop, then stop and start Aspirin 325mg PO BID for 4 weeks    Pain control: Oxycodone prn, tylenol prn, celebrex 200 mg PO BID    HTN: BP running low--SBP .  Losartan was decreased yesterday.  will d/c  HCTZ. monitor BP.     Hyperlipidemia: on atorvastatin    Bowel: on colace, senna, miralax
Pt is a 58 yo F admitted to acute rehab for bilateral TKA secondary to bilateral knee OA    *bilateral TKA secondary to bilateral knee OA  continue acute rehab, PT/OT  pain management   fall precaution   DVT ppx on lovenox 14d post op then ASA per ortho     *Hypotension with history of HTN  DC HCTZ today   Losartan is reduced to 25mg qd  Likely multifactorial with pain meds  Encourage PO fluid intake     *HLD  Continue Statin     *Prophylactic measure   Bowel regimen   DVT  ppx
A/P:57 year old female with bilateral knee OA, now s/p bilateral TKA    S/p bilateral TKA   -Rehab:  PT/OT for 3 hours per day,    DVT ppx: Lovenox 40mg subcutaneous daily for 14 days postop, then stop and start Aspirin 325mg PO BID for 4 weeks    Pain control: Oxycodone prn, tylenol prn, celebrex 200 mg PO BID    HTN: BP improved today. cont. to monitor BP.     Hyperlipidemia: on atorvastatin    Bowel: on colace, senna, miralax
Pt is a 56 yo F admitted to acute rehab for bilateral TKA secondary to bilateral knee OA    *bilateral TKA secondary to bilateral knee OA  continue acute rehab, PT/OT  pain management   fall precaution   DVT ppx on lovenox 14d post op then ASA per ortho     *Hypotension with history of HTN  DC HCTZ yesterday    Losartan is recently reduced to 25mg qd  Likely multifactorial with pain meds  Encourage PO fluid intake   Keep MAP >65    pain meds as tolerated     *HLD  Continue Statin     *Prophylactic measure   Bowel regimen   DVT  ppx

## 2018-09-06 PROCEDURE — 99232 SBSQ HOSP IP/OBS MODERATE 35: CPT

## 2018-09-06 RX ADMIN — OXYCODONE HYDROCHLORIDE 10 MILLIGRAM(S): 5 TABLET ORAL at 21:38

## 2018-09-06 RX ADMIN — Medication 1 TABLET(S): at 11:58

## 2018-09-06 RX ADMIN — SENNA PLUS 2 TABLET(S): 8.6 TABLET ORAL at 21:38

## 2018-09-06 RX ADMIN — ATORVASTATIN CALCIUM 20 MILLIGRAM(S): 80 TABLET, FILM COATED ORAL at 21:38

## 2018-09-06 RX ADMIN — OXYCODONE HYDROCHLORIDE 15 MILLIGRAM(S): 5 TABLET ORAL at 08:48

## 2018-09-06 RX ADMIN — Medication 100 MILLIGRAM(S): at 05:42

## 2018-09-06 RX ADMIN — Medication 650 MILLIGRAM(S): at 01:38

## 2018-09-06 RX ADMIN — Medication 100 MILLIGRAM(S): at 11:58

## 2018-09-06 RX ADMIN — Medication 650 MILLIGRAM(S): at 18:22

## 2018-09-06 RX ADMIN — OXYCODONE HYDROCHLORIDE 15 MILLIGRAM(S): 5 TABLET ORAL at 15:00

## 2018-09-06 RX ADMIN — Medication 100 MILLIGRAM(S): at 21:38

## 2018-09-06 RX ADMIN — POLYETHYLENE GLYCOL 3350 17 GRAM(S): 17 POWDER, FOR SOLUTION ORAL at 21:38

## 2018-09-06 RX ADMIN — Medication 1000 UNIT(S): at 11:58

## 2018-09-06 RX ADMIN — OXYCODONE HYDROCHLORIDE 15 MILLIGRAM(S): 5 TABLET ORAL at 08:02

## 2018-09-06 RX ADMIN — OXYCODONE HYDROCHLORIDE 10 MILLIGRAM(S): 5 TABLET ORAL at 22:30

## 2018-09-06 RX ADMIN — LOSARTAN POTASSIUM 25 MILLIGRAM(S): 100 TABLET, FILM COATED ORAL at 05:42

## 2018-09-06 RX ADMIN — Medication 650 MILLIGRAM(S): at 11:57

## 2018-09-06 RX ADMIN — Medication 650 MILLIGRAM(S): at 12:36

## 2018-09-06 RX ADMIN — OXYCODONE HYDROCHLORIDE 15 MILLIGRAM(S): 5 TABLET ORAL at 14:36

## 2018-09-06 RX ADMIN — PANTOPRAZOLE SODIUM 40 MILLIGRAM(S): 20 TABLET, DELAYED RELEASE ORAL at 05:42

## 2018-09-06 RX ADMIN — Medication 650 MILLIGRAM(S): at 02:10

## 2018-09-06 RX ADMIN — Medication 650 MILLIGRAM(S): at 17:57

## 2018-09-06 RX ADMIN — ENOXAPARIN SODIUM 40 MILLIGRAM(S): 100 INJECTION SUBCUTANEOUS at 17:46

## 2018-09-06 NOTE — PROGRESS NOTE ADULT - SUBJECTIVE AND OBJECTIVE BOX
HPI:  Ms. Bagley is a 57 year old F with PMH HTN, HLD, bilateral knee OA, asthma (worsens when she goes to the [facility]), bilateral rotator cuff repair (most recently, right shoulder 1/12/18). She has had bilateral knee pain for approximately 2 years. She is s/p bilateral total knee replacement on 8/28/18 at [facility] by Dr. Gardner. No post-operative complications. Optimized for discharge to acute rehab.     Weight bearing: WBAT bilateral LEs  DVT prophylaxis lovenox 40mg subcutaneous daily for 14 days postop, then stop and start Aspirin 325mg PO BID for 4 weeks (31 Aug 2018 16:32)    SUBJECTIVE  Seen and examined. C/o mild L shoulder pain, otherwise doing well. + BM. Denies other complaints.     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  Acid reflux  Asthma  Malignant neoplasm of ovary: Ovarian cancer  Umbilical hernia: Umbilical hernia  Breast lump: Breast mass in female  History of hernia repair: 2010  Surgery, elective: left shoulder arthroscopy- 2016  BILAT SHOULDER  Other postprocedural status: S/P appendectomy  Status post total hysterectomy: S/P DENNIS-BSO (total abdominal hysterectomy and bilateral salpingo-oophorectomy)    REVIEW OF SYMPTOMS  Pertinent in last 24 h: post-op pain, hypotension      VITALS  Vital Signs Last 24 Hrs  T(C): 36.7 (05 Sep 2018 08:10), Max: 36.7 (05 Sep 2018 08:10)  T(F): 98 (05 Sep 2018 08:10), Max: 98 (05 Sep 2018 08:10)  HR: 91 (05 Sep 2018 08:10) (78 - 91)  BP: 99/61 (05 Sep 2018 08:10) (99/61 - 132/86)  BP(mean): --  RR: 14 (05 Sep 2018 08:10) (14 - 16)  SpO2: 96% (05 Sep 2018 08:10) (96% - 98%)      PHYSICAL EXAM  General: NAD  Cardio: S1S2+  Resp: clear  Abdomen: soft, NT   Extrem: no edema, no significant calf tenderness.  Skin: Bilateral knee incisions: no drainage. no redness. (+)bilateral aquacel dressing    RECENT LABS  RADIOLOGY/OTHER RESULTS      MEDICATIONS  (STANDING):  atorvastatin 20 milliGRAM(s) Oral at bedtime  cholecalciferol 1000 Unit(s) Oral daily  docusate sodium 100 milliGRAM(s) Oral three times a day  enoxaparin Injectable 40 milliGRAM(s) SubCutaneous every 24 hours  influenza   Vaccine 0.5 milliLiter(s) IntraMuscular once  losartan 25 milliGRAM(s) Oral daily  multivitamin 1 Tablet(s) Oral daily  pantoprazole    Tablet 40 milliGRAM(s) Oral before breakfast  polyethylene glycol 3350 17 Gram(s) Oral daily  senna 2 Tablet(s) Oral at bedtime    MEDICATIONS  (PRN):  acetaminophen   Tablet. 650 milliGRAM(s) Oral every 6 hours PRN Mild Pain (1 - 3)  aluminum hydroxide/magnesium hydroxide/simethicone Suspension 30 milliLiter(s) Oral every 6 hours PRN Dyspepsia  bisacodyl Suppository 10 milliGRAM(s) Rectal daily PRN Constipation  ondansetron    Tablet 4 milliGRAM(s) Oral every 8 hours PRN Nausea and/or Vomiting  oxyCODONE    IR 5 milliGRAM(s) Oral every 4 hours PRN Mild Pain (1 - 3)  oxyCODONE    IR 10 milliGRAM(s) Oral every 4 hours PRN Moderate Pain (4 - 6)  oxyCODONE    IR 15 milliGRAM(s) Oral every 4 hours PRN Severe Pain (7 - 10) HPI:  Ms. Bagley is a 57 year old F with PMH HTN, HLD, bilateral knee OA, asthma (worsens when she goes to the [facility]), bilateral rotator cuff repair (most recently, right shoulder 1/12/18). She has had bilateral knee pain for approximately 2 years. She is s/p bilateral total knee replacement on 8/28/18 at [facility] by Dr. Gardner. No post-operative complications. Optimized for discharge to acute rehab.     Weight bearing: WBAT bilateral LEs  DVT prophylaxis lovenox 40mg subcutaneous daily for 14 days postop, then stop and start Aspirin 325mg PO BID for 4 weeks (31 Aug 2018 16:32)    SUBJECTIVE  Seen and examined. C/o mild L shoulder pain, otherwise doing well. + BM. Denies other complaints.     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  Acid reflux  Asthma  Malignant neoplasm of ovary: Ovarian cancer  Umbilical hernia: Umbilical hernia  Breast lump: Breast mass in female  History of hernia repair: 2010  Surgery, elective: left shoulder arthroscopy- 2016  BILAT SHOULDER  Other postprocedural status: S/P appendectomy  Status post total hysterectomy: S/P DENNIS-BSO (total abdominal hysterectomy and bilateral salpingo-oophorectomy)    REVIEW OF SYMPTOMS  [X] Constiutional WNL        [X] Cardio WNL              [X] Resp WNL  [ x ] GI WNL                            [ x ]  WNL                    [x  ] Heme WNL  [X] Endo WNL                       [X] Skin WNL                  [  ] MSK WNL  [X] Neuro WNL                     [X] Cognitive WNL         [X] Psych WNL      VITALS  Vital Signs Last 24 Hrs  T(C): 36.7 (06 Sep 2018 07:42), Max: 37 (05 Sep 2018 20:35)  T(F): 98 (06 Sep 2018 07:42), Max: 98.6 (05 Sep 2018 20:35)  HR: 94 (06 Sep 2018 07:42) (89 - 94)  BP: 122/83 (06 Sep 2018 07:42) (114/74 - 122/83)  RR: 14 (06 Sep 2018 07:42) (14 - 14)  SpO2: 95% (06 Sep 2018 07:42) (95% - 96%)    PHYSICAL EXAM  General: NAD  Cardio: S1S2+  Resp: clear  Abdomen: soft, NT   Extrem: no edema, no significant calf tenderness.  Skin: Bilateral knee incisions: no drainage. no redness. (+) bilateral aquacel dressing    RECENT LABS    RADIOLOGY/OTHER RESULTS    MEDICATIONS  (STANDING):  atorvastatin 20 milliGRAM(s) Oral at bedtime  cholecalciferol 1000 Unit(s) Oral daily  docusate sodium 100 milliGRAM(s) Oral three times a day  enoxaparin Injectable 40 milliGRAM(s) SubCutaneous every 24 hours  influenza   Vaccine 0.5 milliLiter(s) IntraMuscular once  losartan 25 milliGRAM(s) Oral daily  multivitamin 1 Tablet(s) Oral daily  pantoprazole    Tablet 40 milliGRAM(s) Oral before breakfast  polyethylene glycol 3350 17 Gram(s) Oral daily  senna 2 Tablet(s) Oral at bedtime    MEDICATIONS  (PRN):  acetaminophen   Tablet. 650 milliGRAM(s) Oral every 6 hours PRN Mild Pain (1 - 3)  aluminum hydroxide/magnesium hydroxide/simethicone Suspension 30 milliLiter(s) Oral every 6 hours PRN Dyspepsia  bisacodyl Suppository 10 milliGRAM(s) Rectal daily PRN Constipation  ondansetron    Tablet 4 milliGRAM(s) Oral every 8 hours PRN Nausea and/or Vomiting  oxyCODONE    IR 5 milliGRAM(s) Oral every 4 hours PRN Mild Pain (1 - 3)  oxyCODONE    IR 10 milliGRAM(s) Oral every 4 hours PRN Moderate Pain (4 - 6)  oxyCODONE    IR 15 milliGRAM(s) Oral every 4 hours PRN Severe Pain (7 - 10)    ASSESSMENT AND PLAN:  Ms. Bagley is 57 year old F s/p bilateral TKA on 8/28 presenting with functional, gait, ADL impairments     #S/p bilateral TKA   -Rehab: Comprehensive PT/OT for 3 hours per day, goals include mod I with stair negotiation, mod I with mobility, transfers and ADLs. Team meeting today: overall Supervision level, KATHRYN 9/9.   -WBAT bilateral LEs  -DVT ppx: Lovenox 40mg subcutaneous daily for 14 days postop, then stop and start Aspirin 325mg PO BID for 4 weeks  -Pain control: Oxycodone prn, tylenol prn, celebrex 200 mg PO BID x 4 days   -Dressings to be removed 10 days post op (9/7)    #HTN: Stable on losartan    #HLD  -atorvastatin    #Resp  -History of asthma, stable     #GI  -Bowel regimen: senna, colace, miralax, bisacodyl suppository prn   -PPx: Protonix  -Nutrition: Vit D, MVI

## 2018-09-07 ENCOUNTER — TRANSCRIPTION ENCOUNTER (OUTPATIENT)
Age: 58
End: 2018-09-07

## 2018-09-07 LAB — SURGICAL PATHOLOGY STUDY: SIGNIFICANT CHANGE UP

## 2018-09-07 PROCEDURE — 99232 SBSQ HOSP IP/OBS MODERATE 35: CPT

## 2018-09-07 PROCEDURE — 93970 EXTREMITY STUDY: CPT | Mod: 26

## 2018-09-07 RX ORDER — ASPIRIN/CALCIUM CARB/MAGNESIUM 324 MG
1 TABLET ORAL
Qty: 56 | Refills: 0
Start: 2018-09-07 | End: 2018-10-04

## 2018-09-07 RX ORDER — OXYCODONE HYDROCHLORIDE 5 MG/1
1 TABLET ORAL
Qty: 28 | Refills: 0
Start: 2018-09-07 | End: 2018-09-13

## 2018-09-07 RX ORDER — POLYETHYLENE GLYCOL 3350 17 G/17G
17 POWDER, FOR SOLUTION ORAL
Qty: 0 | Refills: 0 | DISCHARGE
Start: 2018-09-07

## 2018-09-07 RX ORDER — ASPIRIN/CALCIUM CARB/MAGNESIUM 324 MG
1 TABLET ORAL
Qty: 56 | Refills: 0 | OUTPATIENT
Start: 2018-09-07 | End: 2018-10-04

## 2018-09-07 RX ORDER — DOCUSATE SODIUM 100 MG
1 CAPSULE ORAL
Qty: 0 | Refills: 0 | DISCHARGE
Start: 2018-09-07

## 2018-09-07 RX ORDER — ACETAMINOPHEN 500 MG
2 TABLET ORAL
Qty: 0 | Refills: 0 | DISCHARGE
Start: 2018-09-07

## 2018-09-07 RX ORDER — LOSARTAN POTASSIUM 100 MG/1
1 TABLET, FILM COATED ORAL
Qty: 30 | Refills: 0
Start: 2018-09-07

## 2018-09-07 RX ORDER — CHOLECALCIFEROL (VITAMIN D3) 125 MCG
1000 CAPSULE ORAL
Qty: 0 | Refills: 0 | COMMUNITY
Start: 2018-09-07

## 2018-09-07 RX ORDER — CHOLECALCIFEROL (VITAMIN D3) 125 MCG
0 CAPSULE ORAL
Qty: 0 | Refills: 0 | COMMUNITY

## 2018-09-07 RX ORDER — SENNA PLUS 8.6 MG/1
2 TABLET ORAL
Qty: 0 | Refills: 0 | DISCHARGE
Start: 2018-09-07

## 2018-09-07 RX ORDER — LOSARTAN POTASSIUM 100 MG/1
1 TABLET, FILM COATED ORAL
Qty: 0 | Refills: 0 | COMMUNITY
Start: 2018-09-07

## 2018-09-07 RX ORDER — ATORVASTATIN CALCIUM 80 MG/1
1 TABLET, FILM COATED ORAL
Qty: 0 | Refills: 0 | COMMUNITY

## 2018-09-07 RX ORDER — LIDOCAINE 4 G/100G
1 CREAM TOPICAL DAILY
Qty: 0 | Refills: 0 | Status: DISCONTINUED | OUTPATIENT
Start: 2018-09-07 | End: 2018-09-08

## 2018-09-07 RX ORDER — LOSARTAN/HYDROCHLOROTHIAZIDE 100MG-25MG
1 TABLET ORAL
Qty: 0 | Refills: 0 | COMMUNITY

## 2018-09-07 RX ORDER — PANTOPRAZOLE SODIUM 20 MG/1
1 TABLET, DELAYED RELEASE ORAL
Qty: 30 | Refills: 0
Start: 2018-09-07 | End: 2018-10-06

## 2018-09-07 RX ORDER — ATORVASTATIN CALCIUM 80 MG/1
1 TABLET, FILM COATED ORAL
Qty: 0 | Refills: 0 | COMMUNITY
Start: 2018-09-07

## 2018-09-07 RX ADMIN — PANTOPRAZOLE SODIUM 40 MILLIGRAM(S): 20 TABLET, DELAYED RELEASE ORAL at 05:41

## 2018-09-07 RX ADMIN — OXYCODONE HYDROCHLORIDE 10 MILLIGRAM(S): 5 TABLET ORAL at 02:26

## 2018-09-07 RX ADMIN — ATORVASTATIN CALCIUM 20 MILLIGRAM(S): 80 TABLET, FILM COATED ORAL at 20:32

## 2018-09-07 RX ADMIN — Medication 100 MILLIGRAM(S): at 13:56

## 2018-09-07 RX ADMIN — Medication 1 TABLET(S): at 12:37

## 2018-09-07 RX ADMIN — OXYCODONE HYDROCHLORIDE 15 MILLIGRAM(S): 5 TABLET ORAL at 13:56

## 2018-09-07 RX ADMIN — OXYCODONE HYDROCHLORIDE 10 MILLIGRAM(S): 5 TABLET ORAL at 20:34

## 2018-09-07 RX ADMIN — Medication 1000 UNIT(S): at 12:36

## 2018-09-07 RX ADMIN — LOSARTAN POTASSIUM 25 MILLIGRAM(S): 100 TABLET, FILM COATED ORAL at 05:41

## 2018-09-07 RX ADMIN — LIDOCAINE 1 PATCH: 4 CREAM TOPICAL at 12:37

## 2018-09-07 RX ADMIN — OXYCODONE HYDROCHLORIDE 10 MILLIGRAM(S): 5 TABLET ORAL at 21:30

## 2018-09-07 RX ADMIN — Medication 100 MILLIGRAM(S): at 20:33

## 2018-09-07 RX ADMIN — Medication 100 MILLIGRAM(S): at 05:41

## 2018-09-07 RX ADMIN — OXYCODONE HYDROCHLORIDE 15 MILLIGRAM(S): 5 TABLET ORAL at 08:15

## 2018-09-07 RX ADMIN — POLYETHYLENE GLYCOL 3350 17 GRAM(S): 17 POWDER, FOR SOLUTION ORAL at 20:33

## 2018-09-07 RX ADMIN — Medication 650 MILLIGRAM(S): at 12:36

## 2018-09-07 RX ADMIN — OXYCODONE HYDROCHLORIDE 10 MILLIGRAM(S): 5 TABLET ORAL at 03:30

## 2018-09-07 RX ADMIN — SENNA PLUS 2 TABLET(S): 8.6 TABLET ORAL at 20:33

## 2018-09-07 RX ADMIN — ENOXAPARIN SODIUM 40 MILLIGRAM(S): 100 INJECTION SUBCUTANEOUS at 17:53

## 2018-09-07 NOTE — DISCHARGE NOTE ADULT - CARE PROVIDER_API CALL
Miguel Gardner), Orthopaedic Surgery  [address] [facility]  [address] [place] [address]  Phone: [phone]  Fax: [phone]    Violeta Page), PhysicalRehab Medicine  [address] [place] [address]  Phone: [phone]  Fax: [phone]

## 2018-09-07 NOTE — DISCHARGE NOTE ADULT - MEDICATION SUMMARY - MEDICATIONS TO TAKE
I will START or STAY ON the medications listed below when I get home from the hospital:    acetaminophen 325 mg oral tablet  -- 2 tab(s) by mouth every 6 hours, As needed, Mild Pain (1 - 3)  -- Indication: For Pain    aspirin 325 mg oral delayed release tablet  -- 1 tab(s) by mouth every 12 hours  Start on 9/12/18  -- Indication: For DVT prophylaxis    oxyCODONE 10 mg oral tablet  -- 1 tab(s) by mouth every 6 hours, As Needed -for severe pain MDD:40 mg  -- Caution federal law prohibits the transfer of this drug to any person other  than the person for whom it was prescribed.  Check with your doctor before becoming pregnant.  Do not drink alcoholic beverages when taking this medication.  May cause drowsiness.  Alcohol may intensify this effect.  Use care when operating dangerous machinery.  This drug may impair the ability to drive or operate machinery.  Use care until you become familiar with its effects.  This prescription cannot be refilled.  Using more of this medication than prescribed may cause serious breathing problems.    -- Indication: For Pain    losartan 25 mg oral tablet  -- 1 tab(s) by mouth once a day  -- Indication: For High blood pressure    enoxaparin  -- 40mg subcutaneous injection daily for 14 days after surgery.  Last day of administration: 9/11/18.  Then start ecotrin 325mg mg bid for an additional 4weeks  -- Indication: For DVT ppx    atorvastatin 20 mg oral tablet  -- 1 tab(s) by mouth once a day (at bedtime)  -- Indication: For cholesterol    docusate sodium 100 mg oral capsule  -- 1 cap(s) by mouth 3 times a day  -- Indication: For bowels-as needed    polyethylene glycol 3350 oral powder for reconstitution  -- 17 gram(s) by mouth once a day  -- Indication: For bowels-as needed    senna oral tablet  -- 2 tab(s) by mouth once a day (at bedtime)  -- Indication: For bowels--as needed    pantoprazole 40 mg oral delayed release tablet  -- 1 tab(s) by mouth once a day (before a meal)     TAKE WHILE TAKING ASPIRIN 325 mg, then discontinue   -- Indication: For GI ppx    Multiple Vitamins oral tablet  -- 1 tab(s) by mouth once a day  -- Indication: For supplement    cholecalciferol oral tablet  -- 1000 unit(s) by mouth once a day  -- Indication: For supplement

## 2018-09-07 NOTE — CHART NOTE - NSCHARTNOTEFT_GEN_A_CORE
Nutrition Follow Up:     Source: Patient [x]    Family [ ]     other [x]: medical chart reviewed     Diet : DASH/TLC    HPI: Pt is 57 year old F s/p bilateral TKA on 8/28 presenting with functional, gait, ADL impairments.    Pt seen at bedside this am. Observed good po intake at breakfast. Pt states that she is enjoying meals. Noted po % per flow sheets. Pt with questions regarding bean/nut consumption for arthritis management. Nutrition recommendations reviewed with pt c report of good understanding.         Current Weight: (8/31) 135.8lbs- no new weight available  % Weight Change    Pertinent Medications: maalox, lipitor, dulcolax, vitamin D3, colace, MVI     Pertinent Labs:   (9/1) glucose 125       Skin: Surgical incision bilateral knees  Edema: +2 bilateral knees per flow sheets  Last BM: 9/6    Estimated Needs:   [x] no change since previous assessment  [ ] recalculated:       Previous Nutrition Diagnosis: [x] Increased Nutrient Needs - MVI daily. Pt consuming % po at meals with report of good appetite.        Nutrition Diagnosis is [x] ongoing  [ ] resolved [ ] not applicable          New Nutrition Diagnosis: [x] not applicable      Interventions:     1. Recommend continue DASH/TLC diet  2. Encourage po intake >75% at meals  3. Obtain current weight + weekly weights  4. Ongoing nutrition education as needed      Monitoring and Evaluation:     [x] PO intake [ ] Tolerance to diet prescription [x] weights [x] follow up per protocol    RD to remain available  Camila Ratliff RDN

## 2018-09-07 NOTE — DISCHARGE NOTE ADULT - CARE PROVIDERS DIRECT ADDRESSES
[email or web address]

## 2018-09-07 NOTE — DISCHARGE NOTE ADULT - HOSPITAL COURSE
Ms. Bagley is a 57 year old F with PMH HTN, HLD, bilateral knee OA, asthma (worsens when she goes to the [facility]), bilateral rotator cuff repair (most recently, right shoulder 1/12/18). She has had bilateral knee pain for approximately 2 years. She is s/p bilateral total knee replacement on 8/28/18 at [facility] by Dr. Gardner. No post-operative complications. Optimized for discharge to acute rehab.     Admitted to acute inpatient rehab on 8/31/18. Tolerated daily therapies with improvement in function. Rehab course overall uncomplicated (EKG on 9/2, unchanged). Acquacell dressing changed on 9/2 due to saturation/serous leaking at distal end; bilateral Acquacells removed on 9/7. Losartan decreased to 25 mg daily due to hypotension; patient to be discharged on this dose. Bilateral LE venous doppler negative on 9/7. For DVT ppx, discharged on lovenox 40 mg SQ until 9/11, then to start  mg BID x 28 days. Stable for discharge to home with outpatient therapy.

## 2018-09-07 NOTE — PROGRESS NOTE ADULT - SUBJECTIVE AND OBJECTIVE BOX
HPI:  Ms. Bagley is a 57 year old F with PMH HTN, HLD, bilateral knee OA, asthma (worsens when she goes to the [facility]), bilateral rotator cuff repair (most recently, right shoulder 1/12/18). She has had bilateral knee pain for approximately 2 years. She is s/p bilateral total knee replacement on 8/28/18 at [facility] by Dr. Gardner. No post-operative complications. Optimized for discharge to acute rehab.     Weight bearing: WBAT bilateral LEs  DVT prophylaxis lovenox 40mg subcutaneous daily for 14 days postop, then stop and start Aspirin 325mg PO BID for 4 weeks (31 Aug 2018 16:32)    SUBJECTIVE  Seen and examined. C/o continued left shoulder pain. C/o right > left calf pain.     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  Acid reflux  Asthma  Malignant neoplasm of ovary: Ovarian cancer  Umbilical hernia: Umbilical hernia  Breast lump: Breast mass in female  History of hernia repair: 2010  Surgery, elective: left shoulder arthroscopy- 2016  BILAT SHOULDER  Other postprocedural status: S/P appendectomy  Status post total hysterectomy: S/P DENNIS-BSO (total abdominal hysterectomy and bilateral salpingo-oophorectomy)    REVIEW OF SYMPTOMS  [X] Constiutional WNL        [X] Cardio WNL              [X] Resp WNL  [ x ] GI WNL                            [ x ]  WNL                    [   ] Heme WNL  [X] Endo WNL                       [X] Skin WNL                  [  ] MSK WNL  [X] Neuro WNL                     [X] Cognitive WNL         [X] Psych WNL    VITALS  Vital Signs Last 24 Hrs  T(C): 36.9 (06 Sep 2018 21:42), Max: 36.9 (06 Sep 2018 21:42)  T(F): 98.4 (06 Sep 2018 21:42), Max: 98.4 (06 Sep 2018 21:42)  HR: 84 (07 Sep 2018 05:43) (84 - 87)  BP: 130/82 (07 Sep 2018 05:43) (125/83 - 130/82)  RR: 14 (07 Sep 2018 05:43) (14 - 14)  SpO2: 97% (07 Sep 2018 05:43) (96% - 97%)    PHYSICAL EXAM  General: NAD  Cardio: S1S2+  Resp: clear  Abdomen: soft, NT   Extrem: no edema, mild R > L calf tenderness   Skin: Bilateral knee incisions: no drainage. no redness, acquacel dressings removed     RECENT LABS    RADIOLOGY/OTHER RESULTS    MEDICATIONS  (STANDING):  atorvastatin 20 milliGRAM(s) Oral at bedtime  cholecalciferol 1000 Unit(s) Oral daily  docusate sodium 100 milliGRAM(s) Oral three times a day  enoxaparin Injectable 40 milliGRAM(s) SubCutaneous every 24 hours  influenza   Vaccine 0.5 milliLiter(s) IntraMuscular once  lidocaine   Patch 1 Patch Transdermal daily  losartan 25 milliGRAM(s) Oral daily  multivitamin 1 Tablet(s) Oral daily  pantoprazole    Tablet 40 milliGRAM(s) Oral before breakfast  polyethylene glycol 3350 17 Gram(s) Oral daily  senna 2 Tablet(s) Oral at bedtime    MEDICATIONS  (PRN):  acetaminophen   Tablet. 650 milliGRAM(s) Oral every 6 hours PRN Mild Pain (1 - 3)  aluminum hydroxide/magnesium hydroxide/simethicone Suspension 30 milliLiter(s) Oral every 6 hours PRN Dyspepsia  bisacodyl Suppository 10 milliGRAM(s) Rectal daily PRN Constipation  ondansetron    Tablet 4 milliGRAM(s) Oral every 8 hours PRN Nausea and/or Vomiting  oxyCODONE    IR 5 milliGRAM(s) Oral every 4 hours PRN Mild Pain (1 - 3)  oxyCODONE    IR 10 milliGRAM(s) Oral every 4 hours PRN Moderate Pain (4 - 6)  oxyCODONE    IR 15 milliGRAM(s) Oral every 4 hours PRN Severe Pain (7 - 10)    ASSESSMENT AND PLAN:  Ms. Bagley is 57 year old F s/p bilateral TKA on 8/28 presenting with functional, gait, ADL impairments     #S/p bilateral TKA   -Rehab: Comprehensive PT/OT for 3 hours per day, goals include mod I with stair negotiation, mod I with mobility, transfers and ADLs. Team meeting: overall Supervision level;  KATHRYN 9/8   -WBAT bilateral LEs  -DVT ppx: Lovenox 40mg subcutaneous daily for 14 days postop, then stop and start Aspirin 325mg PO BID for 4 weeks. Check LE venous doppler   -Pain control: Oxycodone prn, tylenol prn, celebrex 200 mg PO BID x 4 days   -Dressings to be removed 10 days post op (9/7)    #Left shoulder pain   -Add lidoderm patch     #HTN: Stable on losartan    #HLD  -atorvastatin    #Resp  -History of asthma, stable     #GI  -Bowel regimen: senna, colace, miralax, bisacodyl suppository prn   -PPx: Protonix  -Nutrition: Vit D, MVI

## 2018-09-07 NOTE — DISCHARGE NOTE ADULT - PATIENT PORTAL LINK FT
You can access the [facility] Patient Portal, offered by [facility], by registering with the following website: [email or web address]

## 2018-09-07 NOTE — PROGRESS NOTE ADULT - PROVIDER SPECIALTY LIST ADULT
Hospitalist
Rehab Medicine
Hospitalist

## 2018-09-07 NOTE — DISCHARGE NOTE ADULT - SECONDARY DIAGNOSIS.
Mitral valve disorder Gastroesophageal reflux disease without esophagitis Hypertension, unspecified type Other hyperlipidemia

## 2018-09-07 NOTE — DISCHARGE NOTE ADULT - PLAN OF CARE
Continue outpatient PT/OT -Take oxycodone or tylenol as needed for pain   -Continue lovenox injections daily until 9/11/18. On 9/12/18 start taking aspirin 325 mg twice/day for 28 days. Take Protonix 40 mg daily while on aspirin, then stop. Follow up with your family doctor Continue Protonix for one month Take losartan 25 mg daily. Follow up with your family doctor Take atorvastatin daily

## 2018-09-07 NOTE — PROGRESS NOTE ADULT - REASON FOR ADMISSION
Mobility limitations after Bilateral total knee replacement.

## 2018-09-07 NOTE — DISCHARGE NOTE ADULT - CARE PLAN
Principal Discharge DX:	Total knee replacement status, bilateral  Goal:	Continue outpatient PT/OT  Assessment and plan of treatment:	-Take oxycodone or tylenol as needed for pain   -Continue lovenox injections daily until 9/11/18. On 9/12/18 start taking aspirin 325 mg twice/day for 28 days. Take Protonix 40 mg daily while on aspirin, then stop.  Secondary Diagnosis:	Mitral valve disorder  Assessment and plan of treatment:	Follow up with your family doctor  Secondary Diagnosis:	Gastroesophageal reflux disease without esophagitis  Assessment and plan of treatment:	Continue Protonix for one month  Secondary Diagnosis:	Hypertension, unspecified type  Assessment and plan of treatment:	Take losartan 25 mg daily. Follow up with your family doctor  Secondary Diagnosis:	Other hyperlipidemia  Assessment and plan of treatment:	Take atorvastatin daily

## 2018-09-07 NOTE — DISCHARGE NOTE ADULT - MEDICATION SUMMARY - MEDICATIONS TO STOP TAKING
I will STOP taking the medications listed below when I get home from the hospital:    losartan-hydrochlorothiazide 50mg-12.5mg oral tablet  -- 1 tab(s) by mouth once a day    celecoxib 200 mg oral capsule  -- 1 cap(s) by mouth 2 times a day    pantoprazole 40 mg oral delayed release tablet  -- 1 tab(s) by mouth once a day

## 2018-09-07 NOTE — PROGRESS NOTE ADULT - ATTENDING COMMENTS
Pt. seen 9/6.  Agree with documentation above as per resident with amendments made as appropriate. Patient medically stable. Making progress towards rehab goals.   Pt. had LE calf tenderness--STAT LE doppler --neg.    approaching mod I in PT/OT--d/c planning 9/8.  Will need lovenox injections through 9/11--d/w clinical pharmacist.  Hospital will supply last few doses for after discharge.  Nursing education on injection self administration.
Pt. seen 9/6.  Agree with documentation above as per resident with amendments made as appropriate. Patient medically stable. Making progress towards rehab goals.   Supervision approaching mod I in PT/OT--d/c planning 9/8 or 9/9.  Will need lovenox injections through 9/11--d/w clinical pharmacist.  Hospital will supply last few doses for after discharge.  Nursing education on injection self administration.

## 2018-09-08 VITALS
SYSTOLIC BLOOD PRESSURE: 130 MMHG | HEART RATE: 90 BPM | DIASTOLIC BLOOD PRESSURE: 75 MMHG | RESPIRATION RATE: 14 BRPM | OXYGEN SATURATION: 98 % | TEMPERATURE: 98 F

## 2018-09-08 PROCEDURE — 97167 OT EVAL HIGH COMPLEX 60 MIN: CPT

## 2018-09-08 PROCEDURE — 93970 EXTREMITY STUDY: CPT

## 2018-09-08 PROCEDURE — 84134 ASSAY OF PREALBUMIN: CPT

## 2018-09-08 PROCEDURE — 97163 PT EVAL HIGH COMPLEX 45 MIN: CPT

## 2018-09-08 PROCEDURE — 97530 THERAPEUTIC ACTIVITIES: CPT

## 2018-09-08 PROCEDURE — 97116 GAIT TRAINING THERAPY: CPT

## 2018-09-08 PROCEDURE — 97110 THERAPEUTIC EXERCISES: CPT

## 2018-09-08 PROCEDURE — 85027 COMPLETE CBC AUTOMATED: CPT

## 2018-09-08 PROCEDURE — 80053 COMPREHEN METABOLIC PANEL: CPT

## 2018-09-08 PROCEDURE — 93005 ELECTROCARDIOGRAM TRACING: CPT

## 2018-09-08 PROCEDURE — 97535 SELF CARE MNGMENT TRAINING: CPT

## 2018-09-08 RX ORDER — OXYCODONE HYDROCHLORIDE 5 MG/1
10 TABLET ORAL EVERY 4 HOURS
Qty: 0 | Refills: 0 | Status: DISCONTINUED | OUTPATIENT
Start: 2018-09-08 | End: 2018-09-08

## 2018-09-08 RX ORDER — OXYCODONE HYDROCHLORIDE 5 MG/1
15 TABLET ORAL EVERY 4 HOURS
Qty: 0 | Refills: 0 | Status: DISCONTINUED | OUTPATIENT
Start: 2018-09-08 | End: 2018-09-08

## 2018-09-08 RX ADMIN — Medication 1000 UNIT(S): at 11:28

## 2018-09-08 RX ADMIN — OXYCODONE HYDROCHLORIDE 10 MILLIGRAM(S): 5 TABLET ORAL at 08:30

## 2018-09-08 RX ADMIN — Medication 650 MILLIGRAM(S): at 11:34

## 2018-09-08 RX ADMIN — Medication 650 MILLIGRAM(S): at 08:09

## 2018-09-08 RX ADMIN — OXYCODONE HYDROCHLORIDE 10 MILLIGRAM(S): 5 TABLET ORAL at 06:46

## 2018-09-08 RX ADMIN — Medication 1 TABLET(S): at 11:28

## 2018-09-08 RX ADMIN — OXYCODONE HYDROCHLORIDE 10 MILLIGRAM(S): 5 TABLET ORAL at 07:42

## 2018-09-08 RX ADMIN — LIDOCAINE 1 PATCH: 4 CREAM TOPICAL at 01:55

## 2018-09-08 RX ADMIN — Medication 650 MILLIGRAM(S): at 11:36

## 2018-09-08 RX ADMIN — OXYCODONE HYDROCHLORIDE 10 MILLIGRAM(S): 5 TABLET ORAL at 10:22

## 2018-09-08 RX ADMIN — Medication 100 MILLIGRAM(S): at 06:28

## 2018-09-08 RX ADMIN — OXYCODONE HYDROCHLORIDE 15 MILLIGRAM(S): 5 TABLET ORAL at 02:30

## 2018-09-08 RX ADMIN — OXYCODONE HYDROCHLORIDE 10 MILLIGRAM(S): 5 TABLET ORAL at 10:10

## 2018-09-08 RX ADMIN — OXYCODONE HYDROCHLORIDE 15 MILLIGRAM(S): 5 TABLET ORAL at 08:31

## 2018-09-08 RX ADMIN — PANTOPRAZOLE SODIUM 40 MILLIGRAM(S): 20 TABLET, DELAYED RELEASE ORAL at 06:28

## 2018-09-08 RX ADMIN — OXYCODONE HYDROCHLORIDE 15 MILLIGRAM(S): 5 TABLET ORAL at 01:43

## 2018-09-08 RX ADMIN — LOSARTAN POTASSIUM 25 MILLIGRAM(S): 100 TABLET, FILM COATED ORAL at 06:28

## 2018-09-08 RX ADMIN — LIDOCAINE 1 PATCH: 4 CREAM TOPICAL at 11:28

## 2018-09-11 ENCOUNTER — INBOUND DOCUMENT (OUTPATIENT)
Age: 58
End: 2018-09-11

## 2018-09-14 RX ORDER — CYCLOBENZAPRINE HYDROCHLORIDE 5 MG/1
5 TABLET, FILM COATED ORAL 3 TIMES DAILY
Qty: 15 | Refills: 0 | Status: DISCONTINUED | COMMUNITY
Start: 2018-09-14 | End: 2018-09-14

## 2018-09-17 ENCOUNTER — APPOINTMENT (OUTPATIENT)
Dept: ORTHOPEDIC SURGERY | Facility: CLINIC | Age: 58
End: 2018-09-17
Payer: COMMERCIAL

## 2018-09-17 PROCEDURE — 99024 POSTOP FOLLOW-UP VISIT: CPT

## 2018-09-18 DIAGNOSIS — J45.20 MILD INTERMITTENT ASTHMA, UNCOMPLICATED: ICD-10-CM

## 2018-09-18 DIAGNOSIS — I08.1 RHEUMATIC DISORDERS OF BOTH MITRAL AND TRICUSPID VALVES: ICD-10-CM

## 2018-09-18 DIAGNOSIS — I10 ESSENTIAL (PRIMARY) HYPERTENSION: ICD-10-CM

## 2018-09-18 DIAGNOSIS — I37.9 NONRHEUMATIC PULMONARY VALVE DISORDER, UNSPECIFIED: ICD-10-CM

## 2018-09-18 DIAGNOSIS — R26.9 UNSPECIFIED ABNORMALITIES OF GAIT AND MOBILITY: ICD-10-CM

## 2018-09-18 DIAGNOSIS — Z85.43 PERSONAL HISTORY OF MALIGNANT NEOPLASM OF OVARY: ICD-10-CM

## 2018-09-18 DIAGNOSIS — K21.9 GASTRO-ESOPHAGEAL REFLUX DISEASE WITHOUT ESOPHAGITIS: ICD-10-CM

## 2018-09-18 DIAGNOSIS — Z79.82 LONG TERM (CURRENT) USE OF ASPIRIN: ICD-10-CM

## 2018-09-18 DIAGNOSIS — I95.9 HYPOTENSION, UNSPECIFIED: ICD-10-CM

## 2018-09-18 DIAGNOSIS — Z47.1 AFTERCARE FOLLOWING JOINT REPLACEMENT SURGERY: ICD-10-CM

## 2018-09-18 DIAGNOSIS — Z96.653 PRESENCE OF ARTIFICIAL KNEE JOINT, BILATERAL: ICD-10-CM

## 2018-09-18 DIAGNOSIS — Z51.89 ENCOUNTER FOR OTHER SPECIFIED AFTERCARE: ICD-10-CM

## 2018-09-18 DIAGNOSIS — E78.5 HYPERLIPIDEMIA, UNSPECIFIED: ICD-10-CM

## 2018-10-15 ENCOUNTER — APPOINTMENT (OUTPATIENT)
Dept: ORTHOPEDIC SURGERY | Facility: CLINIC | Age: 58
End: 2018-10-15
Payer: COMMERCIAL

## 2018-10-15 ENCOUNTER — RX RENEWAL (OUTPATIENT)
Age: 58
End: 2018-10-15

## 2018-10-15 PROCEDURE — 73562 X-RAY EXAM OF KNEE 3: CPT | Mod: 50

## 2018-10-15 PROCEDURE — 99024 POSTOP FOLLOW-UP VISIT: CPT

## 2018-11-19 ENCOUNTER — APPOINTMENT (OUTPATIENT)
Dept: ORTHOPEDIC SURGERY | Facility: CLINIC | Age: 58
End: 2018-11-19
Payer: COMMERCIAL

## 2018-11-19 VITALS
WEIGHT: 130 LBS | DIASTOLIC BLOOD PRESSURE: 86 MMHG | BODY MASS INDEX: 26.21 KG/M2 | HEART RATE: 41 BPM | HEIGHT: 59 IN | SYSTOLIC BLOOD PRESSURE: 128 MMHG | OXYGEN SATURATION: 98 %

## 2018-11-19 PROCEDURE — 99024 POSTOP FOLLOW-UP VISIT: CPT

## 2018-11-19 PROCEDURE — 73562 X-RAY EXAM OF KNEE 3: CPT | Mod: 50

## 2018-11-26 ENCOUNTER — FORM ENCOUNTER (OUTPATIENT)
Age: 58
End: 2018-11-26

## 2019-02-05 ENCOUNTER — NON-APPOINTMENT (OUTPATIENT)
Age: 59
End: 2019-02-05

## 2019-02-05 ENCOUNTER — APPOINTMENT (OUTPATIENT)
Dept: PULMONOLOGY | Facility: CLINIC | Age: 59
End: 2019-02-05
Payer: COMMERCIAL

## 2019-02-05 VITALS
OXYGEN SATURATION: 96 % | HEART RATE: 106 BPM | DIASTOLIC BLOOD PRESSURE: 85 MMHG | BODY MASS INDEX: 26.21 KG/M2 | HEIGHT: 59 IN | SYSTOLIC BLOOD PRESSURE: 134 MMHG | RESPIRATION RATE: 14 BRPM | WEIGHT: 130 LBS

## 2019-02-05 DIAGNOSIS — R06.83 SNORING: ICD-10-CM

## 2019-02-05 DIAGNOSIS — I25.10 ATHEROSCLEROTIC HEART DISEASE OF NATIVE CORONARY ARTERY W/OUT ANGINA PECTORIS: ICD-10-CM

## 2019-02-05 PROCEDURE — 94060 EVALUATION OF WHEEZING: CPT

## 2019-02-05 PROCEDURE — 99204 OFFICE O/P NEW MOD 45 MIN: CPT | Mod: 25

## 2019-02-05 NOTE — PHYSICAL EXAM
[General Appearance - Well Developed] : well developed [Normal Appearance] : normal appearance [Well Groomed] : well groomed [General Appearance - Well Nourished] : well nourished [No Deformities] : no deformities [General Appearance - In No Acute Distress] : no acute distress [Normal Conjunctiva] : the conjunctiva exhibited no abnormalities [Eyelids - No Xanthelasma] : the eyelids demonstrated no xanthelasmas [Normal Oropharynx] : normal oropharynx [III] : III [Neck Appearance] : the appearance of the neck was normal [Jugular Venous Distention Increased] : there was no jugular-venous distention [Heart Sounds] : normal S1 and S2 [Edema] : no peripheral edema present [Respiration, Rhythm And Depth] : normal respiratory rhythm and effort [Exaggerated Use Of Accessory Muscles For Inspiration] : no accessory muscle use [Auscultation Breath Sounds / Voice Sounds] : lungs were clear to auscultation bilaterally [Bowel Sounds] : normal bowel sounds [Abdomen Soft] : soft [Abnormal Walk] : normal gait [Nail Clubbing] : no clubbing of the fingernails [Cyanosis, Localized] : no localized cyanosis [] : no rash [No Focal Deficits] : no focal deficits [Oriented To Time, Place, And Person] : oriented to person, place, and time [Impaired Insight] : insight and judgment were intact [Affect] : the affect was normal [Mood] : the mood was normal

## 2019-02-05 NOTE — REASON FOR VISIT
[Consultation] : a consultation visit [Asthma] : asthma [Shortness of Breath] : shortness of Breath [Sleep Apnea] : sleep apnea [Family Member] : family member [FreeTextEntry1] : Patient is referred by Dr Wilkerson for a pulmonary consult

## 2019-02-05 NOTE — ASSESSMENT
[FreeTextEntry1] : Summary the patient is a 58-year-old nonsmoking female who presents for pulmonary consult. Patient's physical exam is within normal limits. Patient underwent spirometry which revealed mild restrictive lung disease. I am concerned that the patient does have obstructive sleep apnea but I think her shortness of breath is secondary to worsening coronary artery disease and I have therefore referred her to cardiology for further management

## 2019-02-05 NOTE — CONSULT LETTER
[Dear  ___] : Dear  [unfilled], [Consult Letter:] : I had the pleasure of evaluating your patient, [unfilled]. [Please see my note below.] : Please see my note below. [Consult Closing:] : Thank you very much for allowing me to participate in the care of this patient.  If you have any questions, please do not hesitate to contact me. [Sincerely,] : Sincerely, [FreeTextEntry2] : Dr Wilkerson [FreeTextEntry3] : IDA Cuevas MD

## 2019-02-05 NOTE — HISTORY OF PRESENT ILLNESS
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 The patient presents complaining of worsening shortness of breath and dyspnea on exertion. The patient has never smoked. She denies fevers chills chest pain weight loss or hemoptysis. The patient states that she walks 20-30 feet she becomes winded. She also states that she had a recent dobutamine stress test which revealed three-vessel disease

## 2019-02-05 NOTE — REVIEW OF SYSTEMS
[Cough] : cough [Hemoptysis] : no hemoptysis [Dyspnea] : dyspnea [Chest Tightness] : no chest tightness [Pleuritic Pain] : no pleuritic pain [Frequent URIs] : no frequent upper respiratory infections [Wheezing] : no wheezing [Snoring] : snoring [Witnessed Apneas] : demonstrated ~M apnea [Nonrestorative Sleep] : nonrestorative sleep [Awakes With Headache] : awakes with a headache [Awakes With Dry Mouth] : awakes with dry mouth [Negative] : Pulmonary Hypertension

## 2019-02-07 ENCOUNTER — NON-APPOINTMENT (OUTPATIENT)
Age: 59
End: 2019-02-07

## 2019-02-07 ENCOUNTER — APPOINTMENT (OUTPATIENT)
Dept: HEART AND VASCULAR | Facility: CLINIC | Age: 59
End: 2019-02-07
Payer: COMMERCIAL

## 2019-02-07 VITALS
SYSTOLIC BLOOD PRESSURE: 155 MMHG | DIASTOLIC BLOOD PRESSURE: 85 MMHG | WEIGHT: 131 LBS | BODY MASS INDEX: 26.41 KG/M2 | OXYGEN SATURATION: 94 % | HEIGHT: 59 IN | RESPIRATION RATE: 14 BRPM | HEART RATE: 99 BPM

## 2019-02-07 PROCEDURE — 99205 OFFICE O/P NEW HI 60 MIN: CPT

## 2019-02-07 NOTE — PHYSICAL EXAM
[General Appearance - Well Developed] : well developed [Normal Appearance] : normal appearance [Well Groomed] : well groomed [General Appearance - Well Nourished] : well nourished [No Deformities] : no deformities [General Appearance - In No Acute Distress] : no acute distress [Heart Rate And Rhythm] : heart rate and rhythm were normal [Heart Sounds] : normal S1 and S2 [Murmurs] : no murmurs present [Respiration, Rhythm And Depth] : normal respiratory rhythm and effort [Exaggerated Use Of Accessory Muscles For Inspiration] : no accessory muscle use [Auscultation Breath Sounds / Voice Sounds] : lungs were clear to auscultation bilaterally [Abdomen Soft] : soft [Abdomen Tenderness] : non-tender [Abdomen Mass (___ Cm)] : no abdominal mass palpated [Nail Clubbing] : no clubbing of the fingernails [Cyanosis, Localized] : no localized cyanosis [Petechial Hemorrhages (___cm)] : no petechial hemorrhages [] : no ischemic changes

## 2019-02-07 NOTE — HISTORY OF PRESENT ILLNESS
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 \par The patient reports worsening shortness of breath on exertion since her knee surgere 4 months ago. \par She denies chest pain, palpitations, syncope or pre-syncope. \par Denies LE edema or  orthopnea.\par As per report the patient had  a stress test prior to her surgery (report not available). \par \par PMH\par HTN\par HLD\par \par PSH\par knee sgy x 2\par shoulder sgyx2\par ovarian cancer sgy\par \par ALL\par NKDA\par \par SH\par no smoke, no etoh, no drugs\par \par

## 2019-02-07 NOTE — DISCUSSION/SUMMARY
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 \par \par ANGULO\par -will obtain echo to r/o any structural abnormality and r/o pHTN as possible cardiac etiologies of the patient's symptoms \par -recommended to obtain official result of the stress test performed recently (prior to surgery) and to obtain recent labs\par \par HTN\par -well controlled \par -cont with current medications\par \par HLD\par -obtain recent labs from PMD\par -if no labs available would repeat lipid panel \par -cont atorvastatin \par \par f/u in 2 weeks for echo results

## 2019-02-12 ENCOUNTER — APPOINTMENT (OUTPATIENT)
Dept: HEART AND VASCULAR | Facility: CLINIC | Age: 59
End: 2019-02-12
Payer: COMMERCIAL

## 2019-02-12 ENCOUNTER — RESULT CHARGE (OUTPATIENT)
Age: 59
End: 2019-02-12

## 2019-02-12 PROCEDURE — 93306 TTE W/DOPPLER COMPLETE: CPT

## 2019-02-21 ENCOUNTER — APPOINTMENT (OUTPATIENT)
Dept: HEART AND VASCULAR | Facility: CLINIC | Age: 59
End: 2019-02-21
Payer: COMMERCIAL

## 2019-02-21 VITALS
OXYGEN SATURATION: 95 % | HEIGHT: 59 IN | HEART RATE: 97 BPM | RESPIRATION RATE: 12 BRPM | BODY MASS INDEX: 27.21 KG/M2 | SYSTOLIC BLOOD PRESSURE: 144 MMHG | WEIGHT: 135 LBS | DIASTOLIC BLOOD PRESSURE: 87 MMHG

## 2019-02-21 VITALS — SYSTOLIC BLOOD PRESSURE: 138 MMHG | DIASTOLIC BLOOD PRESSURE: 78 MMHG

## 2019-02-21 PROCEDURE — 99215 OFFICE O/P EST HI 40 MIN: CPT

## 2019-02-21 NOTE — HISTORY OF PRESENT ILLNESS
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 \par The patient reports worsening shortness of breath on exertion since her knee surgery 4 months ago. \par She denies chest pain, palpitations, syncope or pre-syncope. \par Denies LE edema or  orthopnea.\par Today she reports that her symptoms are still present and limit her exercise tolerance. She reports improvement of her ANGULO after the use of asthma inhalers.. \par \par \par PMH\par HTN\par HLD\par \par PSH\par knee sgy x 2\par shoulder sgyx2\par ovarian cancer sgy\par \par ALL\par NKDA\par \par SH\par no smoke, no etoh, no drugs\par \par

## 2019-02-21 NOTE — DISCUSSION/SUMMARY
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 \par \par ANGULO\par -echo on preliminary report is normal without evidence of structural abnormalities or pHTN\par -nuclear stress test also did not reveal gross abnormalities (small predominantly fixed septal defect without anginal symptoms and normal EF)\par -in the setting of abnormal PFTs, and patient's description of  symptoms improving after inhaler use, suspect pulmonary etiology is main contributor to the patient's symptoms. \par -recc f/u official echo report\par -cont to f/u with pulm\par \par \par HTN\par -borderline \par -cont with current medications (losartan/hctz 50-12.5 mg daily)\par \par HLD\par -cont atorvastatin \par -lipid panel at next visit\par \par \par f/u in 4 weeks for BP check and symptoms assessment

## 2019-02-21 NOTE — PHYSICAL EXAM
[General Appearance - Well Developed] : well developed [Normal Appearance] : normal appearance [Well Groomed] : well groomed [General Appearance - Well Nourished] : well nourished [No Deformities] : no deformities [General Appearance - In No Acute Distress] : no acute distress [Respiration, Rhythm And Depth] : normal respiratory rhythm and effort [Exaggerated Use Of Accessory Muscles For Inspiration] : no accessory muscle use [Auscultation Breath Sounds / Voice Sounds] : lungs were clear to auscultation bilaterally [Heart Rate And Rhythm] : heart rate and rhythm were normal [Heart Sounds] : normal S1 and S2 [Murmurs] : no murmurs present [Abdomen Soft] : soft [Abdomen Tenderness] : non-tender [Abdomen Mass (___ Cm)] : no abdominal mass palpated [Nail Clubbing] : no clubbing of the fingernails [Cyanosis, Localized] : no localized cyanosis [Petechial Hemorrhages (___cm)] : no petechial hemorrhages [] : no ischemic changes

## 2019-02-25 ENCOUNTER — APPOINTMENT (OUTPATIENT)
Dept: ORTHOPEDIC SURGERY | Facility: CLINIC | Age: 59
End: 2019-02-25
Payer: COMMERCIAL

## 2019-02-25 VITALS — WEIGHT: 135 LBS | BODY MASS INDEX: 27.21 KG/M2 | HEIGHT: 59 IN

## 2019-02-25 PROCEDURE — 73562 X-RAY EXAM OF KNEE 3: CPT | Mod: 50

## 2019-02-25 PROCEDURE — 99213 OFFICE O/P EST LOW 20 MIN: CPT

## 2019-02-25 NOTE — HISTORY OF PRESENT ILLNESS
[de-identified] : 57 y/o female presents for follow up evaluation s/p bilateral TKR at 6 months. Patient is doing well and has graduated from PT, and is now doing home exercise with the elliptical. She denies any major pains or symptoms aside from mild pain with stairs or prolonged walked. Patient is overall very happy with her outcome. Of note, she was recently diagnosed with restrictive lung disease and will be following up with her pulmonologist. She would also like a note to return to work tomorrow.

## 2019-02-25 NOTE — DISCUSSION/SUMMARY
[de-identified] : Patient is doing well following their bilateral TKR at 6 months. They will continue activities as tolerated. Patient will follow up with x-rays in 6 months.

## 2019-02-25 NOTE — ADDENDUM
[FreeTextEntry1] : This note was written by Anne Hoskins on 02/25/2019 acting as scribe for Dr. Miguel Gardner M.D.\par \par I, Dr. Miguel Gardner M.D., have read and attest that all the information, medical decision making and discharge instructions within are true and accurate.\par

## 2019-03-19 ENCOUNTER — APPOINTMENT (OUTPATIENT)
Dept: PULMONOLOGY | Facility: CLINIC | Age: 59
End: 2019-03-19
Payer: COMMERCIAL

## 2019-03-19 ENCOUNTER — APPOINTMENT (OUTPATIENT)
Dept: HEART AND VASCULAR | Facility: CLINIC | Age: 59
End: 2019-03-19

## 2019-03-19 VITALS
SYSTOLIC BLOOD PRESSURE: 120 MMHG | BODY MASS INDEX: 27.82 KG/M2 | WEIGHT: 138 LBS | HEART RATE: 96 BPM | RESPIRATION RATE: 14 BRPM | HEIGHT: 59 IN | OXYGEN SATURATION: 96 % | DIASTOLIC BLOOD PRESSURE: 81 MMHG

## 2019-03-19 DIAGNOSIS — Z86.39 PERSONAL HISTORY OF OTHER ENDOCRINE, NUTRITIONAL AND METABOLIC DISEASE: ICD-10-CM

## 2019-03-19 DIAGNOSIS — Z87.09 PERSONAL HISTORY OF OTHER DISEASES OF THE RESPIRATORY SYSTEM: ICD-10-CM

## 2019-03-19 PROCEDURE — ZZZZZ: CPT

## 2019-03-19 PROCEDURE — 99214 OFFICE O/P EST MOD 30 MIN: CPT | Mod: 25

## 2019-03-19 PROCEDURE — 94726 PLETHYSMOGRAPHY LUNG VOLUMES: CPT

## 2019-03-19 PROCEDURE — 94060 EVALUATION OF WHEEZING: CPT

## 2019-03-19 PROCEDURE — 94729 DIFFUSING CAPACITY: CPT

## 2019-03-19 NOTE — REASON FOR VISIT
[Follow-Up] : a follow-up visit [Asthma] : asthma [Shortness of Breath] : shortness of Breath [Sleep Apnea] : sleep apnea [Family Member] : family member

## 2019-03-19 NOTE — PHYSICAL EXAM
[General Appearance - Well Developed] : well developed [Normal Appearance] : normal appearance [Well Groomed] : well groomed [General Appearance - Well Nourished] : well nourished [No Deformities] : no deformities [General Appearance - In No Acute Distress] : no acute distress [Normal Conjunctiva] : the conjunctiva exhibited no abnormalities [Eyelids - No Xanthelasma] : the eyelids demonstrated no xanthelasmas [Normal Oropharynx] : normal oropharynx [III] : III [Neck Appearance] : the appearance of the neck was normal [Jugular Venous Distention Increased] : there was no jugular-venous distention [Edema] : no peripheral edema present [Heart Sounds] : normal S1 and S2 [Respiration, Rhythm And Depth] : normal respiratory rhythm and effort [Exaggerated Use Of Accessory Muscles For Inspiration] : no accessory muscle use [Auscultation Breath Sounds / Voice Sounds] : lungs were clear to auscultation bilaterally [Bowel Sounds] : normal bowel sounds [Abnormal Walk] : normal gait [Abdomen Soft] : soft [Nail Clubbing] : no clubbing of the fingernails [Cyanosis, Localized] : no localized cyanosis [] : no rash [Oriented To Time, Place, And Person] : oriented to person, place, and time [No Focal Deficits] : no focal deficits [Affect] : the affect was normal [Impaired Insight] : insight and judgment were intact [Mood] : the mood was normal

## 2019-03-19 NOTE — HISTORY OF PRESENT ILLNESS
[FreeTextEntry1] : Patient is a 58-year-old female with past medical history significant for asthma, recently diagnosed with obstructive sleep apnea and started on CPAP who presents today for followup. The patient states that her shortness of breath and dyspnea on exertion have improved significantly with her increased compliance with her CPAP machine. She denies fevers chills chest pain weight loss or hemoptysis

## 2019-03-19 NOTE — ASSESSMENT
[FreeTextEntry1] : In summary the patient is a 58-year-old female with past medical history significant for asthma and obstructive sleep apnea who presents for followup. Patient's physical exam is significant for improved air entry bilaterally.\par \par The patient underwent a pulmonary function test which revealed severe restrictive lung disease. Patient is instructed to continue current medications and to follow up in 3 months

## 2019-04-16 ENCOUNTER — APPOINTMENT (OUTPATIENT)
Dept: PULMONOLOGY | Facility: CLINIC | Age: 59
End: 2019-04-16
Payer: COMMERCIAL

## 2019-04-16 VITALS
HEIGHT: 59 IN | WEIGHT: 136 LBS | SYSTOLIC BLOOD PRESSURE: 145 MMHG | RESPIRATION RATE: 12 BRPM | HEART RATE: 82 BPM | DIASTOLIC BLOOD PRESSURE: 84 MMHG | OXYGEN SATURATION: 95 % | BODY MASS INDEX: 27.42 KG/M2

## 2019-04-16 DIAGNOSIS — Z86.79 PERSONAL HISTORY OF OTHER DISEASES OF THE CIRCULATORY SYSTEM: ICD-10-CM

## 2019-04-16 DIAGNOSIS — R06.02 SHORTNESS OF BREATH: ICD-10-CM

## 2019-04-16 DIAGNOSIS — Z82.61 FAMILY HISTORY OF ARTHRITIS: ICD-10-CM

## 2019-04-16 DIAGNOSIS — G47.33 OBSTRUCTIVE SLEEP APNEA (ADULT) (PEDIATRIC): ICD-10-CM

## 2019-04-16 PROCEDURE — 99214 OFFICE O/P EST MOD 30 MIN: CPT | Mod: 25

## 2019-04-16 PROCEDURE — 96372 THER/PROPH/DIAG INJ SC/IM: CPT

## 2019-04-16 NOTE — PHYSICAL EXAM
[General Appearance - Well Developed] : well developed [Normal Appearance] : normal appearance [General Appearance - Well Nourished] : well nourished [Well Groomed] : well groomed [No Deformities] : no deformities [General Appearance - In No Acute Distress] : no acute distress [Normal Conjunctiva] : the conjunctiva exhibited no abnormalities [Eyelids - No Xanthelasma] : the eyelids demonstrated no xanthelasmas [III] : III [Normal Oropharynx] : normal oropharynx [Neck Appearance] : the appearance of the neck was normal [Edema] : no peripheral edema present [Jugular Venous Distention Increased] : there was no jugular-venous distention [Heart Sounds] : normal S1 and S2 [Respiration, Rhythm And Depth] : normal respiratory rhythm and effort [Exaggerated Use Of Accessory Muscles For Inspiration] : no accessory muscle use [Scattered Wheezes] : scattered wheezing [Abdomen Soft] : soft [Bowel Sounds] : normal bowel sounds [Cyanosis, Localized] : no localized cyanosis [Nail Clubbing] : no clubbing of the fingernails [Abnormal Walk] : normal gait [] : no rash [Oriented To Time, Place, And Person] : oriented to person, place, and time [No Focal Deficits] : no focal deficits [Impaired Insight] : insight and judgment were intact [Affect] : the affect was normal [Mood] : the mood was normal

## 2019-04-16 NOTE — REVIEW OF SYSTEMS
[Cough] : cough [Hemoptysis] : no hemoptysis [Sputum] : sputum  [Chest Tightness] : no chest tightness [Dyspnea] : dyspnea [Frequent URIs] : no frequent upper respiratory infections [Pleuritic Pain] : no pleuritic pain [Wheezing] : wheezing [Negative] : Sleep Disorder

## 2019-04-16 NOTE — REASON FOR VISIT
[Acute] : an acute visit [Asthma] : asthma [Shortness of Breath] : shortness of Breath [Cough] : cough [Wheezing] : wheezing

## 2019-04-16 NOTE — ASSESSMENT
[FreeTextEntry1] : In summary the patient is a 58-year-old female with past medical history significant for asthma and obstructive sleep apnea who presents for an acute visit. Patient's physical exam is significant for scattered rhonchi  bilaterally.\par \par The patient was treated with Solu-Medrol and 25 mg IV push. Prescription for Zithromax has been given and patient is instructed to followup in the next two weeks

## 2019-04-16 NOTE — HISTORY OF PRESENT ILLNESS
[FreeTextEntry1] : Patient is a 58-year-old female with past medical history significant for asthma, recently diagnosed with obstructive sleep apnea and started on CPAP who presents today for an acute visit. Patient presents today complaining of worsening cough sputum production shortness of breath over the last 3 days not relieved with her rescue medication

## 2019-04-26 NOTE — PATIENT PROFILE ADULT. - TOBACCO USE
Patient calls back. She spoke with Pat and they are going to send her something from [facility]. She is going to try to send it through [facility].    Also told patient I spoke with pharmacy and she did not  meds. She states she didn't pick anything up because she was waiting to hear from Dr Dunn about the cough medication.         Never smoker

## 2019-06-11 ENCOUNTER — APPOINTMENT (OUTPATIENT)
Dept: PULMONOLOGY | Facility: CLINIC | Age: 59
End: 2019-06-11

## 2019-07-31 ENCOUNTER — APPOINTMENT (OUTPATIENT)
Dept: MAMMOGRAPHY | Facility: HOSPITAL | Age: 59
End: 2019-07-31
Payer: COMMERCIAL

## 2019-07-31 ENCOUNTER — APPOINTMENT (OUTPATIENT)
Dept: ULTRASOUND IMAGING | Facility: HOSPITAL | Age: 59
End: 2019-07-31
Payer: COMMERCIAL

## 2019-07-31 ENCOUNTER — OUTPATIENT (OUTPATIENT)
Dept: OUTPATIENT SERVICES | Facility: HOSPITAL | Age: 59
LOS: 1 days | End: 2019-07-31
Payer: COMMERCIAL

## 2019-07-31 DIAGNOSIS — Z98.890 OTHER SPECIFIED POSTPROCEDURAL STATES: Chronic | ICD-10-CM

## 2019-07-31 DIAGNOSIS — Z41.9 ENCOUNTER FOR PROCEDURE FOR PURPOSES OTHER THAN REMEDYING HEALTH STATE, UNSPECIFIED: Chronic | ICD-10-CM

## 2019-07-31 PROCEDURE — 77063 BREAST TOMOSYNTHESIS BI: CPT

## 2019-07-31 PROCEDURE — 76641 ULTRASOUND BREAST COMPLETE: CPT | Mod: 26,50

## 2019-07-31 PROCEDURE — 77067 SCR MAMMO BI INCL CAD: CPT | Mod: 26

## 2019-07-31 PROCEDURE — 77063 BREAST TOMOSYNTHESIS BI: CPT | Mod: 26

## 2019-07-31 PROCEDURE — 77067 SCR MAMMO BI INCL CAD: CPT

## 2019-07-31 PROCEDURE — 76641 ULTRASOUND BREAST COMPLETE: CPT

## 2019-08-26 ENCOUNTER — APPOINTMENT (OUTPATIENT)
Dept: ORTHOPEDIC SURGERY | Facility: CLINIC | Age: 59
End: 2019-08-26
Payer: COMMERCIAL

## 2019-08-26 VITALS — BODY MASS INDEX: 27.42 KG/M2 | HEIGHT: 59 IN | WEIGHT: 136 LBS

## 2019-08-26 DIAGNOSIS — Z96.653 PRESENCE OF ARTIFICIAL KNEE JOINT, BILATERAL: ICD-10-CM

## 2019-08-26 DIAGNOSIS — M17.0 BILATERAL PRIMARY OSTEOARTHRITIS OF KNEE: ICD-10-CM

## 2019-08-26 PROCEDURE — 73562 X-RAY EXAM OF KNEE 3: CPT | Mod: 50

## 2019-08-26 PROCEDURE — 99213 OFFICE O/P EST LOW 20 MIN: CPT

## 2019-08-26 NOTE — HISTORY OF PRESENT ILLNESS
[de-identified] : 59 y/o female presents for follow up evaluation s/p bilateral TKR at 1 year.  Patient is doing well and is very happy with her knees. 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 She is here for an anniversary check.

## 2019-08-26 NOTE — ADDENDUM
[FreeTextEntry1] : This note was written by Anne Hoskins on 08/26/2019 acting as scribe for Dr. Miguel Gardner M.D.\par \par I, Dr. Miguel Gardner M.D., have read and attest that all the information, medical decision making and discharge instructions within are true and accurate.\par

## 2019-08-26 NOTE — PHYSICAL EXAM
[de-identified] : Left Knee\par Inspection: no effusion\par Wounds: healed midline incision\par Alignment: normal.\par Palpation: no specific tenderness on palpation.\par ROM: Active (in degrees) 0-130\par Ligamentous laxity (neg): negative ant. drawer test, negative post. drawer test, stable to varus stress test, stable to valgus stress test,\par Patellofemoral Alignment Test: Q angle-, normal.\par Muscle Test: good quad strength.\par Leg examination: calf is soft and non-tender.\par \par Right Knee\par Inspection: no effusion\par Wounds: healed midline incision\par Alignment: normal.\par Palpation: no specific tenderness on palpation.\par ROM: Active (in degrees) 0-130\par Ligamentous laxity (neg): negative ant. drawer test, negative post. drawer test, stable to varus stress test, stable to valgus stress test,\par Patellofemoral Alignment Test: Q angle-, normal.\par Muscle Test: good quad strength.\par Leg examination: calf is soft and non-tender.  [de-identified] : Left knee xrays,  AP, lateral, merchant view,  taken at the office today demonstrates a total knee replacement in satisfactory position and alignment. No evidence of loosening. The patella sits in a central position\par \par Right knee xrays,  AP, lateral, merchant view, taken at the office today demonstrates a total knee replacement in satisfactory position and alignment. No evidence of loosening. The patella sits in a central position

## 2019-10-29 ENCOUNTER — MEDICATION RENEWAL (OUTPATIENT)
Age: 59
End: 2019-10-29

## 2019-10-29 RX ORDER — IPRATROPIUM BROMIDE AND ALBUTEROL SULFATE 2.5; .5 MG/3ML; MG/3ML
0.5-2.5 (3) SOLUTION RESPIRATORY (INHALATION)
Qty: 1 | Refills: 3 | Status: ACTIVE | COMMUNITY
Start: 2019-03-19 | End: 1900-01-01

## 2019-12-05 ENCOUNTER — APPOINTMENT (OUTPATIENT)
Dept: PULMONOLOGY | Facility: CLINIC | Age: 59
End: 2019-12-05

## 2019-12-10 ENCOUNTER — APPOINTMENT (OUTPATIENT)
Dept: PULMONOLOGY | Facility: CLINIC | Age: 59
End: 2019-12-10
Payer: COMMERCIAL

## 2019-12-10 VITALS
DIASTOLIC BLOOD PRESSURE: 86 MMHG | WEIGHT: 128 LBS | TEMPERATURE: 97.9 F | HEIGHT: 59 IN | OXYGEN SATURATION: 93 % | SYSTOLIC BLOOD PRESSURE: 130 MMHG | HEART RATE: 93 BPM | BODY MASS INDEX: 25.8 KG/M2

## 2019-12-10 DIAGNOSIS — J45.909 UNSPECIFIED ASTHMA, UNCOMPLICATED: ICD-10-CM

## 2019-12-10 DIAGNOSIS — Z99.89 OBSTRUCTIVE SLEEP APNEA (ADULT) (PEDIATRIC): ICD-10-CM

## 2019-12-10 DIAGNOSIS — J98.11 ATELECTASIS: ICD-10-CM

## 2019-12-10 DIAGNOSIS — R06.09 OTHER FORMS OF DYSPNEA: ICD-10-CM

## 2019-12-10 DIAGNOSIS — G47.33 OBSTRUCTIVE SLEEP APNEA (ADULT) (PEDIATRIC): ICD-10-CM

## 2019-12-10 PROCEDURE — 99204 OFFICE O/P NEW MOD 45 MIN: CPT

## 2019-12-10 PROCEDURE — 99214 OFFICE O/P EST MOD 30 MIN: CPT

## 2019-12-10 RX ORDER — BUDESONIDE AND FORMOTEROL FUMARATE DIHYDRATE 160; 4.5 UG/1; UG/1
160-4.5 AEROSOL RESPIRATORY (INHALATION) TWICE DAILY
Qty: 1 | Refills: 3 | Status: ACTIVE | COMMUNITY
Start: 2019-12-10 | End: 1900-01-01

## 2019-12-10 RX ORDER — MONTELUKAST SODIUM 10 MG/1
10 TABLET, FILM COATED ORAL DAILY
Qty: 90 | Refills: 3 | Status: ACTIVE | COMMUNITY
Start: 2019-12-10

## 2019-12-10 NOTE — PHYSICAL EXAM
[General Appearance - Well Developed] : well developed [Well Groomed] : well groomed [Normal Appearance] : normal appearance [No Deformities] : no deformities [General Appearance - Well Nourished] : well nourished [General Appearance - In No Acute Distress] : no acute distress [Normal Conjunctiva] : the conjunctiva exhibited no abnormalities [Eyelids - No Xanthelasma] : the eyelids demonstrated no xanthelasmas [Normal Oropharynx] : normal oropharynx [Neck Appearance] : the appearance of the neck was normal [Jugular Venous Distention Increased] : there was no jugular-venous distention [Neck Cervical Mass (___cm)] : no neck mass was observed [Thyroid Diffuse Enlargement] : the thyroid was not enlarged [Heart Rate And Rhythm] : heart rate and rhythm were normal [Thyroid Nodule] : there were no palpable thyroid nodules [Murmurs] : no murmurs present [Heart Sounds] : normal S1 and S2 [Respiration, Rhythm And Depth] : normal respiratory rhythm and effort [Auscultation Breath Sounds / Voice Sounds] : lungs were clear to auscultation bilaterally [Exaggerated Use Of Accessory Muscles For Inspiration] : no accessory muscle use [Abdomen Tenderness] : non-tender [Abdomen Mass (___ Cm)] : no abdominal mass palpated [Abdomen Soft] : soft [Gait - Sufficient For Exercise Testing] : the gait was sufficient for exercise testing [Abnormal Walk] : normal gait [Cyanosis, Localized] : no localized cyanosis [Nail Clubbing] : no clubbing of the fingernails [Petechial Hemorrhages (___cm)] : no petechial hemorrhages [] : no rash [Skin Turgor] : normal skin turgor [Skin Color & Pigmentation] : normal skin color and pigmentation [Sensation] : the sensory exam was normal to light touch and pinprick [Deep Tendon Reflexes (DTR)] : deep tendon reflexes were 2+ and symmetric [Impaired Insight] : insight and judgment were intact [Oriented To Time, Place, And Person] : oriented to person, place, and time [No Focal Deficits] : no focal deficits [Affect] : the affect was normal

## 2019-12-10 NOTE — HISTORY OF PRESENT ILLNESS
[FreeTextEntry1] : she is concerned about the CT scan.  She is sob going up the stairs.  She improved since she had the disability.  She improved 50%.  She had 3 joints replaced in two years.  She is walking on flat surface with no difficulty and the difficultly going up he stairs.  She feels that the air is not coming in.  Her legs swell up at the end of the day.  No wheezing.  No cough.

## 2019-12-10 NOTE — ASSESSMENT
[FreeTextEntry1] : Bronchial asthma.\par \par Patient never required neither admission, ER visit, urgent care visit, nor systemic steroids for her bronchial asthma. Patient using the nebulizer regular basis twice a day and rarely uses the hand held inhaler Ventolin. The patient is compliant with the Symbicort twice a day.  ACt score is 24/25. Asthma is controlled. The patient not a candidate for escalation of therapy. I considered that she might discontinue Singulair.\par \par Obstructive sleep apnea on CPAP\par \par Patient uses nasal CPAP. Patient tolerated the CPAP. Patient is compliant with the CPAP. No adverse effects from the CPAP. Epoworth scale is 3.\par \par Atelectasis\par \par CT scan of the chest revealed atelectasis which insignificant at this point\par \par Dyspnea on exertion\par \par Review the PFT which was consistent with mild restrictive lung disease wouldn't normal diffusion capacity dated March 2019, echocardiogram was unremarkable with no evidence of right ventricular strain no pulmonary hypertension, and a CT scan was negative for any interstitial lung disease area patient is compliant with the CPAP mask and her asthma is controlled. The dyspnea is most likely edematous resistance and improve by 50%. I explained to the patient that her dyspnea is most likely related to muscle skeletal reason and deconditioning. Patient had bilateral knee replacement August 2018 and will most likely recovering from her surgery.\par \par My recommendation at this point is patient to have a regular exercise program 3 times a week and we'll reevaluate in 2 months. Baseline oxygen saturation was normal.

## 2019-12-10 NOTE — REVIEW OF SYSTEMS
[Cough] : no cough [Sputum] : not coughing up ~M sputum [Hemoptysis] : no hemoptysis [Dyspnea] : dyspnea [Pleuritic Pain] : no pleuritic pain [Chest Tightness] : no chest tightness [Frequent URIs] : no frequent upper respiratory infections [Wheezing] : no wheezing [Chronically Infected with _____] : no chronic infections [Negative] : Sleep Disorder

## 2020-02-27 ENCOUNTER — APPOINTMENT (OUTPATIENT)
Dept: PULMONOLOGY | Facility: CLINIC | Age: 60
End: 2020-02-27

## 2020-04-25 ENCOUNTER — MESSAGE (OUTPATIENT)
Age: 60
End: 2020-04-25

## 2020-08-03 ENCOUNTER — APPOINTMENT (OUTPATIENT)
Dept: ORTHOPEDIC SURGERY | Facility: CLINIC | Age: 60
End: 2020-08-03

## 2020-08-05 ENCOUNTER — APPOINTMENT (OUTPATIENT)
Dept: ORTHOPEDIC SURGERY | Facility: CLINIC | Age: 60
End: 2020-08-05
Payer: COMMERCIAL

## 2020-08-05 VITALS — BODY MASS INDEX: 26.21 KG/M2 | HEIGHT: 59 IN | WEIGHT: 130 LBS

## 2020-08-05 VITALS — DIASTOLIC BLOOD PRESSURE: 77 MMHG | HEART RATE: 85 BPM | OXYGEN SATURATION: 95 % | SYSTOLIC BLOOD PRESSURE: 132 MMHG

## 2020-08-05 DIAGNOSIS — M54.9 DORSALGIA, UNSPECIFIED: ICD-10-CM

## 2020-08-05 PROCEDURE — 99213 OFFICE O/P EST LOW 20 MIN: CPT

## 2020-08-05 PROCEDURE — 73564 X-RAY EXAM KNEE 4 OR MORE: CPT | Mod: RT

## 2020-08-05 PROCEDURE — 72170 X-RAY EXAM OF PELVIS: CPT

## 2020-08-05 NOTE — HISTORY OF PRESENT ILLNESS
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 Gardner, DOS 8/28/18. She bumped the right patella about a week ago and had some prepatellar swelling that is gradually improving. She also complains of some right knee "heaviness" when going up and down stairs. She is still able to do 10,000 steps a day. She also tries to spend an hour per day on her stationary bike. She complains of worsening low back pain since the start of the pandemic.

## 2020-08-05 NOTE — DISCUSSION/SUMMARY
[de-identified] : 58y/o female 2 years s/p bilateral TKA, doing well; also with asymptomatic bilateral hip osteoarthritis and low back pain from degenerative scoliosis\par - She has a small subcutaneous hematoma at the anterior right patella that will resolve in time\par - Start PT for quad strengthening and attention to the low back\par - Cont HEP, daily low impact exercise\par - Follow up next year with repeat bilateral knee XRs or earlier as needed for new complaints

## 2020-08-05 NOTE — PHYSICAL EXAM
[de-identified] : General appearance: well nourished and hydrated, pleasant, alert and oriented x 3, cooperative.  \par HEENT: normocephalic, EOM intact, wearing mask, external auditory canal clear.  \par Cardiovascular: no lower leg edema, no varicosities, dorsalis pedis pulses palpable and symmetric.  \par Lymphatics: no palpable lymphadenopathy, no lymphedema.  \par Neurologic: sensation is normal, no muscle weakness in upper or lower extremities, patella tendon reflexes present and symmetric.  \par Dermatologic: skin moist, warm, no rash.  \par Spine: cervical spine with normal lordosis and painless range of motion, thoracic spine with normal kyphosis and painless range of motion, lumbosacral spine with normal lordosis and painless range of motion.  Moderate tenderness to palpation along midline lumbar spine and paraspinal musculature.  Sacroiliac joints nontender bilaterally. Negative SLR and crossed SLR tests bilaterally.\par Gait: normal.  \par \par Left knee:\par - Inspection: negative swelling, ecchymosis, and erythema.  \par - Wounds: healed midline incision.\par - Alignment: normal.  \par - Palpation: no specific tenderness on palpation.  \par - ROM active: 5-10 hyper - 130, no pain on extremes of motion\par - Ligamentous laxity: stable to varus stress, stable to valgus stress. About 5mm ant/post translation on flexion drawers. \par - Popliteal angle: 45 degrees\par - Muscle Test: 5/5 quad strength.  \par \par Right knee:\par - Inspection: focal rubbery soft tissue nodule over patella; nonpainful. Negative ecchymosis and erythema.  \par - Wounds: healed midline incision.\par - Alignment: normal.  \par - Palpation: no specific tenderness on palpation.  \par - ROM active: 5-10 hyper - 130, no pain on extremes of motion\par - Ligamentous laxity: stable to varus stress, stable to valgus stress. About 5mm ant/post translation on flexion drawers. \par - Popliteal angle: 45 degrees\par - Muscle Test: 5/5 quad strength.   [de-identified] : AP pelvis and 4 views of the bilateral knees (weightbearing AP, weightbearing Chisholm, weightbearing lateral, and Sunrise) were obtained today and interpreted by me.\par \par Bilateral hips demonstrate normal alignment with mild-moderate osteoarthritis (Tonnis 1-2). There is no proximal femoral or acetabular deformity. There is no fracture or prior fracture deformity. There is no radiographic evidence of osteonecrosis.\par \par Visualized portion of the lumbar spine demonstrates degenerative scoliosis.\par \par Bilateral sacroiliac joints appear normal without arthrosis.\par \par The left knee has a cemented PS Persona with resurfaced patella in position. Components appear well fixed and alignment is normal. No evidence of fracture, osteolysis, or loosening. The patella sits at appropriate height and tracks centrally. No interval change from most recent imaging.\par \par The right knee has a cemented PS Persona with resurfaced patella in position. Components appear well fixed and alignment is normal. No evidence of fracture, osteolysis, or loosening. The patella sits at appropriate height and tracks centrally. No interval change from most recent imaging.

## 2021-02-05 ENCOUNTER — OUTPATIENT (OUTPATIENT)
Dept: OUTPATIENT SERVICES | Facility: HOSPITAL | Age: 61
LOS: 1 days | End: 2021-02-05
Payer: COMMERCIAL

## 2021-02-05 ENCOUNTER — APPOINTMENT (OUTPATIENT)
Dept: ULTRASOUND IMAGING | Facility: HOSPITAL | Age: 61
End: 2021-02-05
Payer: COMMERCIAL

## 2021-02-05 DIAGNOSIS — Z98.890 OTHER SPECIFIED POSTPROCEDURAL STATES: Chronic | ICD-10-CM

## 2021-02-05 DIAGNOSIS — Z41.9 ENCOUNTER FOR PROCEDURE FOR PURPOSES OTHER THAN REMEDYING HEALTH STATE, UNSPECIFIED: Chronic | ICD-10-CM

## 2021-02-05 PROCEDURE — 76641 ULTRASOUND BREAST COMPLETE: CPT

## 2021-02-05 PROCEDURE — 76641 ULTRASOUND BREAST COMPLETE: CPT | Mod: 26,50

## 2021-07-21 ENCOUNTER — APPOINTMENT (OUTPATIENT)
Dept: ORTHOPEDIC SURGERY | Facility: CLINIC | Age: 61
End: 2021-07-21
Payer: COMMERCIAL

## 2021-07-21 ENCOUNTER — APPOINTMENT (OUTPATIENT)
Dept: ORTHOPEDIC SURGERY | Facility: CLINIC | Age: 61
End: 2021-07-21

## 2021-07-21 DIAGNOSIS — M70.42 PREPATELLAR BURSITIS, LEFT KNEE: ICD-10-CM

## 2021-07-21 DIAGNOSIS — M70.41 PREPATELLAR BURSITIS, RIGHT KNEE: ICD-10-CM

## 2021-07-21 PROCEDURE — 99213 OFFICE O/P EST LOW 20 MIN: CPT

## 2021-07-21 RX ORDER — HYDROXYCHLOROQUINE SULFATE 400 MG/1
TABLET ORAL
Refills: 0 | Status: ACTIVE | COMMUNITY

## 2021-07-22 PROBLEM — M70.42 PREPATELLAR BURSITIS OF LEFT KNEE: Status: ACTIVE | Noted: 2021-07-22

## 2021-07-22 PROBLEM — M70.41 PREPATELLAR BURSITIS OF RIGHT KNEE: Status: ACTIVE | Noted: 2021-07-22

## 2021-07-22 RX ORDER — ALENDRONATE SODIUM 35 MG/1
35 TABLET ORAL
Qty: 6 | Refills: 0 | Status: ACTIVE | COMMUNITY
Start: 2021-05-20

## 2021-07-22 RX ORDER — LOSARTAN POTASSIUM AND HYDROCHLOROTHIAZIDE 12.5; 5 MG/1; MG/1
50-12.5 TABLET ORAL
Qty: 90 | Refills: 0 | Status: ACTIVE | COMMUNITY
Start: 2021-03-15

## 2021-07-22 RX ORDER — ESTRADIOL 0.1 MG/G
0.1 CREAM VAGINAL
Qty: 42 | Refills: 0 | Status: ACTIVE | COMMUNITY
Start: 2021-03-17

## 2021-07-22 NOTE — DISCUSSION/SUMMARY
[de-identified] : 61y/o female 3 years s/p bilateral TKA, doing well; with asymptomatic bilateral prepatellar bursitis\par - Compressive dressings for the bursitis\par - Cont HEP, daily low impact exercise\par - Follow up next year with repeat bilateral knee XRs or earlier as needed for new complaints

## 2021-07-22 NOTE — PHYSICAL EXAM
[de-identified] : General appearance: well nourished and hydrated, pleasant, alert and oriented x 3, cooperative.  \par HEENT: normocephalic, EOM intact, wearing mask, external auditory canal clear.  \par Cardiovascular: no lower leg edema, no varicosities, dorsalis pedis pulses palpable and symmetric.  \par Lymphatics: no palpable lymphadenopathy, no lymphedema.  \par Neurologic: sensation is normal, no muscle weakness in upper or lower extremities, patella tendon reflexes present and symmetric.  \par Dermatologic: skin moist, warm, no rash.  \par Spine: cervical spine with normal lordosis and painless range of motion, thoracic spine with normal kyphosis and painless range of motion, lumbosacral spine with normal lordosis and painless range of motion.  Moderate tenderness to palpation along midline lumbar spine and paraspinal musculature.  Sacroiliac joints nontender bilaterally. Negative SLR and crossed SLR tests bilaterally.\par Gait: normal.  \par \par Left knee:\par - Inspection: mild prepatellar swelling, negative ecchymosis and erythema.  \par - Wounds: healed midline incision.\par - Alignment: normal.  \par - Palpation: no specific tenderness on palpation.  \par - ROM active: 0 - 130, no pain on extremes of motion\par - Ligamentous laxity: stable to varus stress, stable to valgus stress. About 5mm ant/post translation on flexion drawers. \par - Popliteal angle: 45 degrees\par - Muscle Test: 5/5 quad strength.  \par \par Right knee:\par - Inspection: prepatellar swelling. Negative ecchymosis and erythema.  \par - Wounds: healed midline incision.\par - Alignment: normal.  \par - Palpation: no specific tenderness on palpation.  \par - ROM active: 0 - 130, no pain on extremes of motion\par - Ligamentous laxity: stable to varus stress, stable to valgus stress. About 5mm ant/post translation on flexion drawers. \par - Popliteal angle: 45 degrees\par - Muscle Test: 5/5 quad strength.   [de-identified] : 4 views of the bilateral knees (weightbearing AP, weightbearing Chisholm, weightbearing lateral, and Sunrise) were obtained today and interpreted by me.\par \par The left knee has a cemented PS Persona with resurfaced patella in position. Components appear well fixed and alignment is normal. No evidence of fracture, osteolysis, or loosening. The patella sits at appropriate height and tracks centrally. No interval change from most recent imaging.\par \par The right knee has a cemented PS Persona with resurfaced patella in position. Components appear well fixed and alignment is normal. No evidence of fracture, osteolysis, or loosening. The patella sits at appropriate height and tracks centrally. No interval change from most recent imaging.

## 2021-07-22 NOTE — HISTORY OF PRESENT ILLNESS
[de-identified] : 7/21/21: 3yr postop from B TKA, Dr. Gardner. No pain, no limitations. Still walking 10k steps. She does have some persistent bilateral prepatellar soft tissue swelling a bit worse on the right, which she attributes to constantly bumping the patella onto one particular chair in her home. 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 Gardner, DOS 8/28/18. She bumped the right patella about a week ago and had some prepatellar swelling that is gradually improving. She also complains of some right knee "heaviness" when going up and down stairs. She is still able to do 10,000 steps a day. She also tries to spend an hour per day on her stationary bike. She complains of worsening low back pain since the start of the pandemic.

## 2021-07-28 NOTE — PATIENT PROFILE ADULT. - NS PRO AD PATIENT TYPE ON CHART
[FreeTextEntry1] : 18 yo male with acute onset right scrotal pain that started Monday night.  He was seen at [facility] that evening and sent to the ED for evaluation of possible torsion.  US i the ED showed normal blood flow with no evidence of testicular mass.  It did show an enlarged right epididymis with hypervascularity consistent with epididymitis.  A CT scan was also performed to r/o appendicitis.  He as admitted overnight for observation and evaluation by surgery.  Surgery ultimately felt that he did not have appendicitis.  His was discharge don Tuesday with a 10 day course of doxycycline to treat his epididymitis.  He is currently feeling better with decreased scrotal pain.  He denies any dysuria, urgency, hematuria, frequency, or penile discharge,  He is not sexually active.  He denies any scrotal trauma.   Health Care Proxy (HCP)

## 2022-02-14 ENCOUNTER — APPOINTMENT (OUTPATIENT)
Dept: ULTRASOUND IMAGING | Facility: HOSPITAL | Age: 62
End: 2022-02-14
Payer: COMMERCIAL

## 2022-02-14 ENCOUNTER — APPOINTMENT (OUTPATIENT)
Dept: MAMMOGRAPHY | Facility: HOSPITAL | Age: 62
End: 2022-02-14
Payer: COMMERCIAL

## 2022-02-14 ENCOUNTER — OUTPATIENT (OUTPATIENT)
Dept: OUTPATIENT SERVICES | Facility: HOSPITAL | Age: 62
LOS: 1 days | End: 2022-02-14
Payer: COMMERCIAL

## 2022-02-14 DIAGNOSIS — Z41.9 ENCOUNTER FOR PROCEDURE FOR PURPOSES OTHER THAN REMEDYING HEALTH STATE, UNSPECIFIED: Chronic | ICD-10-CM

## 2022-02-14 DIAGNOSIS — Z98.890 OTHER SPECIFIED POSTPROCEDURAL STATES: Chronic | ICD-10-CM

## 2022-02-14 PROCEDURE — 77063 BREAST TOMOSYNTHESIS BI: CPT

## 2022-02-14 PROCEDURE — 77065 DX MAMMO INCL CAD UNI: CPT | Mod: 26,GG,LT

## 2022-02-14 PROCEDURE — 76641 ULTRASOUND BREAST COMPLETE: CPT | Mod: 26,50

## 2022-02-14 PROCEDURE — 77063 BREAST TOMOSYNTHESIS BI: CPT | Mod: 26

## 2022-02-14 PROCEDURE — 77065 DX MAMMO INCL CAD UNI: CPT

## 2022-02-14 PROCEDURE — 77067 SCR MAMMO BI INCL CAD: CPT

## 2022-02-14 PROCEDURE — 77067 SCR MAMMO BI INCL CAD: CPT | Mod: 26

## 2022-02-14 PROCEDURE — 76641 ULTRASOUND BREAST COMPLETE: CPT

## 2022-03-11 ENCOUNTER — INPATIENT (INPATIENT)
Facility: HOSPITAL | Age: 62
LOS: 2 days | Discharge: HOME | DRG: 871 | End: 2022-03-14
Attending: SPECIALIST | Admitting: INTERNAL MEDICINE
Payer: COMMERCIAL

## 2022-03-11 VITALS
RESPIRATION RATE: 18 BRPM | OXYGEN SATURATION: 97 % | HEART RATE: 78 BPM | DIASTOLIC BLOOD PRESSURE: 65 MMHG | SYSTOLIC BLOOD PRESSURE: 100 MMHG | WEIGHT: 132.28 LBS | HEIGHT: 59 IN | TEMPERATURE: 100 F

## 2022-03-11 DIAGNOSIS — Z41.9 ENCOUNTER FOR PROCEDURE FOR PURPOSES OTHER THAN REMEDYING HEALTH STATE, UNSPECIFIED: Chronic | ICD-10-CM

## 2022-03-11 DIAGNOSIS — Z98.890 OTHER SPECIFIED POSTPROCEDURAL STATES: Chronic | ICD-10-CM

## 2022-03-11 LAB
ALBUMIN SERPL ELPH-MCNC: 3.6 G/DL — SIGNIFICANT CHANGE UP (ref 3.3–5)
ALBUMIN SERPL ELPH-MCNC: 4.1 G/DL — SIGNIFICANT CHANGE UP (ref 3.3–5)
ALP SERPL-CCNC: 54 U/L — SIGNIFICANT CHANGE UP (ref 40–120)
ALP SERPL-CCNC: SIGNIFICANT CHANGE UP (ref 40–120)
ALT FLD-CCNC: 13 U/L — SIGNIFICANT CHANGE UP (ref 10–45)
ALT FLD-CCNC: SIGNIFICANT CHANGE UP (ref 10–45)
AMYLASE P1 CFR SERPL: 54 U/L — SIGNIFICANT CHANGE UP (ref 25–125)
ANION GAP SERPL CALC-SCNC: 11 MMOL/L — SIGNIFICANT CHANGE UP (ref 5–17)
APPEARANCE UR: CLEAR — SIGNIFICANT CHANGE UP
APTT BLD: 30.2 SEC — SIGNIFICANT CHANGE UP (ref 27.5–35.5)
AST SERPL-CCNC: 18 U/L — SIGNIFICANT CHANGE UP (ref 10–40)
AST SERPL-CCNC: SIGNIFICANT CHANGE UP (ref 10–40)
BACTERIA # UR AUTO: ABNORMAL /HPF
BASE EXCESS BLDV CALC-SCNC: 3.6 MMOL/L — HIGH (ref -2–3)
BASOPHILS # BLD AUTO: 0.02 K/UL — SIGNIFICANT CHANGE UP (ref 0–0.2)
BASOPHILS NFR BLD AUTO: 0.3 % — SIGNIFICANT CHANGE UP (ref 0–2)
BILIRUB SERPL-MCNC: 0.6 MG/DL — SIGNIFICANT CHANGE UP (ref 0.2–1.2)
BILIRUB SERPL-MCNC: 0.6 MG/DL — SIGNIFICANT CHANGE UP (ref 0.2–1.2)
BILIRUB UR-MCNC: NEGATIVE — SIGNIFICANT CHANGE UP
BUN SERPL-MCNC: 11 MG/DL — SIGNIFICANT CHANGE UP (ref 7–23)
BUN SERPL-MCNC: 11 MG/DL — SIGNIFICANT CHANGE UP (ref 7–23)
BUN SERPL-MCNC: 12 MG/DL — SIGNIFICANT CHANGE UP (ref 7–23)
CA-I SERPL-SCNC: 0.97 MMOL/L — LOW (ref 1.15–1.33)
CALCIUM SERPL-MCNC: 8.3 MG/DL — LOW (ref 8.4–10.5)
CALCIUM SERPL-MCNC: 9.3 MG/DL — SIGNIFICANT CHANGE UP (ref 8.4–10.5)
CHLORIDE SERPL-SCNC: 100 MMOL/L — SIGNIFICANT CHANGE UP (ref 96–108)
CHLORIDE SERPL-SCNC: 104 MMOL/L — SIGNIFICANT CHANGE UP (ref 96–108)
CO2 BLDV-SCNC: 31.7 MMOL/L — HIGH (ref 22–26)
CO2 SERPL-SCNC: 25 MMOL/L — SIGNIFICANT CHANGE UP (ref 22–31)
CO2 SERPL-SCNC: 30 MMOL/L — SIGNIFICANT CHANGE UP (ref 22–31)
CO2 SERPL-SCNC: SIGNIFICANT CHANGE UP (ref 22–31)
COLOR SPEC: YELLOW — SIGNIFICANT CHANGE UP
COMMENT - URINE: SIGNIFICANT CHANGE UP
COMMENT - URINE: SIGNIFICANT CHANGE UP
CREAT SERPL-MCNC: 0.67 MG/DL — SIGNIFICANT CHANGE UP (ref 0.5–1.3)
CREAT SERPL-MCNC: 0.72 MG/DL — SIGNIFICANT CHANGE UP (ref 0.5–1.3)
CREAT SERPL-MCNC: 0.72 MG/DL — SIGNIFICANT CHANGE UP (ref 0.5–1.3)
DIFF PNL FLD: NEGATIVE — SIGNIFICANT CHANGE UP
EGFR: 94 ML/MIN/1.73M2 — SIGNIFICANT CHANGE UP
EGFR: 94 ML/MIN/1.73M2 — SIGNIFICANT CHANGE UP
EGFR: 98 ML/MIN/1.73M2 — SIGNIFICANT CHANGE UP
EOSINOPHIL # BLD AUTO: 0.05 K/UL — SIGNIFICANT CHANGE UP (ref 0–0.5)
EOSINOPHIL NFR BLD AUTO: 0.6 % — SIGNIFICANT CHANGE UP (ref 0–6)
EPI CELLS # UR: SIGNIFICANT CHANGE UP /HPF (ref 0–5)
GAS PNL BLDV: 140 MMOL/L — SIGNIFICANT CHANGE UP (ref 136–145)
GAS PNL BLDV: SIGNIFICANT CHANGE UP
GAS PNL BLDV: SIGNIFICANT CHANGE UP
GLUCOSE SERPL-MCNC: 113 MG/DL — HIGH (ref 70–99)
GLUCOSE SERPL-MCNC: 123 MG/DL — HIGH (ref 70–99)
GLUCOSE SERPL-MCNC: 123 MG/DL — HIGH (ref 70–99)
GLUCOSE UR QL: NEGATIVE — SIGNIFICANT CHANGE UP
GRAN CASTS # UR COMP ASSIST: ABNORMAL /LPF
HCO3 BLDV-SCNC: 30 MMOL/L — HIGH (ref 22–29)
HCT VFR BLD CALC: 38.4 % — SIGNIFICANT CHANGE UP (ref 34.5–45)
HCT VFR BLD CALC: 42.2 % — SIGNIFICANT CHANGE UP (ref 34.5–45)
HCT VFR BLD CALC: 46.6 % — HIGH (ref 34.5–45)
HGB BLD-MCNC: 12.4 G/DL — SIGNIFICANT CHANGE UP (ref 11.5–15.5)
HGB BLD-MCNC: 12.6 G/DL — SIGNIFICANT CHANGE UP (ref 11.5–15.5)
HGB BLD-MCNC: 15.5 G/DL — SIGNIFICANT CHANGE UP (ref 11.5–15.5)
IMM GRANULOCYTES NFR BLD AUTO: 0.4 % — SIGNIFICANT CHANGE UP (ref 0–1.5)
INR BLD: 0.99 — SIGNIFICANT CHANGE UP (ref 0.88–1.16)
KETONES UR-MCNC: NEGATIVE — SIGNIFICANT CHANGE UP
LACTATE SERPL-SCNC: 0.9 MMOL/L — SIGNIFICANT CHANGE UP (ref 0.5–2)
LACTATE SERPL-SCNC: 1.4 MMOL/L — SIGNIFICANT CHANGE UP (ref 0.5–2)
LEUKOCYTE ESTERASE UR-ACNC: NEGATIVE — SIGNIFICANT CHANGE UP
LIDOCAIN IGE QN: 36 U/L — SIGNIFICANT CHANGE UP (ref 7–60)
LYMPHOCYTES # BLD AUTO: 0.79 K/UL — LOW (ref 1–3.3)
LYMPHOCYTES # BLD AUTO: 10.2 % — LOW (ref 13–44)
MCHC RBC-ENTMCNC: 29.9 GM/DL — LOW (ref 32–36)
MCHC RBC-ENTMCNC: 31.6 PG — SIGNIFICANT CHANGE UP (ref 27–34)
MCHC RBC-ENTMCNC: 31.8 PG — SIGNIFICANT CHANGE UP (ref 27–34)
MCHC RBC-ENTMCNC: 32.3 GM/DL — SIGNIFICANT CHANGE UP (ref 32–36)
MCHC RBC-ENTMCNC: 33.1 PG — SIGNIFICANT CHANGE UP (ref 27–34)
MCHC RBC-ENTMCNC: 33.3 GM/DL — SIGNIFICANT CHANGE UP (ref 32–36)
MCV RBC AUTO: 105.8 FL — HIGH (ref 80–100)
MCV RBC AUTO: 98.5 FL — SIGNIFICANT CHANGE UP (ref 80–100)
MCV RBC AUTO: 99.6 FL — SIGNIFICANT CHANGE UP (ref 80–100)
MONOCYTES # BLD AUTO: 0.72 K/UL — SIGNIFICANT CHANGE UP (ref 0–0.9)
MONOCYTES NFR BLD AUTO: 9.3 % — SIGNIFICANT CHANGE UP (ref 2–14)
NEUTROPHILS # BLD AUTO: 6.1 K/UL — SIGNIFICANT CHANGE UP (ref 1.8–7.4)
NEUTROPHILS NFR BLD AUTO: 79.2 % — HIGH (ref 43–77)
NITRITE UR-MCNC: NEGATIVE — SIGNIFICANT CHANGE UP
NRBC # BLD: 0 /100 WBCS — SIGNIFICANT CHANGE UP (ref 0–0)
NRBC # BLD: 0 /100 WBCS — SIGNIFICANT CHANGE UP (ref 0–0)
PCO2 BLDV: 52 MMHG — HIGH (ref 39–42)
PH BLDV: 7.37 — SIGNIFICANT CHANGE UP (ref 7.32–7.43)
PH UR: 7 — SIGNIFICANT CHANGE UP (ref 5–8)
PLATELET # BLD AUTO: 164 K/UL — SIGNIFICANT CHANGE UP (ref 150–400)
PLATELET # BLD AUTO: 166 K/UL — SIGNIFICANT CHANGE UP (ref 150–400)
PLATELET # BLD AUTO: 238 K/UL — SIGNIFICANT CHANGE UP (ref 150–400)
PO2 BLDV: 35 MMHG — SIGNIFICANT CHANGE UP (ref 25–45)
POTASSIUM BLDV-SCNC: 2.7 MMOL/L — CRITICAL LOW (ref 3.5–5.1)
POTASSIUM SERPL-MCNC: 3.2 MMOL/L — LOW (ref 3.5–5.3)
POTASSIUM SERPL-MCNC: SIGNIFICANT CHANGE UP (ref 3.5–5.3)
POTASSIUM SERPL-SCNC: 3.2 MMOL/L — LOW (ref 3.5–5.3)
POTASSIUM SERPL-SCNC: SIGNIFICANT CHANGE UP (ref 3.5–5.3)
PROT SERPL-MCNC: 6 G/DL — SIGNIFICANT CHANGE UP (ref 6–8.3)
PROT SERPL-MCNC: 7.2 G/DL — SIGNIFICANT CHANGE UP (ref 6–8.3)
PROT UR-MCNC: ABNORMAL MG/DL
PROTHROM AB SERPL-ACNC: 11.8 SEC — SIGNIFICANT CHANGE UP (ref 10.5–13.4)
RBC # BLD: 3.9 M/UL — SIGNIFICANT CHANGE UP (ref 3.8–5.2)
RBC # BLD: 3.99 M/UL — SIGNIFICANT CHANGE UP (ref 3.8–5.2)
RBC # BLD: 4.68 M/UL — SIGNIFICANT CHANGE UP (ref 3.8–5.2)
RBC # FLD: 12.7 % — SIGNIFICANT CHANGE UP (ref 10.3–14.5)
RBC # FLD: 12.8 % — SIGNIFICANT CHANGE UP (ref 10.3–14.5)
RBC # FLD: 12.9 % — SIGNIFICANT CHANGE UP (ref 10.3–14.5)
RBC CASTS # UR COMP ASSIST: < 5 /HPF — SIGNIFICANT CHANGE UP
SAO2 % BLDV: 56.1 % — LOW (ref 67–88)
SARS-COV-2 RNA SPEC QL NAA+PROBE: SIGNIFICANT CHANGE UP
SODIUM SERPL-SCNC: 142 MMOL/L — SIGNIFICANT CHANGE UP (ref 135–145)
SODIUM SERPL-SCNC: 145 MMOL/L — SIGNIFICANT CHANGE UP (ref 135–145)
SP GR SPEC: 1.01 — SIGNIFICANT CHANGE UP (ref 1–1.03)
TROPONIN T SERPL-MCNC: 0.01 NG/ML — SIGNIFICANT CHANGE UP (ref 0–0.01)
UROBILINOGEN FLD QL: 0.2 E.U./DL — SIGNIFICANT CHANGE UP
WBC # BLD: 4.39 K/UL — SIGNIFICANT CHANGE UP (ref 3.8–10.5)
WBC # BLD: 5.61 K/UL — SIGNIFICANT CHANGE UP (ref 3.8–10.5)
WBC # BLD: 7.71 K/UL — SIGNIFICANT CHANGE UP (ref 3.8–10.5)
WBC # FLD AUTO: 4.39 K/UL — SIGNIFICANT CHANGE UP (ref 3.8–10.5)
WBC # FLD AUTO: 5.61 K/UL — SIGNIFICANT CHANGE UP (ref 3.8–10.5)
WBC # FLD AUTO: 7.71 K/UL — SIGNIFICANT CHANGE UP (ref 3.8–10.5)
WBC UR QL: ABNORMAL /HPF

## 2022-03-11 PROCEDURE — 36620 INSERTION CATHETER ARTERY: CPT

## 2022-03-11 PROCEDURE — 99291 CRITICAL CARE FIRST HOUR: CPT

## 2022-03-11 PROCEDURE — 76700 US EXAM ABDOM COMPLETE: CPT | Mod: 26

## 2022-03-11 PROCEDURE — 76937 US GUIDE VASCULAR ACCESS: CPT | Mod: 26

## 2022-03-11 PROCEDURE — 93010 ELECTROCARDIOGRAM REPORT: CPT

## 2022-03-11 PROCEDURE — 99285 EMERGENCY DEPT VISIT HI MDM: CPT | Mod: 25

## 2022-03-11 PROCEDURE — 36600 WITHDRAWAL OF ARTERIAL BLOOD: CPT | Mod: 59

## 2022-03-11 PROCEDURE — 71045 X-RAY EXAM CHEST 1 VIEW: CPT | Mod: 26

## 2022-03-11 RX ORDER — ACETAMINOPHEN 500 MG
1000 TABLET ORAL ONCE
Refills: 0 | Status: COMPLETED | OUTPATIENT
Start: 2022-03-11 | End: 2022-03-11

## 2022-03-11 RX ORDER — SODIUM CHLORIDE 9 MG/ML
1000 INJECTION INTRAMUSCULAR; INTRAVENOUS; SUBCUTANEOUS ONCE
Refills: 0 | Status: COMPLETED | OUTPATIENT
Start: 2022-03-11 | End: 2022-03-11

## 2022-03-11 RX ORDER — VASOPRESSIN 20 [USP'U]/ML
0.03 INJECTION INTRAVENOUS
Qty: 50 | Refills: 0 | Status: DISCONTINUED | OUTPATIENT
Start: 2022-03-11 | End: 2022-03-12

## 2022-03-11 RX ORDER — ONDANSETRON 8 MG/1
4 TABLET, FILM COATED ORAL ONCE
Refills: 0 | Status: COMPLETED | OUTPATIENT
Start: 2022-03-11 | End: 2022-03-11

## 2022-03-11 RX ORDER — POTASSIUM CHLORIDE 20 MEQ
40 PACKET (EA) ORAL ONCE
Refills: 0 | Status: COMPLETED | OUTPATIENT
Start: 2022-03-11 | End: 2022-03-11

## 2022-03-11 RX ORDER — NOREPINEPHRINE BITARTRATE/D5W 8 MG/250ML
0.05 PLASTIC BAG, INJECTION (ML) INTRAVENOUS
Qty: 8 | Refills: 0 | Status: DISCONTINUED | OUTPATIENT
Start: 2022-03-11 | End: 2022-03-13

## 2022-03-11 RX ORDER — VANCOMYCIN HCL 1 G
1000 VIAL (EA) INTRAVENOUS ONCE
Refills: 0 | Status: COMPLETED | OUTPATIENT
Start: 2022-03-11 | End: 2022-03-11

## 2022-03-11 RX ORDER — PIPERACILLIN AND TAZOBACTAM 4; .5 G/20ML; G/20ML
3.38 INJECTION, POWDER, LYOPHILIZED, FOR SOLUTION INTRAVENOUS EVERY 6 HOURS
Refills: 0 | Status: DISCONTINUED | OUTPATIENT
Start: 2022-03-12 | End: 2022-03-14

## 2022-03-11 RX ORDER — PIPERACILLIN AND TAZOBACTAM 4; .5 G/20ML; G/20ML
3.38 INJECTION, POWDER, LYOPHILIZED, FOR SOLUTION INTRAVENOUS ONCE
Refills: 0 | Status: COMPLETED | OUTPATIENT
Start: 2022-03-11 | End: 2022-03-11

## 2022-03-11 RX ORDER — MIDAZOLAM HYDROCHLORIDE 1 MG/ML
1 INJECTION, SOLUTION INTRAMUSCULAR; INTRAVENOUS ONCE
Refills: 0 | Status: DISCONTINUED | OUTPATIENT
Start: 2022-03-11 | End: 2022-03-11

## 2022-03-11 RX ORDER — PANTOPRAZOLE SODIUM 20 MG/1
40 TABLET, DELAYED RELEASE ORAL EVERY 12 HOURS
Refills: 0 | Status: DISCONTINUED | OUTPATIENT
Start: 2022-03-11 | End: 2022-03-14

## 2022-03-11 RX ORDER — CHLORHEXIDINE GLUCONATE 213 G/1000ML
1 SOLUTION TOPICAL
Refills: 0 | Status: DISCONTINUED | OUTPATIENT
Start: 2022-03-11 | End: 2022-03-14

## 2022-03-11 RX ORDER — VANCOMYCIN HCL 1 G
1000 VIAL (EA) INTRAVENOUS EVERY 12 HOURS
Refills: 0 | Status: DISCONTINUED | OUTPATIENT
Start: 2022-03-12 | End: 2022-03-12

## 2022-03-11 RX ORDER — POTASSIUM CHLORIDE 20 MEQ
10 PACKET (EA) ORAL
Refills: 0 | Status: DISCONTINUED | OUTPATIENT
Start: 2022-03-11 | End: 2022-03-12

## 2022-03-11 RX ADMIN — SODIUM CHLORIDE 1000 MILLILITER(S): 9 INJECTION INTRAMUSCULAR; INTRAVENOUS; SUBCUTANEOUS at 19:26

## 2022-03-11 RX ADMIN — SODIUM CHLORIDE 1000 MILLILITER(S): 9 INJECTION INTRAMUSCULAR; INTRAVENOUS; SUBCUTANEOUS at 18:26

## 2022-03-11 RX ADMIN — SODIUM CHLORIDE 1000 MILLILITER(S): 9 INJECTION INTRAMUSCULAR; INTRAVENOUS; SUBCUTANEOUS at 19:53

## 2022-03-11 RX ADMIN — PANTOPRAZOLE SODIUM 40 MILLIGRAM(S): 20 TABLET, DELAYED RELEASE ORAL at 21:34

## 2022-03-11 RX ADMIN — Medication 40 MILLIEQUIVALENT(S): at 18:55

## 2022-03-11 RX ADMIN — ONDANSETRON 4 MILLIGRAM(S): 8 TABLET, FILM COATED ORAL at 15:27

## 2022-03-11 RX ADMIN — Medication 1000 MILLIGRAM(S): at 18:42

## 2022-03-11 RX ADMIN — SODIUM CHLORIDE 1000 MILLILITER(S): 9 INJECTION INTRAMUSCULAR; INTRAVENOUS; SUBCUTANEOUS at 15:28

## 2022-03-11 RX ADMIN — PIPERACILLIN AND TAZOBACTAM 3.38 GRAM(S): 4; .5 INJECTION, POWDER, LYOPHILIZED, FOR SOLUTION INTRAVENOUS at 20:50

## 2022-03-11 RX ADMIN — SODIUM CHLORIDE 1000 MILLILITER(S): 9 INJECTION INTRAMUSCULAR; INTRAVENOUS; SUBCUTANEOUS at 16:28

## 2022-03-11 RX ADMIN — SODIUM CHLORIDE 1000 MILLILITER(S): 9 INJECTION INTRAMUSCULAR; INTRAVENOUS; SUBCUTANEOUS at 20:25

## 2022-03-11 RX ADMIN — Medication 250 MILLIGRAM(S): at 20:41

## 2022-03-11 RX ADMIN — Medication 400 MILLIGRAM(S): at 18:12

## 2022-03-11 RX ADMIN — Medication 100 MILLIEQUIVALENT(S): at 23:00

## 2022-03-11 RX ADMIN — Medication 5.63 MICROGRAM(S)/KG/MIN: at 21:52

## 2022-03-11 RX ADMIN — PIPERACILLIN AND TAZOBACTAM 200 GRAM(S): 4; .5 INJECTION, POWDER, LYOPHILIZED, FOR SOLUTION INTRAVENOUS at 20:20

## 2022-03-11 RX ADMIN — ONDANSETRON 4 MILLIGRAM(S): 8 TABLET, FILM COATED ORAL at 21:45

## 2022-03-11 NOTE — ED PROVIDER NOTE - NSICDXPASTMEDICALHX_GEN_ALL_CORE_FT
Patient remains intermittently sleepy.  Arouses easily, but drifts off to sleep when left alone.  Tolerated po food and fluids.  Needs reminders to keep head down on pillow and not to move right leg.  Plan for admission to observation overnight for pain control.  Have not given additional pain medication yet due to ongoing sleepiness.  Patient informed of planned admit.  Wife, Raven called and updated regarding POC.   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

## 2022-03-11 NOTE — H&P ADULT - ATTENDING COMMENTS
This patient was evaluated as an ICU consult, please see the consultation note for the details.  Obtain trans thoracic cardiac echo.

## 2022-03-11 NOTE — ED PROVIDER NOTE - ATTENDING CONTRIBUTION TO CARE
61 year old female with remote history of ovarian CA s/p DENNIS, hernia repair sx presents to ED with concern for epigastric pain with associated nausea, emesis x 2 days.  Patient seen by her PCP, Dr. White, today and sent to ED for further evaluation.  She notes pain is similar to what she has experienced with GERD in the past.  On my face to face ED eval, patient is non toxic in appearance.  HRRR, lungs clear.  Abd soft, with mild reproducible epigastric pain on palpation.  Will obtain labs, US, treat symptoms and dispo accordingly.

## 2022-03-11 NOTE — ED ADULT NURSE NOTE - OBJECTIVE STATEMENT
Received patient in stretcher with c/o of epigastric pain with non bilious vomiting. AOX4. VSS.  Patient denies chest pain, shortness of breath, difficulty breathing and any form of distress not noted. Patient oriented to ED area. All needs attended. Rounding in progress. Fall risk precautions maintained.

## 2022-03-11 NOTE — H&P ADULT - HISTORY OF PRESENT ILLNESS
Incomplete Patient is a 60 yo F with a past medical Hx of HTN, asthma, RA, prior ovarian CA s/p DENNIS with BSO, and umbilical hernia repair, who presents from home due to epigastric pain since yesterday evening.     Patient reports having acute epigastric pain with nausea and NBNB vomiting after eating takeout Chinese food yesterday evening. She attempted OTC Mylanta, which improved her abdominal pain initially, but came into the ED later today due to persistent abdominal pain.     Patient had a drop in SBP from 100 to 70s after 2 liters of fluid resuscitation in the ED. ICU consulted for hypotension with sBP in the 70s-80s.    Patient was seen and examined at bedside in the ED. Patient appears uncomfortable but speaking in full sentences. She reports her nausea has resolved but still has abdominal pain. She also reports feeling a bit more short of breath than earlier in her stay in the ED. Denies any  Patient is a 62 yo F with a past medical Hx of HTN, asthma, RA, prior ovarian CA s/p DENNIS with BSO, and umbilical hernia repair, who presents from home due to epigastric pain since yesterday evening. Patient reports having acute epigastric pain with nausea and NBNB vomiting after eating takeout Chinese food yesterday evening. Patient attempted OTC Mylanta, which improved her abdominal pain initially, but came into the ED later today due to persistent abdominal pain. Patient appears uncomfortable, but speaking in full sentences. She reports her nausea has resolved, but still has abdominal discomfort    ED Course  Vitals: T 101.8 BP 73/44 HR 81 SaO2 96 RA  Labs: WBC 7.76, Hb 15.5, platelets 238, Na 145, K 3.2, HCO3- 30, creatinine 0.72, COVID-19 negative   Imaging: Chest X Ray shows no acute cardiopulmonary   Medication: 3L NS, IV Tylenol 1g, IV Zofran 4mg, Vancomycin 1g, Zosyn 3.375g     Patient transferred to the MICU for management of shock Patient is a 62 yo F with a past medical Hx of HTN, asthma, RA, prior ovarian CA s/p DENNIS with BSO, and umbilical hernia repair, who presents from home due to epigastric pain since yesterday evening. Patient reports having acute epigastric pain with nausea and NBNB vomiting after eating takeout Chinese food yesterday evening. Patient attempted OTC Mylanta, which improved her abdominal pain initially, but came into the ED later today due to persistent abdominal pain. Patient appears uncomfortable, but speaking in full sentences. She reports her nausea has resolved, but still has abdominal discomfort    ED Course  Vitals: T 101.8 BP 73/44 HR 81 SaO2 96 RA  Labs: WBC 7.76, Hb 15.5, platelets 238, Na 145, K 3.2, HCO3- 30, creatinine 0.72, COVID-19 negative   Imaging: Chest X Ray shows no acute cardiopulmonary, elevated L hemidiaphragm  Medication: 3L NS, IV Tylenol 1g, IV Zofran 4mg, Vancomycin 1g, Zosyn 3.375g     Patient transferred to the MICU for management of shock

## 2022-03-11 NOTE — CONSULT NOTE ADULT - ASSESSMENT
62 y/o woman with a PMHx of HTN, asthma, RA (on plaquenil), prior ovarian CA s/p DENNIS with BSO, and umbilical hernia repair, who presents from home due to epigastric pain since yesterday evening. Abdominal US without significant findings. ICU consulted for hypotension with sBP in the 70s-80s.    NEURO  AAOx3, no acute intervention      CARDIOVASCULAR  #Hypotension  Patient presents with sBP in the 100s. After receiving 2L of fluids in the ED, repeat sBP in the 70s-80s.     PULMONARY    GI  #Epigastric pain  Hx of prior epigastric pain. Patient follows with Dr. White outpatient. Presents with acute epigastric pain since yesterday evening     #Elevated L hemidiaphragm  CXR done in ED with elevated L hemidiaphragm (compared to prior CXR in 2018)    RENAL  -   - strict I/Os    DISPO: MICU  CODE STATUS: Full    Discussed with Dr. Baxter 60 y/o woman with a PMHx of HTN, asthma, RA (on plaquenil), prior ovarian CA s/p DENNIS with BSO, and umbilical hernia repair, who presents from home due to epigastric pain since yesterday evening. Abdominal US without significant findings. ICU consulted for hypotension with sBP in the 70s-80s.    NEURO  AAOx3, no acute intervention    CARDIOVASCULAR  #Hypotension  Patient presents with sBP in the 100s. After receiving 2L of fluids in the ED, repeat sBP in the 70s-80s. Lactate negative. Patient currently mentating well with urine output  - start levophed if hypotension persists or changes in mental status or decreased UOP  - strict I/Os to monitor UOP     PULM  #Dyspnea  Patient dyspneic appearing, possibly 2/2 pain vs concern for bowel herniation to L lung vs concern for esophageal perforation. Patient s/p 3L NS in ED  - caution with further fluids  - monitor respiratory status  - f/u CT chest     GI  #Epigastric pain  Hx of prior epigastric pain. Patient follows with Dr. White outpatient. Presents with acute epigastric pain since yesterday evening  - IV PPI BID  - keep strictly NPO  - f/u CT abd/pelvis    #Elevated L hemidiaphragm  CXR done in ED with elevated L hemidiaphragm (compared to prior CXR in 2018). Concern for herniated bowels vs concern for esophageal perforation  - f/u CT chest/abd/pelvis -- if any signs of esophageal perforation, consult CTS immediately  - monitor respiratory status    RENAL  Making urine  - strict I/Os    DISPO: MICU  CODE STATUS: Full    Discussed with Dr. Baxter

## 2022-03-11 NOTE — ED PROVIDER NOTE - CLINICAL SUMMARY MEDICAL DECISION MAKING FREE TEXT BOX
61y Female PMH of DENNIS w/BSO for ovarian cancer and s/p umbilical hernia repair p/w sharp epigastric pain with N/V found to febrile. CMP, RUQ U/S pending... 61y Female PMH of DENNIS w/BSO for ovarian cancer and s/p umbilical hernia repair p/w sharp epigastric pain with N/V found to febrile. CMP, RUQ U/S pending 61y Female PMH of DENNIS w/BSO for ovarian cancer and s/p umbilical hernia repair p/w sharp epigastric pain with N/V found to febrile to 101.8. CMP unremarkable. RUQ U/S showing stones and biliary sludge not causing obstruction. When patient returned from ultrasound became hypotensive to 70s/50s s/p 3L NS improved to 80s. CXR showed possible diaphragmatic hernia on the left, but no pulmonary edema. CTAP pending. MICU consulted for admission.

## 2022-03-11 NOTE — H&P ADULT - NSHPPHYSICALEXAM_GEN_ALL_CORE
Constitutional: WDWN , mild distress  Head: NC/AT  Eyes: PERRL, EOMI, anicteric sclera  ENT: no nasal discharge; uvula midline, no oropharyngeal erythema or exudates; MMM  Neck: supple; no JVD or thyromegaly  Respiratory: CTA B/L; no W/R/R, no retractions  Cardiac: +S1/S2; RRR; no M/R/G; PMI non-displaced  Gastrointestinal: soft, mild tenderness to epigastric palpation; no rebound or guarding; +BSx4  Genitourinary: normal external genitalia  Back: spine midline, no bony tenderness or step-offs; no CVAT B/L  Extremities: WWP, no clubbing or cyanosis; no peripheral edema. Capillary refill <2 sec  Musculoskeletal: NROM x4; no joint swelling, tenderness or erythema  Vascular: 2+ radial, femoral, DP/PT pulses B/L  Dermatologic: skin warm, dry and intact; no rashes, wounds, or scars  Lymphatic: no submandibular or cervical LAD  Neurologic: AAOx3; CNII-XII grossly intact; no focal deficits  Psychiatric: affect and characteristics of appearance, verbalizations, behaviors are appropriate

## 2022-03-11 NOTE — ED PROVIDER NOTE - NSICDXFAMILYHX_GEN_ALL_CORE_FT
FAMILY HISTORY:  Father  Still living? No  Hypercholesterolemia, Age at diagnosis: Age Unknown  Hypertension, Age at diagnosis: Age Unknown  Type 2 diabetes mellitus, Age at diagnosis: Age Unknown

## 2022-03-11 NOTE — ED PROVIDER NOTE - GASTROINTESTINAL, MLM
Abdomen soft, non-tender, no guarding. Mild pain to palpation over the epigastric area and RUQ. No rebound.

## 2022-03-11 NOTE — H&P ADULT - ASSESSMENT
60 yo F with a past medical Hx of HTN, asthma, RA, prior ovarian CA s/p DENNIS with BSO, and umbilical hernia repair, who presents from home due to epigastric pain since yesterday evening. Patient found to be febrile and hypotensive necessitating pressors. Patient transferred to the MICU for further management.    NEURO  AAOx3, no acute intervention    CARDIOVASCULAR  #Hypotension  S/P 3L NS. Lactate negative. Patient currently mentating well with good urine output. Patient started on Levophed and Vasopressin.  - wean pressors as tolerated  - strict I/Os    PULM  CTA B/L , no acute intervention    GI  #Epigastric pain  Hx of GERD. Presents with acute epigastric pain since yesterday evening. Given acuity of pain concern for Boerhaave syndrome.  - IV PPI BID  - keep strictly NPO  - f/u CT abd/pelvis    #Elevated L hemidiaphragm  Chest XR done in ED shows elevated L hemidiaphragm. Concern for Diaphragmatic herniation VS Boerhaave syndrome.  - f/u CT chest/abd/pelvis  - monitor respiratory status    RENAL  Making urine  - strict I/Os    ID  #Septic shock  Patient febrile and hypotensive possibly 2/2 gastroenteritis  - C/W Vancomycin and Zosyn  - f/u CT chest/abd/pelvis    Prophylactic measure  N: NPO  DVT: Lovenox  GI: IV PPI BID  D: MICU  CODE STATUS: Full

## 2022-03-11 NOTE — ED PROVIDER NOTE - PROGRESS NOTE DETAILS
When patient returned from U/S, rectal temp 101.9F, HR 91, BP 78/48. Clinically patient unchanged, pain well controlled, no N/V, mentating well, lungs clear bilaterally. Added lactate, BCx, VBG. Given 3L NS with minimal improvement of BP 85/52. Started broad spectrum abx.

## 2022-03-11 NOTE — CHART NOTE - NSCHARTNOTEFT_GEN_A_CORE
Called to ED to transfer patient to MICU. Found to be hypotensive with MAPs <55. NSR on monitor. Patient complaining of nausea. Given 4mg of Zofran for Nausea. Hemodynamics optimized for transfer with Norepinephrine gtt. Patient to CT but unable to lay flat. Found to be hypoxemic to 88 when laying flat. Seated upright. Attempted X 2 but patient unable to tolerate. Emergent transfer to MICU for optimization. Discussed with Dr. Baxter. Called to ED to transfer patient to MICU. Found to be hypotensive with MAPs <55. NSR on monitor. Patient complaining of nausea. Given 4mg of Zofran for Nausea. Hemodynamics optimized for transfer with Norepinephrine gtt for MAPs>65. Patient to CT but unable to lay flat. Found to be hypoxemic to 88 when laying flat. Seated upright. Attempted X 2 but patient unable to tolerate. Emergent transfer to MICU for optimization. Discussed with Dr. Baxter.

## 2022-03-11 NOTE — ED PROVIDER NOTE - OBJECTIVE STATEMENT
61y Female PMH of ovarian cancer s/p DENNIS with BSO and umbilical hernia s/p repair p/w epigastric pain with N/V since last night. She desceribes the pain as sharp and similar to her GERD pain. The pain is worse when she is lying on her back. Her last bowel movement was this morning. She has not eaten anything since 6pm last night. She denies fevers, chills. 61y Female PMH of ovarian cancer s/p DENNIS with BSO and umbilical hernia s/p repair p/w epigastric pain with N/V since last night. She desceribes the pain as sharp and similar to her GERD pain. The pain is worse when she is lying on her back. Patient reports taking Zantac and her pain improved. Her last bowel movement was this morning. She has not eaten anything since 6pm last night. She denies fevers, chills. 61y Female PMH of ovarian cancer s/p DENNIS with BSO and umbilical hernia s/p repair p/w epigastric pain with N/V since last night. She desceribes the pain as sharp and similar to her GERD pain. The pain comes and goes indiscriminately. The pain is worse when she is lying on her back. Patient reports taking Zantac and her pain improved. Her last bowel movement was this morning. She has not eaten anything since 6pm last night. She denies fevers, chills.

## 2022-03-12 LAB
ALBUMIN SERPL ELPH-MCNC: 2.9 G/DL — LOW (ref 3.3–5)
ALBUMIN SERPL ELPH-MCNC: 3 G/DL — LOW (ref 3.3–5)
ALP SERPL-CCNC: 41 U/L — SIGNIFICANT CHANGE UP (ref 40–120)
ALP SERPL-CCNC: 46 U/L — SIGNIFICANT CHANGE UP (ref 40–120)
ALT FLD-CCNC: 13 U/L — SIGNIFICANT CHANGE UP (ref 10–45)
ALT FLD-CCNC: 35 U/L — SIGNIFICANT CHANGE UP (ref 10–45)
ANION GAP SERPL CALC-SCNC: 8 MMOL/L — SIGNIFICANT CHANGE UP (ref 5–17)
ANION GAP SERPL CALC-SCNC: 9 MMOL/L — SIGNIFICANT CHANGE UP (ref 5–17)
ANISOCYTOSIS BLD QL: SLIGHT — SIGNIFICANT CHANGE UP
AST SERPL-CCNC: 18 U/L — SIGNIFICANT CHANGE UP (ref 10–40)
AST SERPL-CCNC: 37 U/L — SIGNIFICANT CHANGE UP (ref 10–40)
BASOPHILS # BLD AUTO: 0 K/UL — SIGNIFICANT CHANGE UP (ref 0–0.2)
BASOPHILS # BLD AUTO: 0 K/UL — SIGNIFICANT CHANGE UP (ref 0–0.2)
BASOPHILS NFR BLD AUTO: 0 % — SIGNIFICANT CHANGE UP (ref 0–2)
BASOPHILS NFR BLD AUTO: 0 % — SIGNIFICANT CHANGE UP (ref 0–2)
BILIRUB SERPL-MCNC: 0.6 MG/DL — SIGNIFICANT CHANGE UP (ref 0.2–1.2)
BILIRUB SERPL-MCNC: 0.7 MG/DL — SIGNIFICANT CHANGE UP (ref 0.2–1.2)
BLD GP AB SCN SERPL QL: NEGATIVE — SIGNIFICANT CHANGE UP
BLD GP AB SCN SERPL QL: NEGATIVE — SIGNIFICANT CHANGE UP
BUN SERPL-MCNC: 8 MG/DL — SIGNIFICANT CHANGE UP (ref 7–23)
CALCIUM SERPL-MCNC: 7.1 MG/DL — LOW (ref 8.4–10.5)
CALCIUM SERPL-MCNC: 7.1 MG/DL — LOW (ref 8.4–10.5)
CHLORIDE SERPL-SCNC: 107 MMOL/L — SIGNIFICANT CHANGE UP (ref 96–108)
CHLORIDE SERPL-SCNC: 109 MMOL/L — HIGH (ref 96–108)
CK MB CFR SERPL CALC: 1.6 NG/ML — SIGNIFICANT CHANGE UP (ref 0–6.7)
CK SERPL-CCNC: 77 U/L — SIGNIFICANT CHANGE UP (ref 25–170)
CO2 SERPL-SCNC: 21 MMOL/L — LOW (ref 22–31)
CORTIS AM PEAK SERPL-MCNC: 11.06 UG/DL — SIGNIFICANT CHANGE UP (ref 6.02–18.4)
CREAT SERPL-MCNC: 0.68 MG/DL — SIGNIFICANT CHANGE UP (ref 0.5–1.3)
CRP SERPL-MCNC: 86.6 MG/L — HIGH (ref 0–4)
EGFR: 98 ML/MIN/1.73M2 — SIGNIFICANT CHANGE UP
EOSINOPHIL # BLD AUTO: 0.05 K/UL — SIGNIFICANT CHANGE UP (ref 0–0.5)
EOSINOPHIL # BLD AUTO: 0.17 K/UL — SIGNIFICANT CHANGE UP (ref 0–0.5)
EOSINOPHIL NFR BLD AUTO: 0.9 % — SIGNIFICANT CHANGE UP (ref 0–6)
EOSINOPHIL NFR BLD AUTO: 3.5 % — SIGNIFICANT CHANGE UP (ref 0–6)
ERYTHROCYTE [SEDIMENTATION RATE] IN BLOOD: 10 MM/HR — SIGNIFICANT CHANGE UP
GIANT PLATELETS BLD QL SMEAR: PRESENT — SIGNIFICANT CHANGE UP
GIANT PLATELETS BLD QL SMEAR: PRESENT — SIGNIFICANT CHANGE UP
GLUCOSE SERPL-MCNC: 125 MG/DL — HIGH (ref 70–99)
HCT VFR BLD CALC: 39.3 % — SIGNIFICANT CHANGE UP (ref 34.5–45)
HCV AB S/CO SERPL IA: 0.04 S/CO — SIGNIFICANT CHANGE UP
HCV AB SERPL-IMP: SIGNIFICANT CHANGE UP
HGB BLD-MCNC: 12.3 G/DL — SIGNIFICANT CHANGE UP (ref 11.5–15.5)
LACTATE SERPL-SCNC: 0.6 MMOL/L — SIGNIFICANT CHANGE UP (ref 0.5–2)
LYMPHOCYTES # BLD AUTO: 0.42 K/UL — LOW (ref 1–3.3)
LYMPHOCYTES # BLD AUTO: 0.49 K/UL — LOW (ref 1–3.3)
LYMPHOCYTES # BLD AUTO: 8.7 % — LOW (ref 13–44)
LYMPHOCYTES # BLD AUTO: 8.9 % — LOW (ref 13–44)
MAGNESIUM SERPL-MCNC: 2.2 MG/DL — SIGNIFICANT CHANGE UP (ref 1.6–2.6)
MANUAL SMEAR VERIFICATION: SIGNIFICANT CHANGE UP
MANUAL SMEAR VERIFICATION: SIGNIFICANT CHANGE UP
MCHC RBC-ENTMCNC: 31.3 GM/DL — LOW (ref 32–36)
MCHC RBC-ENTMCNC: 31.5 PG — SIGNIFICANT CHANGE UP (ref 27–34)
MCV RBC AUTO: 100.5 FL — HIGH (ref 80–100)
MICROCYTES BLD QL: SLIGHT — SIGNIFICANT CHANGE UP
MONOCYTES # BLD AUTO: 0.29 K/UL — SIGNIFICANT CHANGE UP (ref 0–0.9)
MONOCYTES # BLD AUTO: 0.63 K/UL — SIGNIFICANT CHANGE UP (ref 0–0.9)
MONOCYTES NFR BLD AUTO: 13.3 % — SIGNIFICANT CHANGE UP (ref 2–14)
MONOCYTES NFR BLD AUTO: 5.2 % — SIGNIFICANT CHANGE UP (ref 2–14)
NEUTROPHILS # BLD AUTO: 3.54 K/UL — SIGNIFICANT CHANGE UP (ref 1.8–7.4)
NEUTROPHILS # BLD AUTO: 4.78 K/UL — SIGNIFICANT CHANGE UP (ref 1.8–7.4)
NEUTROPHILS NFR BLD AUTO: 74.3 % — SIGNIFICANT CHANGE UP (ref 43–77)
NEUTROPHILS NFR BLD AUTO: 82.6 % — HIGH (ref 43–77)
NEUTS BAND # BLD: 2.6 % — SIGNIFICANT CHANGE UP (ref 0–8)
NRBC # BLD: 1 /100 — HIGH (ref 0–0)
NRBC # BLD: SIGNIFICANT CHANGE UP /100 WBCS (ref 0–0)
PHOSPHATE SERPL-MCNC: 2.6 MG/DL — SIGNIFICANT CHANGE UP (ref 2.5–4.5)
PLAT MORPH BLD: ABNORMAL
PLAT MORPH BLD: ABNORMAL
PLATELET # BLD AUTO: 174 K/UL — SIGNIFICANT CHANGE UP (ref 150–400)
POIKILOCYTOSIS BLD QL AUTO: SIGNIFICANT CHANGE UP
POTASSIUM SERPL-MCNC: 3 MMOL/L — LOW (ref 3.5–5.3)
POTASSIUM SERPL-MCNC: 4.5 MMOL/L — SIGNIFICANT CHANGE UP (ref 3.5–5.3)
POTASSIUM SERPL-SCNC: 3 MMOL/L — LOW (ref 3.5–5.3)
POTASSIUM SERPL-SCNC: 4.5 MMOL/L — SIGNIFICANT CHANGE UP (ref 3.5–5.3)
PROCALCITONIN SERPL-MCNC: 4.14 NG/ML — HIGH (ref 0.02–0.1)
PROT SERPL-MCNC: 4.7 G/DL — LOW (ref 6–8.3)
PROT SERPL-MCNC: 5.1 G/DL — LOW (ref 6–8.3)
RBC # BLD: 3.91 M/UL — SIGNIFICANT CHANGE UP (ref 3.8–5.2)
RBC # FLD: 12.8 % — SIGNIFICANT CHANGE UP (ref 10.3–14.5)
RBC BLD AUTO: ABNORMAL
RBC BLD AUTO: NORMAL — SIGNIFICANT CHANGE UP
RH IG SCN BLD-IMP: POSITIVE — SIGNIFICANT CHANGE UP
RH IG SCN BLD-IMP: POSITIVE — SIGNIFICANT CHANGE UP
SMUDGE CELLS # BLD: PRESENT — SIGNIFICANT CHANGE UP
SODIUM SERPL-SCNC: 139 MMOL/L — SIGNIFICANT CHANGE UP (ref 135–145)
SODIUM SERPL-SCNC: 140 MMOL/L — SIGNIFICANT CHANGE UP (ref 135–145)
SPHEROCYTES BLD QL SMEAR: SIGNIFICANT CHANGE UP
WBC # BLD: 4.76 K/UL — SIGNIFICANT CHANGE UP (ref 3.8–10.5)
WBC # FLD AUTO: 4.76 K/UL — SIGNIFICANT CHANGE UP (ref 3.8–10.5)

## 2022-03-12 PROCEDURE — 71045 X-RAY EXAM CHEST 1 VIEW: CPT | Mod: 26

## 2022-03-12 PROCEDURE — 74177 CT ABD & PELVIS W/CONTRAST: CPT | Mod: 26

## 2022-03-12 PROCEDURE — 99221 1ST HOSP IP/OBS SF/LOW 40: CPT

## 2022-03-12 PROCEDURE — 36620 INSERTION CATHETER ARTERY: CPT

## 2022-03-12 PROCEDURE — 51702 INSERT TEMP BLADDER CATH: CPT | Mod: 59

## 2022-03-12 PROCEDURE — 99291 CRITICAL CARE FIRST HOUR: CPT

## 2022-03-12 PROCEDURE — 93306 TTE W/DOPPLER COMPLETE: CPT | Mod: 26

## 2022-03-12 PROCEDURE — 36010 PLACE CATHETER IN VEIN: CPT

## 2022-03-12 PROCEDURE — 78226 HEPATOBILIARY SYSTEM IMAGING: CPT | Mod: 26

## 2022-03-12 PROCEDURE — 36600 WITHDRAWAL OF ARTERIAL BLOOD: CPT | Mod: 59

## 2022-03-12 PROCEDURE — 71275 CT ANGIOGRAPHY CHEST: CPT | Mod: 26

## 2022-03-12 PROCEDURE — 76937 US GUIDE VASCULAR ACCESS: CPT | Mod: 26

## 2022-03-12 RX ORDER — SODIUM CHLORIDE 9 MG/ML
500 INJECTION, SOLUTION INTRAVENOUS ONCE
Refills: 0 | Status: COMPLETED | OUTPATIENT
Start: 2022-03-12 | End: 2022-03-12

## 2022-03-12 RX ORDER — VASOPRESSIN 20 [USP'U]/ML
0.04 INJECTION INTRAVENOUS
Qty: 50 | Refills: 0 | Status: DISCONTINUED | OUTPATIENT
Start: 2022-03-12 | End: 2022-03-12

## 2022-03-12 RX ORDER — POTASSIUM CHLORIDE 20 MEQ
20 PACKET (EA) ORAL
Refills: 0 | Status: COMPLETED | OUTPATIENT
Start: 2022-03-12 | End: 2022-03-12

## 2022-03-12 RX ORDER — MORPHINE SULFATE 50 MG/1
1 CAPSULE, EXTENDED RELEASE ORAL ONCE
Refills: 0 | Status: DISCONTINUED | OUTPATIENT
Start: 2022-03-12 | End: 2022-03-12

## 2022-03-12 RX ORDER — TRAMADOL HYDROCHLORIDE 50 MG/1
25 TABLET ORAL EVERY 6 HOURS
Refills: 0 | Status: DISCONTINUED | OUTPATIENT
Start: 2022-03-12 | End: 2022-03-14

## 2022-03-12 RX ORDER — ENOXAPARIN SODIUM 100 MG/ML
40 INJECTION SUBCUTANEOUS EVERY 24 HOURS
Refills: 0 | Status: DISCONTINUED | OUTPATIENT
Start: 2022-03-12 | End: 2022-03-14

## 2022-03-12 RX ORDER — ACETAMINOPHEN 500 MG
650 TABLET ORAL EVERY 6 HOURS
Refills: 0 | Status: DISCONTINUED | OUTPATIENT
Start: 2022-03-12 | End: 2022-03-12

## 2022-03-12 RX ORDER — ACETAMINOPHEN 500 MG
650 TABLET ORAL EVERY 6 HOURS
Refills: 0 | Status: DISCONTINUED | OUTPATIENT
Start: 2022-03-12 | End: 2022-03-14

## 2022-03-12 RX ORDER — CALCIUM GLUCONATE 100 MG/ML
1 VIAL (ML) INTRAVENOUS ONCE
Refills: 0 | Status: COMPLETED | OUTPATIENT
Start: 2022-03-12 | End: 2022-03-12

## 2022-03-12 RX ADMIN — CHLORHEXIDINE GLUCONATE 1 APPLICATION(S): 213 SOLUTION TOPICAL at 06:34

## 2022-03-12 RX ADMIN — Medication 250 MILLIGRAM(S): at 09:02

## 2022-03-12 RX ADMIN — PIPERACILLIN AND TAZOBACTAM 200 GRAM(S): 4; .5 INJECTION, POWDER, LYOPHILIZED, FOR SOLUTION INTRAVENOUS at 07:55

## 2022-03-12 RX ADMIN — MORPHINE SULFATE 1 MILLIGRAM(S): 50 CAPSULE, EXTENDED RELEASE ORAL at 06:31

## 2022-03-12 RX ADMIN — MORPHINE SULFATE 1 MILLIGRAM(S): 50 CAPSULE, EXTENDED RELEASE ORAL at 07:19

## 2022-03-12 RX ADMIN — TRAMADOL HYDROCHLORIDE 25 MILLIGRAM(S): 50 TABLET ORAL at 22:04

## 2022-03-12 RX ADMIN — Medication 650 MILLIGRAM(S): at 18:04

## 2022-03-12 RX ADMIN — Medication 650 MILLIGRAM(S): at 13:08

## 2022-03-12 RX ADMIN — Medication 100 GRAM(S): at 07:16

## 2022-03-12 RX ADMIN — MORPHINE SULFATE 1 MILLIGRAM(S): 50 CAPSULE, EXTENDED RELEASE ORAL at 00:29

## 2022-03-12 RX ADMIN — PIPERACILLIN AND TAZOBACTAM 200 GRAM(S): 4; .5 INJECTION, POWDER, LYOPHILIZED, FOR SOLUTION INTRAVENOUS at 19:53

## 2022-03-12 RX ADMIN — Medication 650 MILLIGRAM(S): at 19:55

## 2022-03-12 RX ADMIN — Medication 5.63 MICROGRAM(S)/KG/MIN: at 13:57

## 2022-03-12 RX ADMIN — ENOXAPARIN SODIUM 40 MILLIGRAM(S): 100 INJECTION SUBCUTANEOUS at 06:32

## 2022-03-12 RX ADMIN — PIPERACILLIN AND TAZOBACTAM 200 GRAM(S): 4; .5 INJECTION, POWDER, LYOPHILIZED, FOR SOLUTION INTRAVENOUS at 02:50

## 2022-03-12 RX ADMIN — Medication 50 MILLIEQUIVALENT(S): at 04:00

## 2022-03-12 RX ADMIN — Medication 50 MILLIEQUIVALENT(S): at 01:00

## 2022-03-12 RX ADMIN — PANTOPRAZOLE SODIUM 40 MILLIGRAM(S): 20 TABLET, DELAYED RELEASE ORAL at 18:04

## 2022-03-12 RX ADMIN — PIPERACILLIN AND TAZOBACTAM 200 GRAM(S): 4; .5 INJECTION, POWDER, LYOPHILIZED, FOR SOLUTION INTRAVENOUS at 13:56

## 2022-03-12 RX ADMIN — Medication 50 MILLIEQUIVALENT(S): at 02:00

## 2022-03-12 RX ADMIN — MORPHINE SULFATE 1 MILLIGRAM(S): 50 CAPSULE, EXTENDED RELEASE ORAL at 00:30

## 2022-03-12 RX ADMIN — Medication 650 MILLIGRAM(S): at 12:18

## 2022-03-12 RX ADMIN — SODIUM CHLORIDE 3000 MILLILITER(S): 9 INJECTION, SOLUTION INTRAVENOUS at 18:24

## 2022-03-12 RX ADMIN — MIDAZOLAM HYDROCHLORIDE 1 MILLIGRAM(S): 1 INJECTION, SOLUTION INTRAMUSCULAR; INTRAVENOUS at 00:29

## 2022-03-12 RX ADMIN — PANTOPRAZOLE SODIUM 40 MILLIGRAM(S): 20 TABLET, DELAYED RELEASE ORAL at 06:32

## 2022-03-12 RX ADMIN — VASOPRESSIN 2.4 UNIT(S)/MIN: 20 INJECTION INTRAVENOUS at 02:51

## 2022-03-12 RX ADMIN — TRAMADOL HYDROCHLORIDE 25 MILLIGRAM(S): 50 TABLET ORAL at 22:03

## 2022-03-12 NOTE — PROCEDURE NOTE - NSICDXPROCEDURE_GEN_ALL_CORE_FT
PROCEDURES:  Insertion of arterial line with imaging guidance 11-Mar-2022 23:07:54  Efra Dubon  
PROCEDURES:  Insertion of arterial line with imaging guidance 11-Mar-2022 23:07:54  Efra Dubon  US Doppler guided insertion of central venous catheter 12-Mar-2022 00:38:12  Efra Dubon  Insertion of Rivers catheter 12-Mar-2022 00:47:11  Efra Dubon

## 2022-03-12 NOTE — PROGRESS NOTE ADULT - SUBJECTIVE AND OBJECTIVE BOX
I saw patient in ED las PM    Patient known to me x years (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 Patient reports having acute epigastric pain with nausea and NBNB vomiting after eating takeout Chinese food Thursday evening. Patient attempted OTC Mylanta, which improved her abdominal pain mildly  initially, but pain recurred and was so sever I advised her to go to ED. Pain Waxes and wanes. Admitted to ICU hypotensive: shock    ED Course  Vitals: T 101.8 BP 73/44 HR 81 SaO2 96 RA  Labs: WBC 7.76, Hb 15.5, platelets 238, Na 145, K 3.2, HCO3- 30, creatinine 0.72, COVID-19 negative   Imaging: Chest X Ray shows no acute cardiopulmonary, elevated L hemidiaphragm  Medication: 3L NS, IV Tylenol 1g, IV Zofran 4mg, Vancomycin 1g, Zosyn 3.375g     Patient transferred to the MICU for management of shock (11 Mar 2022 23:52)      REVIEW OF SYSTEMS:  Constitutional: No fever, no weight loss  ENMT:  No difficulty hearing, tinnitus, vertigo  Respiratory: No cough, wheezing, chills or hemoptysis  Cardiovascular: No chest pain, palpitations, dizziness or leg swelling  Gastrointestinal: + epigastric pain radiating to RUQ. No nausea, vomiting or hematemesis; No diarrhea or constipation. No melena or hematochezia.  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    Type 2 diabetes mellitus (Father)  Father, .    Hypercholesterolemia (Father)  Father, .        SOCIAL HISTORY:  Smoking Status: [ ] Current, [ ] Former, [ ] Never  Pack Years:    MEDICATIONS:  MEDICATIONS  (STANDING):  chlorhexidine 2% Cloths 1 Application(s) Topical <User Schedule>  enoxaparin Injectable 40 milliGRAM(s) SubCutaneous every 24 hours  norepinephrine Infusion 0.05 MICROgram(s)/kG/Min (5.63 mL/Hr) IV Continuous <Continuous>  pantoprazole  Injectable 40 milliGRAM(s) IV Push every 12 hours  piperacillin/tazobactam IVPB.. 3.375 Gram(s) IV Intermittent every 6 hours    MEDICATIONS  (PRN):  acetaminophen     Tablet .. 650 milliGRAM(s) Oral every 6 hours PRN Mild Pain (1 - 3)  aluminum hydroxide/magnesium hydroxide/simethicone Suspension 30 milliLiter(s) Oral every 4 hours PRN Dyspepsia      Allergies    No Known Allergies    Intolerances        Vital Signs Last 24 Hrs  T(C): 36.9 (12 Mar 2022 09:14), Max: 38.8 (11 Mar 2022 18:57)  T(F): 98.5 (12 Mar 2022 09:14), Max: 101.9 (11 Mar 2022 18:57)  HR: 65 (12 Mar 2022 14:00) (59 - 91)  BP: 115/57 (12 Mar 2022 14:00) (73/44 - 141/88)  BP(mean): 78 (12 Mar 2022 14:00) (64 - 109)  RR: 22 (12 Mar 2022 14:00) (16 - 48)  SpO2: 99% (12 Mar 2022 14:00) (90% - 100%)     @ 07: @ 07:00  --------------------------------------------------------  IN: 859 mL / OUT: 705 mL / NET: 154 mL     @ 06:01  -   @ 14:21  --------------------------------------------------------  IN: 445.4 mL / OUT: 575 mL / NET: -129.6 mL          PHYSICAL EXAM:    General: Well developed; well nourished; c/o epigastric pain 7/10  HEENT: MMM, conjunctiva and sclera clear  Lungs: clear  Heart: regular  Gastrointestinal: Soft, +/-tender non-distended; Normal bowel sounds; No  guarding (had Hernandez sign when I saw her yesterday, none currently  Extremities: Normal range of motion, No clubbing, cyanosis or edema  Neurological: Alert and oriented x3  Skin: Warm and dry. No obvious rash  Ext: no edema      LABS:                        12.3   4.76  )-----------( 174      ( 12 Mar 2022 05:54 )             39.3     -    139  |  109<H>  |  8   ----------------------------<  125<H>  4.5   |  21<L>  |  0.68    Ca    7.1<L>      12 Mar 2022 05:54  Phos  2.6       Mg     2.2         TPro  5.1<L>  /  Alb  3.0<L>  /  TBili  0.6  /  DBili  x   /  AST  37  /  ALT  35  /  AlkPhos  46        Culture Results:   No growth at 12 hours ( @ 21:29)  Culture Results:   No growth at 12 hours ( @ 21:29)  Culture Results:   Culture in progress ( @ 17:58)      RADIOLOGY & ADDITIONAL STUDIES:   < from: CT Abdomen and Pelvis w/ IV Cont (22 @ 02:15) >  1. There is severe extensive inflammatory thickening of the wall of   numerous contiguous loops of proximal and mid small bowel, compatible   with severe enteritis which could be due to infection, inflammatory bowel   disease, or ischemia. No specific ancillary findings of bowel ischemia.  2. Small volume ascites.  3. Gallbladder is markedly distended and there is dependent   cholelithiasis. No wall thickening or pericholecystic inflammation. Small   volume of pericholecystic fluid in the setting periportal edema and IVC   distention is likely related to patient's fluid status/fluid overload   rather than cholecystitis.    Thank you for allowing us to participate in the care of your patient.  Dictated and Authenticated by: Orlin Madrigal MD  2022 2:53 AM Eastern Time ([place] & [place])    < end of copied text >

## 2022-03-12 NOTE — PROCEDURE NOTE - NSCOMPLICATION_GEN_A_CORE
Problem: RESPIRATORY - ADULT  Goal: Achieves optimal ventilation and oxygenation  INTERVENTIONS:  - Assess for changes in respiratory status  - Assess for changes in mentation and behavior  - Position to facilitate oxygenation and minimize respiratory effo
no complications

## 2022-03-12 NOTE — PATIENT PROFILE ADULT - HOW PATIENT ADDRESSED, PROFILE
Sudafed for one week or longer.  Don't use 24 hour formulation.  Afrin nasal spray for no longer than 3-4 days.  Flonase if prescribed.       Tawny

## 2022-03-12 NOTE — PROCEDURE NOTE - NSPOSTCAREGUIDE_GEN_A_CORE
Verbal/written post procedure instructions were given to patient/caregiver/Instructed patient/caregiver to follow-up with primary care physician/Instructed patient/caregiver regarding signs and symptoms of infection
Verbal/written post procedure instructions were given to patient/caregiver/Instructed patient/caregiver to follow-up with primary care physician/Instructed patient/caregiver regarding signs and symptoms of infection/Keep the cast/splint/dressing clean and dry/Care for catheter as per unit/ICU protocols
Verbal/written post procedure instructions were given to patient/caregiver/Instructed patient/caregiver to follow-up with primary care physician/Instructed patient/caregiver regarding signs and symptoms of infection/Keep the cast/splint/dressing clean and dry/Care for catheter as per unit/ICU protocols

## 2022-03-12 NOTE — PROCEDURE NOTE - ADDITIONAL PROCEDURE DETAILS
+Lung siding on POCUS before and after procedure. Wire visualized in vessel in short and long axis prior to dilation. Line confirmed via subxiphoid view flush test on ultrasound. Follow up CXR.

## 2022-03-12 NOTE — PROCEDURE NOTE - NSINDICATIONS_GEN_A_CORE
To facilitate pressors./critical illness/emergency venous access
arterial puncture to obtain ABG's/critical patient/monitoring purposes
critical patient/strict intake/output during critical illness

## 2022-03-12 NOTE — PROGRESS NOTE ADULT - ASSESSMENT
severe enteritis ? infectious ? ischemic  gallbladder distension, gallstones  awaiting HIDA  await cultures  npo  antibiotics pending cultures  supportive  prn pain Rx

## 2022-03-12 NOTE — PROCEDURE NOTE - NSPROCDETAILS_GEN_ALL_CORE
Ultrasound Guidance/location identified, draped/prepped, sterile technique used, needle inserted/introduced/positive blood return obtained via catheter/connected to a pressurized flush line/sutured in place/hemostasis with direct pressure, dressing applied/Seldinger technique/all materials/supplies accounted for at end of procedure
guidewire recovered/lumen(s) aspirated and flushed/sterile dressing applied/sterile technique, catheter placed/ultrasound guidance with use of sterile gel and probe cove
sterile technique, indwelling urinary device inserted/prior to placement, an active physician order for the placement of a urinary catheter was verified/a urinary catheter insertion kit was used for all insertion materials

## 2022-03-12 NOTE — PROGRESS NOTE ADULT - ASSESSMENT
62 yo F with a past medical Hx of HTN, asthma, RA, prior ovarian CA s/p DENNIS with BSO, and umbilical hernia repair, who presents from home due to epigastric pain since yesterday evening. Patient found to be febrile and hypotensive necessitating pressors. Patient transferred to the MICU for further management.    NEURO  AAOx3, no acute intervention    CARDIOVASCULAR  #Hypotension  S/P 3L NS. Lactate negative. Patient currently mentating well with good urine output. Patient started on Levophed and Vasopressin.  - wean pressors as tolerated  - strict I/Os      #Hx of Hypertension  At home on losartan.  - holding in the setting of sepsis    PULM  CTA B/L , no acute intervention    GI  #Epigastric pain  Hx of GERD. Presents with acute epigastric pain since yesterday evening. Given acuity of pain concern for Boerhaave syndrome 2/2 vomiting and Gallbladder pathology given imaging findings and colicky abdominal pain.   - US abdomen 3/11: Small stones and a moderate amount of sludge within the gallbladder.  - CT Abdomin 3/12: There is severe extensive inflammatory thickening of the wall of numerous contiguous loops of proximal and mid small bowel, compatible with severe enteritis which could be due to infection, inflammatory bowel disease, or ischemia. No specific ancillary findings of bowel ischemia.  Small volume ascites. Gallbladder is markedly distended and there is dependent cholelithiasis. No wall thickening or pericholecystic inflammation.   - No concerns for acute cholecystitis, cholelithiasis without concern for cholecystitis or choledocholithiasis most likely pain secondary to severe enteritis. No concerns for ischemia due to negative lactate.  - IV PPI BID  - keep strictly NPO  - Surgery consulted, recs appreciated  -GI consulted, rec appreciated  - F/u HIDA scan   - c/w Zosyn    #Nausea   - Prolonged  on 3/12  - consider tigan if nauseous     #Elevated L hemidiaphragm  Chest XR done in ED shows elevated L hemidiaphragm. Concern for Diaphragmatic herniation VS Boerhaave syndrome.  - monitor respiratory status    RENAL  Making urine  - strict I/Os    ID  #Septic shock  Patient febrile and hypotensive possibly 2/2 gastroenteritis  - d/c Vancomycin  - c/w Zosyn   management as above     RHEUM  #RA  At home on plaquenil  - holding in the setting of septic shock     Prophylactic measure  N: NPO  DVT: Lovenox 40 mEq daily   GI: Protonix IV BID  D: MICU  CODE STATUS: Full 60 yo F with a past medical Hx of HTN, asthma, RA, prior ovarian CA s/p DENNIS with BSO, and umbilical hernia repair, who presents from home due to epigastric pain since yesterday evening. Patient found to be febrile and hypotensive necessitating pressors. Patient transferred to the MICU for further management.    NEURO  AAOx3, no acute intervention    CARDIOVASCULAR  #Hypotension  S/P 3L NS. Lactate negative. Patient currently mentating well with good urine output. Patient started on Levophed and Vasopressin.  -TTE 3/12/22: LVEF 75%  - wean pressors as tolerated  - strict I/Os    #Hx of Hypertension  At home on losartan.  - holding in the setting of sepsis    PULM  CTA B/L , no acute intervention    GI  #Epigastric pain  Hx of GERD. Presents with acute epigastric pain since yesterday evening. Given acuity of pain concern for Boerhaave syndrome 2/2 vomiting and Gallbladder pathology given imaging findings and colicky abdominal pain.   - US abdomen 3/11: Small stones and a moderate amount of sludge within the gallbladder.  - CT Abdomin 3/12: There is severe extensive inflammatory thickening of the wall of numerous contiguous loops of proximal and mid small bowel, compatible with severe enteritis which could be due to infection, inflammatory bowel disease, or ischemia. No specific ancillary findings of bowel ischemia.  Small volume ascites. Gallbladder is markedly distended and there is dependent cholelithiasis. No wall thickening or pericholecystic inflammation.   - No concerns for acute cholecystitis, cholelithiasis without concern for cholecystitis or choledocholithiasis most likely pain secondary to severe enteritis. No concerns for ischemia due to negative lactate.  - IV PPI BID  - keep strictly NPO  - Surgery consulted, recs appreciated  -GI consulted, rec appreciated  - F/u HIDA scan   - c/w Zosyn    #Nausea   - Prolonged  on 3/12  - consider tigan if nauseous     #Elevated L hemidiaphragm  Chest XR done in ED shows elevated L hemidiaphragm. Concern for Diaphragmatic herniation VS Boerhaave syndrome.  - monitor respiratory status    RENAL  Making urine  - strict I/Os    ID  #Septic shock  Patient febrile and hypotensive possibly 2/2 gastroenteritis  - d/c Vancomycin  - c/w Zosyn   management as above     RHEUM  #RA  At home on plaquenil  - holding in the setting of septic shock     Prophylactic measure  N: NPO  DVT: Lovenox 40 mEq daily   GI: Protonix IV BID  D: MICU  CODE STATUS: Full 60 yo F with a past medical Hx of HTN, asthma, RA, prior ovarian CA s/p DENNIS with BSO, and umbilical hernia repair, who presents from home due to epigastric pain since yesterday evening. Patient found to be febrile and hypotensive necessitating pressors. Patient transferred to the MICU for further management.    NEURO  AAOx3, no acute intervention    CARDIOVASCULAR  #Hypotension  S/P 3L NS. Lactate negative. Patient currently mentating well with good urine output. Patient started on Levophed and Vasopressin.  -TTE 3/12/22: LVEF 75%  - wean pressors as tolerated  - strict I/Os    #Hx of Hypertension  At home on losartan.  - holding in the setting of sepsis    PULM  CTA B/L , no acute intervention    GI  #Epigastric pain  Hx of GERD. Presents with acute epigastric pain since yesterday evening. Given acuity of pain concern for Boerhaave syndrome 2/2 vomiting and Gallbladder pathology given imaging findings and colicky abdominal pain.   - US abdomen 3/11: Small stones and a moderate amount of sludge within the gallbladder.  - CT Abdomin 3/12: There is severe extensive inflammatory thickening of the wall of numerous contiguous loops of proximal and mid small bowel, compatible with severe enteritis which could be due to infection, inflammatory bowel disease, or ischemia. No specific ancillary findings of bowel ischemia.  Small volume ascites. Gallbladder is markedly distended and there is dependent cholelithiasis. No wall thickening or pericholecystic inflammation.   - No concerns for acute cholecystitis, cholelithiasis without concern for cholecystitis or choledocholithiasis most likely pain secondary to severe enteritis. No concerns for ischemia due to negative lactate.  - IV PPI BID  - keep strictly NPO  - Surgery consulted, recs appreciated  - GI consulted, rec appreciated  - F/u HIDA scan   - c/w Zosyn    #Nausea   - Prolonged  on 3/12  - consider tigan if nauseous     #Elevated L hemidiaphragm  Chest XR done in ED shows elevated L hemidiaphragm. Concern for Diaphragmatic herniation VS Boerhaave syndrome.  - monitor respiratory status    RENAL  #Hypocalcemia  - f/u PTH  - Ionized calcium  - Vit D 25  - Vit 1, 25    #Making urine  - strict I/Os    ID  #Septic shock  Patient febrile and hypotensive possibly 2/2 gastroenteritis  - d/c Vancomycin  - c/w Zosyn  - management as above     RHEUM  #RA  At home on plaquenil  - holding in the setting of septic shock     Prophylactic measure  N: NPO  DVT: Lovenox 40 mEq daily   GI: Protonix IV BID  D: MICU  CODE STATUS: Full 60 yo F with a past medical Hx of HTN, asthma, RA, prior ovarian CA s/p DENNIS with BSO, and umbilical hernia repair, who presents from home due to epigastric pain with emesis since yesterday evening. Patient found to be in septic shock necessitating pressors. Patient transferred to the MICU for further management.    NEURO  AAOx3, no acute intervention    CARDIOVASCULAR  #Hypotension  S/P 3L NS. Lactate negative. Patient currently mentating well with good urine output. Patient started on Levophed and Vasopressin.  -TTE 3/12/22: LVEF 75%  - wean pressors as tolerated  - strict I/Os    #Hx of Hypertension  At home on losartan.  - holding in the setting of sepsis    PULM  CTA B/L , no acute intervention    GI  #Enteritis  Hx of GERD. Presents with acute epigastric pain since yesterday evening. Given acuity of pain concern for Boerhaave syndrome 2/2 vomiting and Gallbladder pathology given imaging findings and colicky abdominal pain.   - US abdomen 3/11: Small stones and a moderate amount of sludge within the gallbladder.  - CT Abdomin 3/12: There is severe extensive inflammatory thickening of the wall of numerous contiguous loops of proximal and mid small bowel, compatible with severe enteritis which could be due to infection, inflammatory bowel disease, or ischemia. No specific ancillary findings of bowel ischemia.  Small volume ascites. Gallbladder is markedly distended and there is dependent cholelithiasis. No wall thickening or pericholecystic inflammation.   - No concerns for acute cholecystitis, cholelithiasis without concern for cholecystitis or choledocholithiasis most likely pain secondary to severe enteritis. No concerns for ischemia due to negative lactate.  - IV PPI BID  - keep strictly NPO  - Surgery consulted, recs appreciated > no acute interventions  - GI consulted, rec appreciated  - F/u HIDA scan   - c/w Zosyn 3.375 g q6h  - Follow-up blood cultures    #Nausea   - Prolonged  on 3/12  - consider tigan if nauseous     #Elevated L hemidiaphragm  Chest XR done in ED shows elevated L hemidiaphragm. Concern for Diaphragmatic herniation VS Boerhaave syndrome.  - monitor respiratory status    RENAL  #Hypocalcemia  - f/u PTH  - Ionized calcium  - Vit D 25  - Vit 1, 25    #Making urine  - strict I/Os    ID  #Septic shock 2/2 enteritis  Patient febrile and hypotensive possibly 2/2 gastroenteritis  - d/c Vancomycin  - c/w Zosyn  - management as above     RHEUM  #RA  At home on plaquenil  - holding in the setting of septic shock     Prophylactic measure  N: NPO  DVT: Lovenox 40 mEq daily   GI: Protonix IV BID  D: MICU  CODE STATUS: Full 60 yo F with a past medical Hx of HTN, asthma, RA, prior ovarian CA s/p DENNIS with BSO, and umbilical hernia repair, who presents from home due to epigastric pain with emesis, found to be in septic shock 2/2 gastroenteritis, on Abx and pressors    NEURO  AAOx3, no acute intervention    CARDIOVASCULAR  #Shock  S/P 3L NS. Lactate negative. Patient currently mentating well with good urine output. Patient started on Levophed and Vasopressin.  -TTE 3/12/22: LVEF 75%  - wean pressors as tolerated  - strict I/Os    #Hx of Hypertension  At home on losartan.  - holding in the setting of sepsis    PULM  CTA B/L , no acute intervention    GI  #Enteritis  Hx of GERD. Presents with acute epigastric pain since yesterday evening. Given acuity of pain concern for Boerhaave syndrome 2/2 vomiting and Gallbladder pathology given imaging findings and colicky abdominal pain.   - US abdomen 3/11: Small stones and a moderate amount of sludge within the gallbladder.  - CT Abdomin 3/12: There is severe extensive inflammatory thickening of the wall of numerous contiguous loops of proximal and mid small bowel, compatible with severe enteritis which could be due to infection, inflammatory bowel disease, or ischemia. No specific ancillary findings of bowel ischemia.  Small volume ascites. Gallbladder is markedly distended and there is dependent cholelithiasis. No wall thickening or pericholecystic inflammation.   - No concerns for acute cholecystitis, cholelithiasis without concern for cholecystitis or choledocholithiasis most likely pain secondary to severe enteritis. No concerns for ischemia due to negative lactate.  - IV PPI BID  - keep strictly NPO  - Surgery consulted, recs appreciated > no acute interventions  - GI consulted, rec appreciated  - F/u HIDA scan   - c/w Zosyn 3.375 g q6h  - Follow-up blood cultures    #Nausea   - Prolonged  on 3/12  - consider tigan if nauseous     #Elevated L hemidiaphragm  Chest XR done in ED shows elevated L hemidiaphragm. Concern for Diaphragmatic herniation VS Boerhaave syndrome.  - monitor respiratory status    RENAL  #Hypocalcemia  - f/u PTH  - Ionized calcium  - Vit D 25  - Vit 1, 25    #Making urine  - strict I/Os    ID  #Septic shock 2/2 enteritis  Patient febrile and hypotensive possibly 2/2 gastroenteritis  - d/c Vancomycin  - c/w Zosyn  - management as above     RHEUM  #RA  At home on plaquenil  - holding in the setting of septic shock     Prophylactic measure  N: NPO  DVT: Lovenox 40 mEq daily   GI: Protonix IV BID  D: MICU  CODE STATUS: Full

## 2022-03-12 NOTE — PROGRESS NOTE ADULT - SUBJECTIVE AND OBJECTIVE BOX
INTERVAL HPI/OVERNIGHT EVENTS:    SUBJECTIVE: Patient seen and examined at bedside.     OBJECTIVE:    VITAL SIGNS:  ICU Vital Signs Last 24 Hrs  T(C): 37.2 (11 Mar 2022 23:29), Max: 38.8 (11 Mar 2022 18:57)  T(F): 99 (11 Mar 2022 23:29), Max: 101.9 (11 Mar 2022 18:57)  HR: 62 (12 Mar 2022 05:00) (62 - 91)  BP: 92/54 (12 Mar 2022 03:30) (73/44 - 119/58)  BP(mean): 67 (12 Mar 2022 03:30) (64 - 83)  ABP: 135/68 (12 Mar 2022 05:00) (75/47 - 139/69)  ABP(mean): 93 (12 Mar 2022 05:00) (52 - 95)  RR: 37 (12 Mar 2022 05:00) (16 - 37)  SpO2: 98% (12 Mar 2022 05:00) (90% - 99%)        03-11 @ 07:01  -  03-12 @ 06:39  --------------------------------------------------------  IN: 159.4 mL / OUT: 480 mL / NET: -320.6 mL      CAPILLARY BLOOD GLUCOSE          PHYSICAL EXAM:    Constitutional: NAD, well-groomed, well-developed  HEENT: PERRLA, EOMI, Normal Hearing, MMM  Neck: No LAD, No JVD  Back: Normal spine flexure, No CVA tenderness  Respiratory: CTAB   Cardiovascular: S1 and S2, RRR, no M/G/R  Gastrointestinal: BS+, soft, NT/ND  Extremities: No peripheral edema  Vascular: 2+ peripheral pulses  Neurological: A/O x 3, no focal deficits  Psychiatric: Normal mood, normal affect  Musculoskeletal: 5/5 strength b/l upper and lower extremities  Skin: No rashes    MEDICATIONS:  MEDICATIONS  (STANDING):  chlorhexidine 2% Cloths 1 Application(s) Topical <User Schedule>  enoxaparin Injectable 40 milliGRAM(s) SubCutaneous every 24 hours  norepinephrine Infusion 0.05 MICROgram(s)/kG/Min (5.63 mL/Hr) IV Continuous <Continuous>  pantoprazole  Injectable 40 milliGRAM(s) IV Push every 12 hours  piperacillin/tazobactam IVPB.. 3.375 Gram(s) IV Intermittent every 6 hours  vancomycin  IVPB 1000 milliGRAM(s) IV Intermittent every 12 hours  vasopressin Infusion 0.04 Unit(s)/Min (2.4 mL/Hr) IV Continuous <Continuous>    MEDICATIONS  (PRN):      ALLERGIES:  Allergies    No Known Allergies    Intolerances        LABS:                        12.3   4.76  )-----------( 174      ( 12 Mar 2022 05:54 )             39.3     03-12    139  |  109<H>  |  8   ----------------------------<  125<H>  x    |  21<L>  |  0.68    Ca    7.1<L>      12 Mar 2022 05:54  Phos  2.6     03-12  Mg     2.2     -12    TPro  5.1<L>  /  Alb  3.0<L>  /  TBili  0.6  /  DBili  x   /  AST  37  /  ALT  35  /  AlkPhos  46  03-12    PT/INR - ( 11 Mar 2022 15:20 )   PT: 11.8 sec;   INR: 0.99          PTT - ( 11 Mar 2022 15:20 )  PTT:30.2 sec  Urinalysis Basic - ( 11 Mar 2022 16:52 )    Color: Yellow / Appearance: Clear / S.015 / pH: x  Gluc: x / Ketone: NEGATIVE  / Bili: Negative / Urobili: 0.2 E.U./dL   Blood: x / Protein: Trace mg/dL / Nitrite: NEGATIVE   Leuk Esterase: NEGATIVE / RBC: < 5 /HPF / WBC 5-10 /HPF   Sq Epi: x / Non Sq Epi: 0-5 /HPF / Bacteria: Many /HPF        RADIOLOGY & ADDITIONAL TESTS: Reviewed. INTERVAL HPI/OVERNIGHT EVENTS: US abdomen 3/11: Echogenic renal parenchyma bilaterally, consistent with medical renal disease. Small stones and a moderate amount of sludge within the gallbladder. New 1.2 cm echogenic mass left kidney. Recommend further evaluation with either CT scan or MRI with renal mass protocol. Trace ascites. Possible new small cystic pancreatic lesion or ascites adjacent to pancreas. This may also be assessed on CT scan or MRI.    SUBJECTIVE: Patient seen and examined at bedside. Patient with 7/10 intermittent epigastric and RUQ pain, and denies nausea, vomiting, diarrhea. Patient also reports SOB while lying flat. No other complaints, rest of ROS negative.     OBJECTIVE:    VITAL SIGNS:  ICU Vital Signs Last 24 Hrs  T(C): 37.2 (11 Mar 2022 23:29), Max: 38.8 (11 Mar 2022 18:57)  T(F): 99 (11 Mar 2022 23:29), Max: 101.9 (11 Mar 2022 18:57)  HR: 62 (12 Mar 2022 05:00) (62 - 91)  BP: 92/54 (12 Mar 2022 03:30) (73/44 - 119/58)  BP(mean): 67 (12 Mar 2022 03:30) (64 - 83)  ABP: 135/68 (12 Mar 2022 05:00) (75/47 - 139/69)  ABP(mean): 93 (12 Mar 2022 05:00) (52 - 95)  RR: 37 (12 Mar 2022 05:00) (16 - 37)  SpO2: 98% (12 Mar 2022 05:00) (90% - 99%)        -11 @ 07:01  -  03-12 @ 06:39  --------------------------------------------------------  IN: 159.4 mL / OUT: 480 mL / NET: -320.6 mL      CAPILLARY BLOOD GLUCOSE          PHYSICAL EXAM:    Constitutional: mild distress due to pain, well-groomed, well-developed  HEENT: PERRLA, EOMI, Normal Hearing, MMM  Neck: No LAD, No JVD  Respiratory: CTA BL  Cardiovascular: S1 and S2, RRR, no M/G/R  Gastrointestinal: BS+, Hernandez positive, and epigastric pain on light palpation   Extremities: trace peripheral edema  Vascular: 2+ peripheral pulses  Neurological: A/O x 3, no focal deficits  Psychiatric: Normal mood, normal affect  Musculoskeletal: 5/5 strength b/l upper and lower extremities  Skin: No rashes    MEDICATIONS:  MEDICATIONS  (STANDING):  chlorhexidine 2% Cloths 1 Application(s) Topical <User Schedule>  enoxaparin Injectable 40 milliGRAM(s) SubCutaneous every 24 hours  norepinephrine Infusion 0.05 MICROgram(s)/kG/Min (5.63 mL/Hr) IV Continuous <Continuous>  pantoprazole  Injectable 40 milliGRAM(s) IV Push every 12 hours  piperacillin/tazobactam IVPB.. 3.375 Gram(s) IV Intermittent every 6 hours  vancomycin  IVPB 1000 milliGRAM(s) IV Intermittent every 12 hours  vasopressin Infusion 0.04 Unit(s)/Min (2.4 mL/Hr) IV Continuous <Continuous>    MEDICATIONS  (PRN):      ALLERGIES:  Allergies    No Known Allergies    Intolerances        LABS:                        12.3   4.76  )-----------( 174      ( 12 Mar 2022 05:54 )             39.3     03-12    139  |  109<H>  |  8   ----------------------------<  125<H>  x    |  21<L>  |  0.68    Ca    7.1<L>      12 Mar 2022 05:54  Phos  2.6     03-12  Mg     2.2     03-12    TPro  5.1<L>  /  Alb  3.0<L>  /  TBili  0.6  /  DBili  x   /  AST  37  /  ALT  35  /  AlkPhos  46  03-12    PT/INR - ( 11 Mar 2022 15:20 )   PT: 11.8 sec;   INR: 0.99          PTT - ( 11 Mar 2022 15:20 )  PTT:30.2 sec  Urinalysis Basic - ( 11 Mar 2022 16:52 )    Color: Yellow / Appearance: Clear / S.015 / pH: x  Gluc: x / Ketone: NEGATIVE  / Bili: Negative / Urobili: 0.2 E.U./dL   Blood: x / Protein: Trace mg/dL / Nitrite: NEGATIVE   Leuk Esterase: NEGATIVE / RBC: < 5 /HPF / WBC 5-10 /HPF   Sq Epi: x / Non Sq Epi: 0-5 /HPF / Bacteria: Many /HPF        RADIOLOGY & ADDITIONAL TESTS: Reviewed. INTERVAL HPI/OVERNIGHT EVENTS: US abdomen 3/11: Echogenic renal parenchyma bilaterally, consistent with medical renal disease. Small stones and a moderate amount of sludge within the gallbladder. New 1.2 cm echogenic mass left kidney. Recommend further evaluation with either CT scan or MRI with renal mass protocol. Trace ascites. Possible new small cystic pancreatic lesion or ascites adjacent to pancreas. This may also be assessed on CT scan or MRI.    SUBJECTIVE: Patient seen and examined at bedside. Patient with 7/10 intermittent epigastric and RUQ pain, and denies nausea, vomiting, diarrhea. Patient also reports SOB while lying flat. No other complaints, rest of ROS negative.     OBJECTIVE:    VITAL SIGNS:  ICU Vital Signs Last 24 Hrs  T(C): 37.2 (11 Mar 2022 23:29), Max: 38.8 (11 Mar 2022 18:57)  T(F): 99 (11 Mar 2022 23:29), Max: 101.9 (11 Mar 2022 18:57)  HR: 62 (12 Mar 2022 05:00) (62 - 91)  BP: 92/54 (12 Mar 2022 03:30) (73/44 - 119/58)  BP(mean): 67 (12 Mar 2022 03:30) (64 - 83)  ABP: 135/68 (12 Mar 2022 05:00) (75/47 - 139/69)  ABP(mean): 93 (12 Mar 2022 05:00) (52 - 95)  RR: 37 (12 Mar 2022 05:00) (16 - 37)  SpO2: 98% (12 Mar 2022 05:00) (90% - 99%)        -11 @ 07:01  -  03-12 @ 06:39  --------------------------------------------------------  IN: 159.4 mL / OUT: 480 mL / NET: -320.6 mL      CAPILLARY BLOOD GLUCOSE      PHYSICAL EXAM:    Constitutional: mild distress due to pain, well-groomed, well-developed  HEENT: PERRLA, EOMI, Normal Hearing, MMM  Neck: No LAD, No JVD  Respiratory: CTA BL  Cardiovascular: S1 and S2, RRR, no M/G/R  Gastrointestinal: BS+, Hernandez positive, and epigastric pain on light palpation   Extremities: trace peripheral edema  Vascular: 2+ peripheral pulses  Neurological: A/O x 3, no focal deficits  Psychiatric: Normal mood, normal affect  Musculoskeletal: 5/5 strength b/l upper and lower extremities  Skin: No rashes    MEDICATIONS:  MEDICATIONS  (STANDING):  chlorhexidine 2% Cloths 1 Application(s) Topical <User Schedule>  enoxaparin Injectable 40 milliGRAM(s) SubCutaneous every 24 hours  norepinephrine Infusion 0.05 MICROgram(s)/kG/Min (5.63 mL/Hr) IV Continuous <Continuous>  pantoprazole  Injectable 40 milliGRAM(s) IV Push every 12 hours  piperacillin/tazobactam IVPB.. 3.375 Gram(s) IV Intermittent every 6 hours  vancomycin  IVPB 1000 milliGRAM(s) IV Intermittent every 12 hours  vasopressin Infusion 0.04 Unit(s)/Min (2.4 mL/Hr) IV Continuous <Continuous>    MEDICATIONS  (PRN):      ALLERGIES:  Allergies    No Known Allergies    Intolerances        LABS:                        12.3   4.76  )-----------( 174      ( 12 Mar 2022 05:54 )             39.3     03-12    139  |  109<H>  |  8   ----------------------------<  125<H>  x    |  21<L>  |  0.68    Ca    7.1<L>      12 Mar 2022 05:54  Phos  2.6     03-12  Mg     2.2     03-12    TPro  5.1<L>  /  Alb  3.0<L>  /  TBili  0.6  /  DBili  x   /  AST  37  /  ALT  35  /  AlkPhos  46  03-12    PT/INR - ( 11 Mar 2022 15:20 )   PT: 11.8 sec;   INR: 0.99          PTT - ( 11 Mar 2022 15:20 )  PTT:30.2 sec  Urinalysis Basic - ( 11 Mar 2022 16:52 )    Color: Yellow / Appearance: Clear / S.015 / pH: x  Gluc: x / Ketone: NEGATIVE  / Bili: Negative / Urobili: 0.2 E.U./dL   Blood: x / Protein: Trace mg/dL / Nitrite: NEGATIVE   Leuk Esterase: NEGATIVE / RBC: < 5 /HPF / WBC 5-10 /HPF   Sq Epi: x / Non Sq Epi: 0-5 /HPF / Bacteria: Many /HPF        RADIOLOGY & ADDITIONAL TESTS: Reviewed. INTERVAL HPI/OVERNIGHT EVENTS: US abdomen 3/11: Echogenic renal parenchyma bilaterally, consistent with medical renal disease. Small stones and a moderate amount of sludge within the gallbladder. New 1.2 cm echogenic mass left kidney. Recommend further evaluation with either CT scan or MRI with renal mass protocol. Trace ascites. Possible new small cystic pancreatic lesion or ascites adjacent to pancreas. This may also be assessed on CT scan or MRI.    SUBJECTIVE: Patient seen and examined at bedside. Patient with 7/10 intermittent epigastric and RUQ pain, and denies nausea, vomiting, diarrhea. Patient also reports SOB while lying flat. No other complaints, rest of ROS negative.     OBJECTIVE:    VITAL SIGNS:  ICU Vital Signs Last 24 Hrs  T(C): 37.2 (11 Mar 2022 23:29), Max: 38.8 (11 Mar 2022 18:57)  T(F): 99 (11 Mar 2022 23:29), Max: 101.9 (11 Mar 2022 18:57)  HR: 62 (12 Mar 2022 05:00) (62 - 91)  BP: 92/54 (12 Mar 2022 03:30) (73/44 - 119/58)  BP(mean): 67 (12 Mar 2022 03:30) (64 - 83)  ABP: 135/68 (12 Mar 2022 05:00) (75/47 - 139/69)  ABP(mean): 93 (12 Mar 2022 05:00) (52 - 95)  RR: 37 (12 Mar 2022 05:00) (16 - 37)  SpO2: 98% (12 Mar 2022 05:00) (90% - 99%)        -11 @ 07:01  -  03-12 @ 06:39  --------------------------------------------------------  IN: 159.4 mL / OUT: 480 mL / NET: -320.6 mL      Drips:  - c/w levophed 0.05 microgram/kg/min      CAPILLARY BLOOD GLUCOSE      PHYSICAL EXAM:    Constitutional: mild distress due to pain, well-groomed, well-developed  HEENT: PERRLA, EOMI, Normal Hearing, MMM  Neck: No LAD, No JVD  Respiratory: CTA BL  Cardiovascular: S1 and S2, RRR, no M/G/R  Gastrointestinal: BS+, Hernandez positive, and epigastric pain on light palpation   Extremities: trace peripheral edema  Vascular: 2+ peripheral pulses  Neurological: A/O x 3, no focal deficits  Psychiatric: Normal mood, normal affect  Musculoskeletal: 5/5 strength b/l upper and lower extremities  Skin: No rashes    MEDICATIONS:  MEDICATIONS  (STANDING):  chlorhexidine 2% Cloths 1 Application(s) Topical <User Schedule>  enoxaparin Injectable 40 milliGRAM(s) SubCutaneous every 24 hours  norepinephrine Infusion 0.05 MICROgram(s)/kG/Min (5.63 mL/Hr) IV Continuous <Continuous>  pantoprazole  Injectable 40 milliGRAM(s) IV Push every 12 hours  piperacillin/tazobactam IVPB.. 3.375 Gram(s) IV Intermittent every 6 hours  vancomycin  IVPB 1000 milliGRAM(s) IV Intermittent every 12 hours  vasopressin Infusion 0.04 Unit(s)/Min (2.4 mL/Hr) IV Continuous <Continuous>    MEDICATIONS  (PRN):      ALLERGIES:  Allergies    No Known Allergies    Intolerances        LABS:                        12.3   4.76  )-----------( 174      ( 12 Mar 2022 05:54 )             39.3     03-12    139  |  109<H>  |  8   ----------------------------<  125<H>  x    |  21<L>  |  0.68    Ca    7.1<L>      12 Mar 2022 05:54  Phos  2.6     03-12  Mg     2.2     03-12    TPro  5.1<L>  /  Alb  3.0<L>  /  TBili  0.6  /  DBili  x   /  AST  37  /  ALT  35  /  AlkPhos  46  03-12    PT/INR - ( 11 Mar 2022 15:20 )   PT: 11.8 sec;   INR: 0.99          PTT - ( 11 Mar 2022 15:20 )  PTT:30.2 sec  Urinalysis Basic - ( 11 Mar 2022 16:52 )    Color: Yellow / Appearance: Clear / S.015 / pH: x  Gluc: x / Ketone: NEGATIVE  / Bili: Negative / Urobili: 0.2 E.U./dL   Blood: x / Protein: Trace mg/dL / Nitrite: NEGATIVE   Leuk Esterase: NEGATIVE / RBC: < 5 /HPF / WBC 5-10 /HPF   Sq Epi: x / Non Sq Epi: 0-5 /HPF / Bacteria: Many /HPF        RADIOLOGY & ADDITIONAL TESTS: Reviewed.

## 2022-03-12 NOTE — CONSULT NOTE ADULT - ASSESSMENT
Patient is a 62 yo F with a past medical Hx of HTN, asthma, RA, prior ovarian CA s/p DENNIS with BSO, and umbilical hernia repair, who presents from home due to epigastric pain since Thursday evening. General surgery consulted to rule out cholecystitis.     Recommendations:  - unlikely cholecystitis given US and CT findings  - recommend HIDA to confirm rule out  - cholelithiasis without concern for cholecystitis or choledocolithiasis  - TB/LFTs wnl, no concern for choledocolithiasis  - exam consistent with CT finding of severe enteritis  - f/u GI recs for enteritis  - continue abx, currently on zosyn  - no concern for small bowel ischemia given normal lactate and clinical exam  - surgery team 4c following  - seen and examined with Dr. Roldan

## 2022-03-12 NOTE — CONSULT NOTE ADULT - SUBJECTIVE AND OBJECTIVE BOX
Surgery Consult      Consulting surgical team: [ ] 1 - colorectal/surg onc   [ ] 2 - MIS/bariatrics   [x] 4 - acute care   [ ] 5 - general surgery/breast/pediatrics  Consulting attending: Dr. Roldan      HPI: Patient is a 60 yo F with a past medical Hx of HTN, asthma, RA, prior ovarian CA s/p DENNIS with BSO, and umbilical hernia repair, who presents from home due to epigastric pain since yesterday evening. Patient reports having acute epigastric pain with nausea and NBNB vomiting after eating takeout Chinese food yesterday evening. Patient attempted OTC Mylanta, which improved her abdominal pain initially, but came into the ED later today due to persistent abdominal pain. Patient appears uncomfortable, but speaking in full sentences. She reports her nausea has resolved, but still has abdominal discomfort    ED Course  Vitals: T 101.8 BP 73/44 HR 81 SaO2 96 RA  Labs: WBC 7.76, Hb 15.5, platelets 238, Na 145, K 3.2, HCO3- 30, creatinine 0.72, COVID-19 negative   Imaging: Chest X Ray shows no acute cardiopulmonary, elevated L hemidiaphragm  Medication: 3L NS, IV Tylenol 1g, IV Zofran 4mg, Vancomycin 1g, Zosyn 3.375g     Patient transferred to the MICU for management of shock (11 Mar 2022 23:52)        General surgery consulted to rule out cholecystitis.     Patient reports that she has not eaten since Thursday. Intermittently having abdominal pain diffusely. Currently denies pain.      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        Vitals:  Vital Signs Last 24 Hrs  T(C): 36.9 (12 Mar 2022 09:14), Max: 38.8 (11 Mar 2022 18:57)  T(F): 98.5 (12 Mar 2022 09:14), Max: 101.9 (11 Mar 2022 18:57)  HR: 69 (12 Mar 2022 10:00) (59 - 91)  BP: 87/54 (12 Mar 2022 10:00) (73/44 - 141/88)  BP(mean): 65 (12 Mar 2022 10:00) (64 - 109)  RR: 20 (12 Mar 2022 10:00) (16 - 48)  SpO2: 100% (12 Mar 2022 10:00) (90% - 100%)      PHYSICAL EXAM:  General: NAD, resting comfortably in bed  Pulm: Nonlabored breathing, no respiratory distress  Abd: soft, ND, mildly tender to palpation diffusely  Extrem: WWP, no edema  Neuro: A/O x 3, CNs II-XII grossly intact, no focal deficits, normal sensation      I&o's:  I&O's Summary    11 Mar 2022 07:01  -  12 Mar 2022 07:00  --------------------------------------------------------  IN: 859 mL / OUT: 705 mL / NET: 154 mL    12 Mar 2022 06:01  -  12 Mar 2022 11:28  --------------------------------------------------------  IN: 292.4 mL / OUT: 155 mL / NET: 137.4 mL        LABS:                        12.3   4.76  )-----------( 174      ( 12 Mar 2022 05:54 )             39.3     03-12    139  |  109<H>  |  8   ----------------------------<  125<H>  4.5   |  21<L>  |  0.68    Ca    7.1<L>      12 Mar 2022 05:54  Phos  2.6       Mg     2.2         TPro  5.1<L>  /  Alb  3.0<L>  /  TBili  0.6  /  DBili  x   /  AST  37  /  ALT  35  /  AlkPhos  46  12    Lactate: Lactate, Blood: 0.6 mmol/L ( @ 05:54)  Lactate, Blood: 0.9 mmol/L ( @ 23:21)  Lactate, Blood: 1.4 mmol/L ( @ 20:21)     PT/INR - ( 11 Mar 2022 15:20 )   PT: 11.8 sec;   INR: 0.99          PTT - ( 11 Mar 2022 15:20 )  PTT:30.2 sec    CARDIAC MARKERS ( 11 Mar 2022 23:21 )  x     / 0.01 ng/mL / 77 U/L / x     / 1.6 ng/mL  CARDIAC MARKERS ( 11 Mar 2022 18:17 )  x     / 0.01 ng/mL / x     / x     / x            Urinalysis Basic - ( 11 Mar 2022 16:52 )    Color: Yellow / Appearance: Clear / S.015 / pH: x  Gluc: x / Ketone: NEGATIVE  / Bili: Negative / Urobili: 0.2 E.U./dL   Blood: x / Protein: Trace mg/dL / Nitrite: NEGATIVE   Leuk Esterase: NEGATIVE / RBC: < 5 /HPF / WBC 5-10 /HPF   Sq Epi: x / Non Sq Epi: 0-5 /HPF / Bacteria: Many /HPF        MICRO:   Culture - Blood (22 @ 21:29)    Specimen Source: .Blood Blood    Culture Results:   No growth at 12 hours        IMAGING:   < from: US Abdomen Complete (US Abdomen Complete .) (22 @ 18:11) >  1.  Echogenic renal parenchyma bilaterally, consistent with medical renal   disease.  2.  Small stones and a moderate amount of sludge within the gallbladder.  3.  New 1.2 cm echogenic mass left kidney. Recommend further evaluation   with either CT scan or MRI with renal mass protocol.  4.  Trace ascites.  5.  Possible new small cystic pancreatic lesion or ascites adjacent to   pancreas. This may also be assessed on CT scan or MRI.    < from: CT Abdomen and Pelvis w/ IV Cont (22 @ 02:15) >  1. There is severe extensive inflammatory thickening of the wall of   numerous contiguous loops of proximal and mid small bowel, compatible   with severe enteritis which could be due to infection, inflammatory bowel   disease, or ischemia. No specific ancillary findings of bowel ischemia.  2. Small volume ascites.  3. Gallbladder is markedly distended and there is dependent   cholelithiasis. No wall thickening or pericholecystic inflammation. Small   volume of pericholecystic fluid in the setting periportal edema and IVC   distention is likely related to patient's fluid status/fluid overload   rather than cholecystitis.        
[facility] SERVICE CONSULTATION NOTE    CC: Nausea, vomiting    HPI:  62 y/o woman with a PMHx of HTN, asthma, RA (on plaquenil), prior ovarian CA s/p DENNIS with BSO, and umbilical hernia repair, who presents from home due to epigastric pain since yesterday evening. Patient reports having acute epigastric pain with nausea and NBNB vomiting after eating takeout Chinese food yesterday evening. She attempted OTC mylenta which improved her abdominal pain initially, but came into the ED later today due to persistent abdominal pain. Abdominal US without significant findings. Patient dropped her sBP from 100 to 70s after 2 liters of fluid resuscitation in the ED. ICU consulted for hypotension with sBP in the 70s-80s.    Patient was seen and examined at bedside in the ED. Patient appears uncomfortable but speaking in full sentences. She reports her nausea has resolved but still has abdominal pain. She also reports feeling a bit more short of breath than earlier in her stay in the ED. Denies any CP    ROS:  Otherwise negative, except as specified in HPI.    PMH:  HTN, asthma, RA  Ovarian CA s/p DENNIS with BSO    PSH:  DENNIS with BSO  umbilical hernia repair    ALLERGIES: NKDA    MEDICATIONS:  losartan 25m or 50mg qd  plaquenil 200mg BID    VITAL SIGNS:  ICU Vital Signs Last 24 Hrs  T(C): 38.8 (11 Mar 2022 20:26), Max: 38.8 (11 Mar 2022 18:57)  T(F): 101.8 (11 Mar 2022 20:26), Max: 101.9 (11 Mar 2022 18:57)  HR: 81 (11 Mar 2022 21:25) (78 - 91)  BP: 73/44 (11 Mar 2022 21:25) (73/44 - 100/65)  BP(mean): --  ABP: --  ABP(mean): --  RR: 16 (11 Mar 2022 21:25) (16 - 18)  SpO2: 96% (11 Mar 2022 21:25) (95% - 97%)    CAPILLARY BLOOD GLUCOSE          PHYSICAL EXAM:  GENERAL: In moderate distress, tachypneic appearing. Speaking in full sentences  HEENT: NC/AT. PERRL. EOMI.   CV: RRR. +S1/S2.   LUNGS: Decreased breath sounds in LL base. No wheezing  ABDOMEN: Soft, nontender, distended abdomen. No rebound or guarding  EXT: WWP. No edema in b/l extremities      LABS:                        15.5   7.71  )-----------( 238      ( 11 Mar 2022 15:20 )             46.6         145  |  104  |  11  ----------------------------<  123<H>  3.2<L>   |  30  |  0.72    Ca    8.3<L>      11 Mar 2022 18:17    TPro  6.0  /  Alb  3.6  /  TBili  0.6  /  DBili  x   /  AST  18  /  ALT  13  /  AlkPhos  54  11    PT/INR - ( 11 Mar 2022 15:20 )   PT: 11.8 sec;   INR: 0.99          PTT - ( 11 Mar 2022 15:20 )  PTT:30.2 sec  Lactate, Blood: 1.4 mmol/L (22 @ 20:21)    CARDIAC MARKERS ( 11 Mar 2022 18:17 )  x     / 0.01 ng/mL / x     / x     / x          Urinalysis Basic - ( 11 Mar 2022 16:52 )    Color: Yellow / Appearance: Clear / S.015 / pH: x  Gluc: x / Ketone: NEGATIVE  / Bili: Negative / Urobili: 0.2 E.U./dL   Blood: x / Protein: Trace mg/dL / Nitrite: NEGATIVE   Leuk Esterase: NEGATIVE / RBC: < 5 /HPF / WBC 5-10 /HPF   Sq Epi: x / Non Sq Epi: 0-5 /HPF / Bacteria: Many /HPF      Blood Gas Profile w/Lytes - Venous: Performed in Lab (22 @ 20:26)      EKG: Reviewed.    RADIOLOGY & ADDITIONAL TESTS: Reviewed.

## 2022-03-12 NOTE — PATIENT PROFILE ADULT - FALL HARM RISK - UNIVERSAL INTERVENTIONS
Bed in lowest position, wheels locked, appropriate side rails in place/Call bell, personal items and telephone in reach/Instruct patient to call for assistance before getting out of bed or chair/Non-slip footwear when patient is out of bed/[place] to call system/Physically safe environment - no spills, clutter or unnecessary equipment/Purposeful Proactive Rounding/Room/bathroom lighting operational, light cord in reach

## 2022-03-12 NOTE — CONSULT NOTE ADULT - ATTENDING COMMENTS
Patient seen and examined at bedside with Dr. Gusman.  Agree with above.    Abdomen soft, ND, mild diffuse tenderness without rebound/guarding.  Well-healed midline laparotomy.    Labs, CT reviewed, no definite evidence of cholecystitis, severe enteritis noted.    -Cholecystitis unlikely given CT findings, but can obtain HIDA to rule out cholecystitis; if positive, would recommend percutaneous cholecystostomy tube placement given pressor needs currently  -Enteritis management as per Dr. White
Tawny Bagley [id number]  This is a 60 y/o female with h/o HTN, asthma, RA, ovarian cancer s/p DENNIS BSO who has epigastric pain associated with N/V since yesterday after eating Chinese take out food.  In the ED she was hypotensive, IVF boluses, she was transferred to the ICU where she was started on levophed.  POCUS showed hyperdynamic cardiac contractility, IVC showed 20% collapsibility, arterial line placed.  She is a/o x3, abd distended, NT.  C/w Levophed to maintain a MAP of 65 to 70 mmHg, vasopressin was added later, she has good U/O and is A/O x3.  -SIRS with shock  -gastritis  -RA on Plaquenil  -CT chest abd and pelvis   Please see above for the rest of the details.

## 2022-03-13 DIAGNOSIS — A41.9 SEPSIS, UNSPECIFIED ORGANISM: ICD-10-CM

## 2022-03-13 DIAGNOSIS — E83.51 HYPOCALCEMIA: ICD-10-CM

## 2022-03-13 DIAGNOSIS — I10 ESSENTIAL (PRIMARY) HYPERTENSION: ICD-10-CM

## 2022-03-13 DIAGNOSIS — M06.9 RHEUMATOID ARTHRITIS, UNSPECIFIED: ICD-10-CM

## 2022-03-13 DIAGNOSIS — J45.909 UNSPECIFIED ASTHMA, UNCOMPLICATED: ICD-10-CM

## 2022-03-13 DIAGNOSIS — K52.9 NONINFECTIVE GASTROENTERITIS AND COLITIS, UNSPECIFIED: ICD-10-CM

## 2022-03-13 DIAGNOSIS — R63.8 OTHER SYMPTOMS AND SIGNS CONCERNING FOOD AND FLUID INTAKE: ICD-10-CM

## 2022-03-13 LAB
24R-OH-CALCIDIOL SERPL-MCNC: 55 NG/ML — SIGNIFICANT CHANGE UP (ref 30–80)
ALBUMIN SERPL ELPH-MCNC: 2.6 G/DL — LOW (ref 3.3–5)
ALP SERPL-CCNC: 39 U/L — LOW (ref 40–120)
ALT FLD-CCNC: 25 U/L — SIGNIFICANT CHANGE UP (ref 10–45)
ANION GAP SERPL CALC-SCNC: 10 MMOL/L — SIGNIFICANT CHANGE UP (ref 5–17)
AST SERPL-CCNC: 21 U/L — SIGNIFICANT CHANGE UP (ref 10–40)
BASOPHILS # BLD AUTO: 0.03 K/UL — SIGNIFICANT CHANGE UP (ref 0–0.2)
BASOPHILS NFR BLD AUTO: 0.6 % — SIGNIFICANT CHANGE UP (ref 0–2)
BILIRUB SERPL-MCNC: 0.5 MG/DL — SIGNIFICANT CHANGE UP (ref 0.2–1.2)
BUN SERPL-MCNC: 8 MG/DL — SIGNIFICANT CHANGE UP (ref 7–23)
CA-I BLD-SCNC: 1.08 MMOL/L — LOW (ref 1.15–1.33)
CALCIUM SERPL-MCNC: 7.5 MG/DL — LOW (ref 8.4–10.5)
CALCIUM SERPL-MCNC: 7.7 MG/DL — LOW (ref 8.4–10.5)
CHLORIDE SERPL-SCNC: 109 MMOL/L — HIGH (ref 96–108)
CO2 SERPL-SCNC: 23 MMOL/L — SIGNIFICANT CHANGE UP (ref 22–31)
CREAT SERPL-MCNC: 0.62 MG/DL — SIGNIFICANT CHANGE UP (ref 0.5–1.3)
CULTURE RESULTS: SIGNIFICANT CHANGE UP
EGFR: 100 ML/MIN/1.73M2 — SIGNIFICANT CHANGE UP
EOSINOPHIL # BLD AUTO: 0.11 K/UL — SIGNIFICANT CHANGE UP (ref 0–0.5)
EOSINOPHIL NFR BLD AUTO: 2.1 % — SIGNIFICANT CHANGE UP (ref 0–6)
GLUCOSE SERPL-MCNC: 73 MG/DL — SIGNIFICANT CHANGE UP (ref 70–99)
HCT VFR BLD CALC: 35.6 % — SIGNIFICANT CHANGE UP (ref 34.5–45)
HGB BLD-MCNC: 11.1 G/DL — LOW (ref 11.5–15.5)
IMM GRANULOCYTES NFR BLD AUTO: 0.4 % — SIGNIFICANT CHANGE UP (ref 0–1.5)
LYMPHOCYTES # BLD AUTO: 0.87 K/UL — LOW (ref 1–3.3)
LYMPHOCYTES # BLD AUTO: 16.9 % — SIGNIFICANT CHANGE UP (ref 13–44)
MAGNESIUM SERPL-MCNC: 2 MG/DL — SIGNIFICANT CHANGE UP (ref 1.6–2.6)
MCHC RBC-ENTMCNC: 31.2 GM/DL — LOW (ref 32–36)
MCHC RBC-ENTMCNC: 31.6 PG — SIGNIFICANT CHANGE UP (ref 27–34)
MCV RBC AUTO: 101.4 FL — HIGH (ref 80–100)
MONOCYTES # BLD AUTO: 0.49 K/UL — SIGNIFICANT CHANGE UP (ref 0–0.9)
MONOCYTES NFR BLD AUTO: 9.5 % — SIGNIFICANT CHANGE UP (ref 2–14)
NEUTROPHILS # BLD AUTO: 3.63 K/UL — SIGNIFICANT CHANGE UP (ref 1.8–7.4)
NEUTROPHILS NFR BLD AUTO: 70.5 % — SIGNIFICANT CHANGE UP (ref 43–77)
NRBC # BLD: 0 /100 WBCS — SIGNIFICANT CHANGE UP (ref 0–0)
PHOSPHATE SERPL-MCNC: 2.3 MG/DL — LOW (ref 2.5–4.5)
PLATELET # BLD AUTO: 134 K/UL — LOW (ref 150–400)
POTASSIUM SERPL-MCNC: 3.4 MMOL/L — LOW (ref 3.5–5.3)
POTASSIUM SERPL-SCNC: 3.4 MMOL/L — LOW (ref 3.5–5.3)
PROT SERPL-MCNC: 4.7 G/DL — LOW (ref 6–8.3)
PTH-INTACT FLD-MCNC: 102 PG/ML — HIGH (ref 15–65)
RBC # BLD: 3.51 M/UL — LOW (ref 3.8–5.2)
RBC # FLD: 12.2 % — SIGNIFICANT CHANGE UP (ref 10.3–14.5)
SODIUM SERPL-SCNC: 142 MMOL/L — SIGNIFICANT CHANGE UP (ref 135–145)
SPECIMEN SOURCE: SIGNIFICANT CHANGE UP
WBC # BLD: 5.15 K/UL — SIGNIFICANT CHANGE UP (ref 3.8–10.5)
WBC # FLD AUTO: 5.15 K/UL — SIGNIFICANT CHANGE UP (ref 3.8–10.5)

## 2022-03-13 PROCEDURE — 99232 SBSQ HOSP IP/OBS MODERATE 35: CPT | Mod: GC

## 2022-03-13 RX ORDER — SODIUM CHLORIDE 9 MG/ML
500 INJECTION, SOLUTION INTRAVENOUS ONCE
Refills: 0 | Status: COMPLETED | OUTPATIENT
Start: 2022-03-13 | End: 2022-03-13

## 2022-03-13 RX ORDER — POTASSIUM PHOSPHATE, MONOBASIC POTASSIUM PHOSPHATE, DIBASIC 236; 224 MG/ML; MG/ML
15 INJECTION, SOLUTION INTRAVENOUS ONCE
Refills: 0 | Status: COMPLETED | OUTPATIENT
Start: 2022-03-13 | End: 2022-03-13

## 2022-03-13 RX ORDER — POTASSIUM CHLORIDE 20 MEQ
40 PACKET (EA) ORAL ONCE
Refills: 0 | Status: DISCONTINUED | OUTPATIENT
Start: 2022-03-13 | End: 2022-03-13

## 2022-03-13 RX ORDER — POTASSIUM CHLORIDE 20 MEQ
20 PACKET (EA) ORAL
Refills: 0 | Status: COMPLETED | OUTPATIENT
Start: 2022-03-13 | End: 2022-03-13

## 2022-03-13 RX ORDER — SODIUM CHLORIDE 9 MG/ML
1000 INJECTION, SOLUTION INTRAVENOUS
Refills: 0 | Status: DISCONTINUED | OUTPATIENT
Start: 2022-03-13 | End: 2022-03-14

## 2022-03-13 RX ORDER — METOCLOPRAMIDE HCL 10 MG
10 TABLET ORAL ONCE
Refills: 0 | Status: COMPLETED | OUTPATIENT
Start: 2022-03-13 | End: 2022-03-13

## 2022-03-13 RX ADMIN — PIPERACILLIN AND TAZOBACTAM 200 GRAM(S): 4; .5 INJECTION, POWDER, LYOPHILIZED, FOR SOLUTION INTRAVENOUS at 13:56

## 2022-03-13 RX ADMIN — Medication 650 MILLIGRAM(S): at 07:54

## 2022-03-13 RX ADMIN — SODIUM CHLORIDE 1000 MILLILITER(S): 9 INJECTION, SOLUTION INTRAVENOUS at 03:57

## 2022-03-13 RX ADMIN — Medication 20 MILLIEQUIVALENT(S): at 09:16

## 2022-03-13 RX ADMIN — Medication 650 MILLIGRAM(S): at 18:19

## 2022-03-13 RX ADMIN — PIPERACILLIN AND TAZOBACTAM 200 GRAM(S): 4; .5 INJECTION, POWDER, LYOPHILIZED, FOR SOLUTION INTRAVENOUS at 20:33

## 2022-03-13 RX ADMIN — PIPERACILLIN AND TAZOBACTAM 200 GRAM(S): 4; .5 INJECTION, POWDER, LYOPHILIZED, FOR SOLUTION INTRAVENOUS at 03:26

## 2022-03-13 RX ADMIN — Medication 650 MILLIGRAM(S): at 11:46

## 2022-03-13 RX ADMIN — POTASSIUM PHOSPHATE, MONOBASIC POTASSIUM PHOSPHATE, DIBASIC 62.5 MILLIMOLE(S): 236; 224 INJECTION, SOLUTION INTRAVENOUS at 09:16

## 2022-03-13 RX ADMIN — Medication 650 MILLIGRAM(S): at 12:15

## 2022-03-13 RX ADMIN — SODIUM CHLORIDE 80 MILLILITER(S): 9 INJECTION, SOLUTION INTRAVENOUS at 13:56

## 2022-03-13 RX ADMIN — Medication 650 MILLIGRAM(S): at 00:00

## 2022-03-13 RX ADMIN — Medication 20 MILLIEQUIVALENT(S): at 07:59

## 2022-03-13 RX ADMIN — Medication 650 MILLIGRAM(S): at 06:47

## 2022-03-13 RX ADMIN — PANTOPRAZOLE SODIUM 40 MILLIGRAM(S): 20 TABLET, DELAYED RELEASE ORAL at 18:19

## 2022-03-13 RX ADMIN — PIPERACILLIN AND TAZOBACTAM 200 GRAM(S): 4; .5 INJECTION, POWDER, LYOPHILIZED, FOR SOLUTION INTRAVENOUS at 07:53

## 2022-03-13 RX ADMIN — CHLORHEXIDINE GLUCONATE 1 APPLICATION(S): 213 SOLUTION TOPICAL at 06:49

## 2022-03-13 RX ADMIN — Medication 650 MILLIGRAM(S): at 00:01

## 2022-03-13 RX ADMIN — ENOXAPARIN SODIUM 40 MILLIGRAM(S): 100 INJECTION SUBCUTANEOUS at 06:47

## 2022-03-13 RX ADMIN — Medication 650 MILLIGRAM(S): at 18:49

## 2022-03-13 RX ADMIN — PANTOPRAZOLE SODIUM 40 MILLIGRAM(S): 20 TABLET, DELAYED RELEASE ORAL at 06:56

## 2022-03-13 NOTE — PROGRESS NOTE ADULT - PROBLEM SELECTOR PLAN 3
- PTH  - Ionized calcium  - Vit D 25  - Vit 1, 25 - PTH - 102  - Ionized calcium - 1.08  - Vit D 25 55.0  - Vit 1, 25 -   - calcium 7.7 corrected to 8.8

## 2022-03-13 NOTE — PROGRESS NOTE ADULT - SUBJECTIVE AND OBJECTIVE BOX
SUBJECTIVE: Pt visited bedside. Stepped down from ICU. Pain fairly improved.    MEDICATIONS  (STANDING):  acetaminophen     Tablet .. 650 milliGRAM(s) Oral every 6 hours  chlorhexidine 2% Cloths 1 Application(s) Topical <User Schedule>  enoxaparin Injectable 40 milliGRAM(s) SubCutaneous every 24 hours  lactated ringers. 1000 milliLiter(s) (80 mL/Hr) IV Continuous <Continuous>  pantoprazole  Injectable 40 milliGRAM(s) IV Push every 12 hours  piperacillin/tazobactam IVPB.. 3.375 Gram(s) IV Intermittent every 6 hours    MEDICATIONS  (PRN):  aluminum hydroxide/magnesium hydroxide/simethicone Suspension 30 milliLiter(s) Oral every 4 hours PRN Dyspepsia  traMADol 25 milliGRAM(s) Oral every 6 hours PRN Moderate Pain (4 - 6)      Vital Signs Last 24 Hrs  T(C): 36.6 (13 Mar 2022 12:00), Max: 36.7 (13 Mar 2022 01:06)  T(F): 97.8 (13 Mar 2022 12:00), Max: 98 (13 Mar 2022 01:06)  HR: 55 (13 Mar 2022 12:00) (52 - 62)  BP: 91/57 (13 Mar 2022 12:00) (91/57 - 118/57)  BP(mean): 69 (13 Mar 2022 11:00) (69 - 84)  RR: 20 (13 Mar 2022 12:00) (16 - 41)  SpO2: 98% (13 Mar 2022 12:00) (95% - 98%)    Physical Exam:  General: NAD, resting comfortably in bed  Pulmonary: Nonlabored breathing, no respiratory distress  Cardiovascular: NSR  Abdominal: soft, non-distended, mild epigastric tenderness  Extremities: WWP, normal strength  Neuro: A/O x 3, CNs II-XII grossly intact, no focal deficits, normal motor/sensation  Pulses: palpable distal pulses    I&O's Summary    12 Mar 2022 06:01  -  13 Mar 2022 07:00  --------------------------------------------------------  IN: 1733.4 mL / OUT: 1140 mL / NET: 593.4 mL    13 Mar 2022 07:01  -  13 Mar 2022 22:34  --------------------------------------------------------  IN: 575 mL / OUT: 65 mL / NET: 510 mL        LABS:                        11.1   5.15  )-----------( 134      ( 13 Mar 2022 06:14 )             35.6     03-13    142  |  109<H>  |  8   ----------------------------<  73  3.4<L>   |  23  |  0.62    Ca    7.7<L>      13 Mar 2022 06:14  Phos  2.3     -  Mg     2.0         TPro  4.7<L>  /  Alb  2.6<L>  /  TBili  0.5  /  DBili  x   /  AST  21  /  ALT  25  /  AlkPhos  39<L>  -        CAPILLARY BLOOD GLUCOSE        LIVER FUNCTIONS - ( 13 Mar 2022 06:14 )  Alb: 2.6 g/dL / Pro: 4.7 g/dL / ALK PHOS: 39 U/L / ALT: 25 U/L / AST: 21 U/L / GGT: x             RADIOLOGY & ADDITIONAL STUDIES:    ACC: [id number] EXAM: CT ABDOMEN AND PELVIS IC  PROCEDURE DATE: 2022  INTERPRETATION: ATTENDING RADIOLOGIST ADDENDUM: AGREE WITH THE BELOW REPORT WITH THE FOLLOWING ADDENDUM:    NONE.  ==================================================================================  Patient Name: MICKI GREEN MRN: [id number]   (Age): 1960 61 Gender: F  Date of Exam: 2022 Accession: [id number]  Referring Physician: ANUSHKA GOMEZ # of Images: [phone]  Ordered As: CT ABDOMEN/PELVIS W    PROCEDURE INFORMATION:  Exam: CT Abdomen And Pelvis With Contrast  Exam date and time: 3/12/2022 2:09 AM  Age: 61 years old  Clinical indication: Other: Fever of unknown origin, abd pain    TECHNIQUE:  Imaging protocol: Computed tomography of the abdomen and pelvis with contrast. 100 cc of Omnipaque 350 administered intravenously. 0 cc discarded.    COMPARISON:  US ABDOMEN COMPLETE 3/11/2022 5:30 PM    FINDINGS:  Heart: Cardiomegaly.  Diaphragm: Elevation of the left hemidiaphragm.  Liver: Subcentimeter hypoattenuating foci in the liver are too small to further characterize and are indeterminate, potentially small incidental hepatic cysts.  Gallbladder and bile ducts: No biliary ductal dilatation. Gallbladder is markedly distended and there is dependent cholelithiasis. No wall thickening or pericholecystic inflammation. Small volume of pericholecystic fluid in the setting periportal edema and IVC distention is likely related to patient's fluid status/fluid overload rather than cholecystitis.  Pancreas: Unremarkable.  Spleen: Normal.  Adrenal glands: Normal. No mass.  Kidneys and ureters: Normal. No hydronephrosis.  Stomach and bowel: Colonic diverticulosis. No diverticulitis. There is severe extensive inflammatory thickening of the wall of numerous contiguous loops of proximal and mid small bowel, compatible with severe enteritis which could be due to infection, inflammatory bowel disease, or ischemia. No specific ancillary findings of bowel ischemia.  Appendix: Prior appendectomy.  Intraperitoneal space: No pneumoperitoneum or abscess. Small volume ascites.  Vasculature: No pneumatosis or portal/mesenteric venous gas. SMV and SMA are patent as far as can be traced.  Lymph nodes: Unremarkable.  Urinary bladder: Unremarkable as visualized.  Reproductive: Hysterectomy.  Bones/joints: Unremarkable. No acute fracture.  Soft tissues: Unremarkable.    IMPRESSION:  1. There is severe extensive inflammatory thickening of the wall of numerous contiguous loops of proximal and mid small bowel, compatible with severe enteritis which could be due to infection, inflammatory bowel disease, or ischemia. No specific ancillary findings of bowel ischemia.  2. Small volume ascites.  3. Gallbladder is markedly distended and there is dependent cholelithiasis. No wall thickening or pericholecystic inflammation. Small volume of pericholecystic fluid in the setting periportal edema and IVC distention is likely related to patient's fluid status/fluid overload rather than cholecystitis.    Thank you for allowing us to participate in the care of your patient.  Dictated and Authenticated by: Orlin Madrigal MD  2022 2:53 AM Eastern Time ([place] & [place])    The above report was submitted by the [facility] attending radiologist and copied to Power[employer] by resident Maximiliano Lopez MD.    ACC: [id number] EXAM: US ABDOMEN COMPLETE    PROCEDURE DATE: 2022        INTERPRETATION: CLINICAL INFORMATION: Abdominal pain.    COMPARISON: CT scan of the abdomen and pelvis from 2014, abdominal ultrasound from 3/28/2013, and additional studies dating back to 2006.    TECHNIQUE: Sonography of the abdomen.    FINDINGS:    Liver: Enlarged. Normal echogenicity. No change 0.9 x 0.8 x 0.9 cm echogenic lesion right lobe of liver, consistent with hemangioma.  Bile ducts: Normal caliber. Common bile duct measures 0.5 cm.  Gallbladder: Small stones and a moderate amount of sludge present within gallbladder. No gallbladder wall thickening or pericholecystic fluid. Negative sonographic Hernandez's sign.  Pancreas: Either small fluid anterior to pancreatic body or new 0.4 x 0.3 x 0.4 cm simple cyst.  Spleen: 7.2 cm. Within normal limits.  Right kidney: 10.7 cm. No hydronephrosis. Echogenic renal parenchyma. Normal renal parenchymal thickness.  Left kidney: 10.8 cm. No hydronephrosis. Echogenic renal parenchyma. There is a new 1.2 x 0.9 x 0.7 cm echogenic mass lateral upper pole.  Ascites: Trace ascites.  Aorta and IVC: Visualized portions are within normal limits.    IMPRESSION:  1. Echogenic renal parenchyma bilaterally, consistent with medical renal disease.  2. Small stones and a moderate amount of sludge within the gallbladder.  3. New 1.2 cm echogenic mass left kidney. Recommend further evaluation with either CT scan or MRI with renal mass protocol.  4. Trace ascites.  5. Possible new small cystic pancreatic lesion or ascites adjacent to pancreas. This may also be assessed on CT scan or MRI.

## 2022-03-13 NOTE — PROGRESS NOTE ADULT - PROBLEM SELECTOR PLAN 2
Hx of GERD. Presents with acute epigastric pain since yesterday evening. Given acuity of pain concern for Boerhaave syndrome 2/2 vomiting and Gallbladder pathology given imaging findings and colicky abdominal pain.   - US abdomen 3/11: Small stones and a moderate amount of sludge within the gallbladder.  - CT Abdomin 3/12: There is severe extensive inflammatory thickening of the wall of numerous contiguous loops of proximal and mid small bowel, compatible with severe enteritis which could be due to infection, inflammatory bowel disease, or ischemia. No specific ancillary findings of bowel ischemia.  Small volume ascites. Gallbladder is markedly distended and there is dependent cholelithiasis. No wall thickening or pericholecystic inflammation.   - No concerns for acute cholecystitis, cholelithiasis without concern for cholecystitis or choledocholithiasis most likely pain secondary to severe enteritis. No concerns for ischemia due to negative lactate.  - IV PPI BID  - keep strictly NPO  - Surgery consulted, recs appreciated > no acute interventions  - GI consulted, rec appreciated  - F/u HIDA scan official report > prelim showed filling in gallbladder  - c/w Zosyn 3.375 g q6h  - Follow-up blood cultures

## 2022-03-13 NOTE — PROGRESS NOTE ADULT - ASSESSMENT
Patient is a 60 yo F with a past medical Hx of HTN, asthma, RA, prior ovarian CA s/p DENNIS with BSO, and umbilical hernia repair, who presents from home due to epigastric pain since Thursday evening. General surgery consulted to rule out cholecystitis, which is unlikely given imaging findings from US (3/11), CTAP (3/12) and primary read from HIDA (3/12), showing no supportive evidence.    - Unlikely cholecystitis given US, CT, and HIDA findings (appreciate final HIDA 3/12 read by attending radiologist)  - cholelithiasis without concern for cholecystitis or choledocolithiasis  - TB/LFTs wnl, no concern for choledocolithiasis  - exam consistent with CT finding of severe enteritis  - f/u GI recs for enteritis  - continue abx, currently on zosyn  - no concern for small bowel ischemia given normal lactate (0.6 3/12) and clinical exam  - surgery team 4c following

## 2022-03-13 NOTE — PROGRESS NOTE ADULT - PROBLEM SELECTOR PLAN 1
[RESOLVED]   on ad mission Patient febrile, leukocytosis and hypotensive requiring pressors possibly 2/2 gastroenteritis  - d/c Vancomycin  - c/w Zosyn

## 2022-03-13 NOTE — PROGRESS NOTE ADULT - ASSESSMENT
60 yo F with a past medical Hx of HTN, asthma, RA, prior ovarian CA s/p DENNIS with BSO, and umbilical hernia repair, who presents from home due to epigastric pain with emesis, found to be in septic shock 2/2 gastroenteritis, on Abx, now off pressors, HD stable    NEURO  AAOx3, no acute intervention    CARDIOVASCULAR  #Shock (resolved)  S/P 3L NS. Lactate negative. Patient currently mentating well with good urine output. Patient started on Levophed and Vasopressin.  -TTE 3/12/22: LVEF 75%  - wean pressors as tolerated  - strict I/Os    #Hx of Hypertension  At home on losartan.  - holding in the setting of sepsis    PULM  CTA B/L , no acute intervention    GI  #Enteritis  Hx of GERD. Presents with acute epigastric pain since yesterday evening. Given acuity of pain concern for Boerhaave syndrome 2/2 vomiting and Gallbladder pathology given imaging findings and colicky abdominal pain.   - US abdomen 3/11: Small stones and a moderate amount of sludge within the gallbladder.  - CT Abdomin 3/12: There is severe extensive inflammatory thickening of the wall of numerous contiguous loops of proximal and mid small bowel, compatible with severe enteritis which could be due to infection, inflammatory bowel disease, or ischemia. No specific ancillary findings of bowel ischemia.  Small volume ascites. Gallbladder is markedly distended and there is dependent cholelithiasis. No wall thickening or pericholecystic inflammation.   - No concerns for acute cholecystitis, cholelithiasis without concern for cholecystitis or choledocholithiasis most likely pain secondary to severe enteritis. No concerns for ischemia due to negative lactate.  - IV PPI BID  - keep strictly NPO  - Surgery consulted, recs appreciated > no acute interventions  - GI consulted, rec appreciated  - F/u HIDA scan official report > prelim showed filling in gallbladder  - c/w Zosyn 3.375 g q6h  - Follow-up blood cultures    #Nausea   - Prolonged  on 3/12  - consider tigan if nauseous     #Elevated L hemidiaphragm  Chest XR done in ED shows elevated L hemidiaphragm. Concern for Diaphragmatic herniation VS Boerhaave syndrome.  - monitor respiratory status    RENAL  #Hypocalcemia  - f/u PTH  - Ionized calcium  - Vit D 25  - Vit 1, 25    #Making urine  - strict I/Os    ID  #Septic shock 2/2 enteritis  Patient febrile and hypotensive possibly 2/2 gastroenteritis  - d/c Vancomycin  - c/w Zosyn  - management as above     RHEUM  #RA  At home on plaquenil  - holding in the setting of septic shock     Prophylactic measure  N: NPO  DVT: Lovenox 40 mEq daily   GI: Protonix IV BID  D: RMF  CODE STATUS: Full

## 2022-03-13 NOTE — PROGRESS NOTE ADULT - SUBJECTIVE AND OBJECTIVE BOX
*INCOMPLETE* TRANSFER NOTE MICU TO [facility]  HOSPITAL COURSE  60 yo F with a past medical Hx of HTN, asthma, RA, prior ovarian CA s/p DENNIS with BSO, and umbilical hernia repair, who presents from home due to epigastric pain since yesterday evening. Patient reports having acute epigastric pain with nausea and NBNB vomiting after eating takeout Chinese food yesterday evening. Patient attempted OTC Mylanta, which improved her abdominal pain initially, but came into the ED later today due to persistent abdominal pain. Upon arrival, pt was febrile to 101.8, BP 73/44, admitted for septic shock 2/2 gastroenteritis, r/o acute cholecystitis, was started on vancomycin (later discontinued since MRSA negative) and zosyn. US abdomen showed echogenic renal parenchyma bilaterally, consistent with medical renal disease. Small stones and a moderate amount of sludge within the gallbladder. New 1.2 cm echogenic mass left kidney. Pt underwent HIDA scan which showed filling in the gallbladder, surgery consulted and r/o acute cholecystitis based on US findings. GI consulted and concern for gastroenteritis. Pt with minimal epigastric pain on 3/13, off pressors, HD stable and stepped down to [facility].     OVERNIGHT EVENTS: Pt started on CLD, and tolerating. Pt received 500cc fluids twice for low UOP.     SUBJECTIVE / INTERVAL HPI: Patient seen and examined at bedside. Pt denies any F/C, N/V/D. Pt reports mild epigastric pain, but improvement overall. No other concerns.     MEDICATIONS  (STANDING):  acetaminophen     Tablet .. 650 milliGRAM(s) Oral every 6 hours  chlorhexidine 2% Cloths 1 Application(s) Topical <User Schedule>  enoxaparin Injectable 40 milliGRAM(s) SubCutaneous every 24 hours  pantoprazole  Injectable 40 milliGRAM(s) IV Push every 12 hours  piperacillin/tazobactam IVPB.. 3.375 Gram(s) IV Intermittent every 6 hours    MEDICATIONS  (PRN):  aluminum hydroxide/magnesium hydroxide/simethicone Suspension 30 milliLiter(s) Oral every 4 hours PRN Dyspepsia  traMADol 25 milliGRAM(s) Oral every 6 hours PRN Moderate Pain (4 - 6)    Allergies    No Known Allergies    Intolerances        VITAL SIGNS:  Vital Signs Last 24 Hrs  T(C): 36.6 (13 Mar 2022 09:24), Max: 37.3 (12 Mar 2022 15:20)  T(F): 97.8 (13 Mar 2022 09:24), Max: 99.1 (12 Mar 2022 15:20)  HR: 57 (13 Mar 2022 11:00) (52 - 68)  BP: 93/54 (13 Mar 2022 11:00) (84/47 - 118/57)  BP(mean): 69 (13 Mar 2022 11:00) (61 - 84)  RR: 22 (13 Mar 2022 11:00) (16 - 41)  SpO2: 98% (13 Mar 2022 11:00) (95% - 100%)      22 @ 06:01  -  22 @ 07:00  --------------------------------------------------------  IN: 1733.4 mL / OUT: 1140 mL / NET: 593.4 mL    22 @ 07:01  -  22 @ 12:29  --------------------------------------------------------  IN: 575 mL / OUT: 65 mL / NET: 510 mL        PHYSICAL EXAM:  General: NAD, Laying comfortably in bed  HEENT: NC/AT, anicteric sclera, MMM  Neck: supple  Cardiovascular: +S1/S2, RRR, No murmurs, rubs, gallops  Respiratory: CTA B/L, no W/R/R  Gastrointestinal: soft, ND, tender in epigastric area, BSx4.   Extremities: WWP, no edema, clubbing or cyanosis  Vascular: 2+ radial, DP/PT pulses B/L  Neurological: AAOx3, no focal deficits      LABS:                        11.1   5.15  )-----------( 134      ( 13 Mar 2022 06:14 )             35.6     03-13    142  |  109<H>  |  8   ----------------------------<  73  3.4<L>   |  23  |  0.62    Ca    7.7<L>      13 Mar 2022 06:14  Phos  2.3     03-13  Mg     2.0     03-13    TPro  4.7<L>  /  Alb  2.6<L>  /  TBili  0.5  /  DBili  x   /  AST  21  /  ALT  25  /  AlkPhos  39<L>  03-13    PT/INR - ( 11 Mar 2022 15:20 )   PT: 11.8 sec;   INR: 0.99          PTT - ( 11 Mar 2022 15:20 )  PTT:30.2 sec  Urinalysis Basic - ( 11 Mar 2022 16:52 )    Color: Yellow / Appearance: Clear / S.015 / pH: x  Gluc: x / Ketone: NEGATIVE  / Bili: Negative / Urobili: 0.2 E.U./dL   Blood: x / Protein: Trace mg/dL / Nitrite: NEGATIVE   Leuk Esterase: NEGATIVE / RBC: < 5 /HPF / WBC 5-10 /HPF   Sq Epi: x / Non Sq Epi: 0-5 /HPF / Bacteria: Many /HPF      CAPILLARY BLOOD GLUCOSE              RADIOLOGY & ADDITIONAL TESTS: Reviewed.

## 2022-03-13 NOTE — PROGRESS NOTE ADULT - SUBJECTIVE AND OBJECTIVE BOX
Pt seen and examined   stepped down  BP better  pain better    REVIEW OF SYSTEMS:  Constitutional: No fever,   Cardiovascular: No chest pain, palpitations, dizziness  Gastrointestinal: less pain. No nausea, vomiting is; No diarrhea or constipation. No melena or hematochezia.  Skin: No itching, burning, rashes or lesions       MEDICATIONS:  MEDICATIONS  (STANDING):  acetaminophen     Tablet .. 650 milliGRAM(s) Oral every 6 hours  chlorhexidine 2% Cloths 1 Application(s) Topical <User Schedule>  enoxaparin Injectable 40 milliGRAM(s) SubCutaneous every 24 hours  lactated ringers. 1000 milliLiter(s) (80 mL/Hr) IV Continuous <Continuous>  pantoprazole  Injectable 40 milliGRAM(s) IV Push every 12 hours  piperacillin/tazobactam IVPB.. 3.375 Gram(s) IV Intermittent every 6 hours    MEDICATIONS  (PRN):  aluminum hydroxide/magnesium hydroxide/simethicone Suspension 30 milliLiter(s) Oral every 4 hours PRN Dyspepsia  traMADol 25 milliGRAM(s) Oral every 6 hours PRN Moderate Pain (4 - 6)      Allergies    No Known Allergies    Intolerances        Vital Signs Last 24 Hrs  T(C): 36.6 (13 Mar 2022 12:00), Max: 37.2 (12 Mar 2022 18:21)  T(F): 97.8 (13 Mar 2022 12:00), Max: 98.9 (12 Mar 2022 18:21)  HR: 55 (13 Mar 2022 12:00) (52 - 65)  BP: 91/57 (13 Mar 2022 12:00) (84/47 - 118/57)  BP(mean): 69 (13 Mar 2022 11:00) (61 - 84)  RR: 20 (13 Mar 2022 12:00) (16 - 41)  SpO2: 98% (13 Mar 2022 12:00) (95% - 100%)     @ 06:01  -   @ 07:00  --------------------------------------------------------  IN: 1733.4 mL / OUT: 1140 mL / NET: 593.4 mL     @ 07:01  -   @ 16:05  --------------------------------------------------------  IN: 575 mL / OUT: 65 mL / NET: 510 mL        PHYSICAL EXAM:    General: ; in no acute distress  HEENT: MMM, conjunctiva and sclera clear  Lungs: clear  Heart: regular  Gastrointestinal: Soft non-tender non-distended; Normal bowel sounds;  Skin: Warm and dry. No obvious rash    LABS:      CBC Full  -  ( 13 Mar 2022 06:14 )  WBC Count : 5.15 K/uL  RBC Count : 3.51 M/uL  Hemoglobin : 11.1 g/dL  Hematocrit : 35.6 %  Platelet Count - Automated : 134 K/uL  Mean Cell Volume : 101.4 fl  Mean Cell Hemoglobin : 31.6 pg  Mean Cell Hemoglobin Concentration : 31.2 gm/dL  Auto Neutrophil # : 3.63 K/uL  Auto Lymphocyte # : 0.87 K/uL  Auto Monocyte # : 0.49 K/uL  Auto Eosinophil # : 0.11 K/uL  Auto Basophil # : 0.03 K/uL  Auto Neutrophil % : 70.5 %  Auto Lymphocyte % : 16.9 %  Auto Monocyte % : 9.5 %  Auto Eosinophil % : 2.1 %  Auto Basophil % : 0.6 %        142  |  109<H>  |  8   ----------------------------<  73  3.4<L>   |  23  |  0.62    Ca    7.7<L>      13 Mar 2022 06:14  Phos  2.3       Mg     2.0         TPro  4.7<L>  /  Alb  2.6<L>  /  TBili  0.5  /  DBili  x   /  AST  21  /  ALT  25  /  AlkPhos  39<L>            Urinalysis Basic - ( 11 Mar 2022 16:52 )    Color: Yellow / Appearance: Clear / S.015 / pH: x  Gluc: x / Ketone: NEGATIVE  / Bili: Negative / Urobili: 0.2 E.U./dL   Blood: x / Protein: Trace mg/dL / Nitrite: NEGATIVE   Leuk Esterase: NEGATIVE / RBC: < 5 /HPF / WBC 5-10 /HPF   Sq Epi: x / Non Sq Epi: 0-5 /HPF / Bacteria: Many /HPF                RADIOLOGY & ADDITIONAL STUDIES (The following images were personally reviewed):

## 2022-03-13 NOTE — PROGRESS NOTE ADULT - SUBJECTIVE AND OBJECTIVE BOX
TRANSFER NOTE MICU TO [facility]  HOSPITAL COURSE  60 yo F with a past medical Hx of HTN, asthma, RA, prior ovarian CA s/p DENNIS with BSO, and umbilical hernia repair, who presents from home due to epigastric pain since yesterday evening. Patient reports having acute epigastric pain with nausea and NBNB vomiting after eating takeout Chinese food yesterday evening. Patient attempted OTC Mylanta, which improved her abdominal pain initially, but came into the ED later today due to persistent abdominal pain. Upon arrival, pt was febrile to 101.8, BP 73/44, admitted for septic shock 2/2 gastroenteritis, r/o acute cholecystitis, was started on vancomycin (later discontinued since MRSA negative) and zosyn. US abdomen showed echogenic renal parenchyma bilaterally, consistent with medical renal disease. Small stones and a moderate amount of sludge within the gallbladder. New 1.2 cm echogenic mass left kidney. Pt underwent HIDA scan which showed filling in the gallbladder, surgery consulted and r/o acute cholecystitis based on US findings. GI consulted and concern for gastroenteritis. Pt with minimal epigastric pain on 3/13, off pressors, HD stable and stepped down to [facility].     VITAL SIGNS:  T(F): 97.8 (22 @ 09:24)  HR: 57 (22 @ 11:00)  BP: 93/54 (22 @ 11:00)  RR: 22 (22 @ 11:00)  SpO2: 98% (22 @ 11:00)  Wt(kg): --    PHYSICAL EXAM:  General: NAD, Laying comfortably in bed  HEENT: NC/AT, anicteric sclera, MMM  Neck: supple  Cardiovascular: +S1/S2, RRR, No murmurs, rubs, gallops  Respiratory: CTA B/L, no W/R/R  Gastrointestinal: soft, ND, tender in epigastric area, BSx4.   Extremities: WWP, no edema, clubbing or cyanosis  Vascular: 2+ radial, DP/PT pulses B/L  Neurological: AAOx3, no focal deficits    MEDICATIONS  (STANDING):  acetaminophen     Tablet .. 650 milliGRAM(s) Oral every 6 hours  chlorhexidine 2% Cloths 1 Application(s) Topical <User Schedule>  enoxaparin Injectable 40 milliGRAM(s) SubCutaneous every 24 hours  pantoprazole  Injectable 40 milliGRAM(s) IV Push every 12 hours  piperacillin/tazobactam IVPB.. 3.375 Gram(s) IV Intermittent every 6 hours    MEDICATIONS  (PRN):  aluminum hydroxide/magnesium hydroxide/simethicone Suspension 30 milliLiter(s) Oral every 4 hours PRN Dyspepsia  traMADol 25 milliGRAM(s) Oral every 6 hours PRN Moderate Pain (4 - 6)      Allergies    No Known Allergies    Intolerances        LABS:                        11.1   5.15  )-----------( 134      ( 13 Mar 2022 06:14 )             35.6     03-13    142  |  109<H>  |  8   ----------------------------<  73  3.4<L>   |  23  |  0.62    Ca    7.7<L>      13 Mar 2022 06:14  Phos  2.3     03-13  Mg     2.0     03-13    TPro  4.7<L>  /  Alb  2.6<L>  /  TBili  0.5  /  DBili  x   /  AST  21  /  ALT  25  /  AlkPhos  39<L>  03-13    PT/INR - ( 11 Mar 2022 15:20 )   PT: 11.8 sec;   INR: 0.99          PTT - ( 11 Mar 2022 15:20 )  PTT:30.2 sec  Urinalysis Basic - ( 11 Mar 2022 16:52 )    Color: Yellow / Appearance: Clear / S.015 / pH: x  Gluc: x / Ketone: NEGATIVE  / Bili: Negative / Urobili: 0.2 E.U./dL   Blood: x / Protein: Trace mg/dL / Nitrite: NEGATIVE   Leuk Esterase: NEGATIVE / RBC: < 5 /HPF / WBC 5-10 /HPF   Sq Epi: x / Non Sq Epi: 0-5 /HPF / Bacteria: Many /HPF        RADIOLOGY & ADDITIONAL TESTS:  Reviewed

## 2022-03-13 NOTE — PROGRESS NOTE ADULT - ASSESSMENT
60 yo F with a past medical Hx of HTN, asthma, RA, prior ovarian CA s/p DENNSI with BSO, and umbilical hernia repair, who presents from home due to epigastric pain with emesis, found to be in septic shock 2/2 gastroenteritis, on Abx, now off pressors, HD stable

## 2022-03-13 NOTE — PROGRESS NOTE ADULT - PROBLEM SELECTOR PLAN 4
At home on losartan.  - holding in the setting of sepsis At home on losartan.  - holding in the setting of sepsis and current hypotension

## 2022-03-14 ENCOUNTER — TRANSCRIPTION ENCOUNTER (OUTPATIENT)
Age: 62
End: 2022-03-14

## 2022-03-14 VITALS
DIASTOLIC BLOOD PRESSURE: 74 MMHG | SYSTOLIC BLOOD PRESSURE: 113 MMHG | HEART RATE: 65 BPM | OXYGEN SATURATION: 98 % | TEMPERATURE: 98 F | RESPIRATION RATE: 18 BRPM

## 2022-03-14 LAB
ANION GAP SERPL CALC-SCNC: 7 MMOL/L — SIGNIFICANT CHANGE UP (ref 5–17)
BASOPHILS # BLD AUTO: 0.03 K/UL — SIGNIFICANT CHANGE UP (ref 0–0.2)
BASOPHILS NFR BLD AUTO: 0.7 % — SIGNIFICANT CHANGE UP (ref 0–2)
BUN SERPL-MCNC: 5 MG/DL — LOW (ref 7–23)
CALCIUM SERPL-MCNC: 8 MG/DL — LOW (ref 8.4–10.5)
CHLORIDE SERPL-SCNC: 109 MMOL/L — HIGH (ref 96–108)
CO2 SERPL-SCNC: 25 MMOL/L — SIGNIFICANT CHANGE UP (ref 22–31)
CREAT SERPL-MCNC: 0.65 MG/DL — SIGNIFICANT CHANGE UP (ref 0.5–1.3)
CULTURE RESULTS: SIGNIFICANT CHANGE UP
EGFR: 99 ML/MIN/1.73M2 — SIGNIFICANT CHANGE UP
EOSINOPHIL # BLD AUTO: 0.14 K/UL — SIGNIFICANT CHANGE UP (ref 0–0.5)
EOSINOPHIL NFR BLD AUTO: 3.3 % — SIGNIFICANT CHANGE UP (ref 0–6)
GLUCOSE SERPL-MCNC: 103 MG/DL — HIGH (ref 70–99)
HCT VFR BLD CALC: 34.7 % — SIGNIFICANT CHANGE UP (ref 34.5–45)
HGB BLD-MCNC: 11.5 G/DL — SIGNIFICANT CHANGE UP (ref 11.5–15.5)
IMM GRANULOCYTES NFR BLD AUTO: 0.2 % — SIGNIFICANT CHANGE UP (ref 0–1.5)
LYMPHOCYTES # BLD AUTO: 1.16 K/UL — SIGNIFICANT CHANGE UP (ref 1–3.3)
LYMPHOCYTES # BLD AUTO: 27.8 % — SIGNIFICANT CHANGE UP (ref 13–44)
MAGNESIUM SERPL-MCNC: 1.9 MG/DL — SIGNIFICANT CHANGE UP (ref 1.6–2.6)
MCHC RBC-ENTMCNC: 32.7 PG — SIGNIFICANT CHANGE UP (ref 27–34)
MCHC RBC-ENTMCNC: 33.1 GM/DL — SIGNIFICANT CHANGE UP (ref 32–36)
MCV RBC AUTO: 98.6 FL — SIGNIFICANT CHANGE UP (ref 80–100)
MONOCYTES # BLD AUTO: 0.41 K/UL — SIGNIFICANT CHANGE UP (ref 0–0.9)
MONOCYTES NFR BLD AUTO: 9.8 % — SIGNIFICANT CHANGE UP (ref 2–14)
NEUTROPHILS # BLD AUTO: 2.43 K/UL — SIGNIFICANT CHANGE UP (ref 1.8–7.4)
NEUTROPHILS NFR BLD AUTO: 58.2 % — SIGNIFICANT CHANGE UP (ref 43–77)
NRBC # BLD: 0 /100 WBCS — SIGNIFICANT CHANGE UP (ref 0–0)
PHOSPHATE SERPL-MCNC: 1.9 MG/DL — LOW (ref 2.5–4.5)
PLATELET # BLD AUTO: 131 K/UL — LOW (ref 150–400)
POTASSIUM SERPL-MCNC: 3.8 MMOL/L — SIGNIFICANT CHANGE UP (ref 3.5–5.3)
POTASSIUM SERPL-SCNC: 3.8 MMOL/L — SIGNIFICANT CHANGE UP (ref 3.5–5.3)
RBC # BLD: 3.52 M/UL — LOW (ref 3.8–5.2)
RBC # FLD: 12.2 % — SIGNIFICANT CHANGE UP (ref 10.3–14.5)
SODIUM SERPL-SCNC: 141 MMOL/L — SIGNIFICANT CHANGE UP (ref 135–145)
SPECIMEN SOURCE: SIGNIFICANT CHANGE UP
WBC # BLD: 4.18 K/UL — SIGNIFICANT CHANGE UP (ref 3.8–10.5)
WBC # FLD AUTO: 4.18 K/UL — SIGNIFICANT CHANGE UP (ref 3.8–10.5)

## 2022-03-14 PROCEDURE — 82306 VITAMIN D 25 HYDROXY: CPT

## 2022-03-14 PROCEDURE — 86140 C-REACTIVE PROTEIN: CPT

## 2022-03-14 PROCEDURE — A9537: CPT

## 2022-03-14 PROCEDURE — U0003: CPT

## 2022-03-14 PROCEDURE — 84132 ASSAY OF SERUM POTASSIUM: CPT

## 2022-03-14 PROCEDURE — 82533 TOTAL CORTISOL: CPT

## 2022-03-14 PROCEDURE — 82310 ASSAY OF CALCIUM: CPT

## 2022-03-14 PROCEDURE — 93306 TTE W/DOPPLER COMPLETE: CPT

## 2022-03-14 PROCEDURE — 85027 COMPLETE CBC AUTOMATED: CPT

## 2022-03-14 PROCEDURE — 82803 BLOOD GASES ANY COMBINATION: CPT

## 2022-03-14 PROCEDURE — 87045 FECES CULTURE AEROBIC BACT: CPT

## 2022-03-14 PROCEDURE — 82330 ASSAY OF CALCIUM: CPT

## 2022-03-14 PROCEDURE — 80048 BASIC METABOLIC PNL TOTAL CA: CPT

## 2022-03-14 PROCEDURE — 80053 COMPREHEN METABOLIC PANEL: CPT

## 2022-03-14 PROCEDURE — 86803 HEPATITIS C AB TEST: CPT

## 2022-03-14 PROCEDURE — 86850 RBC ANTIBODY SCREEN: CPT

## 2022-03-14 PROCEDURE — 85610 PROTHROMBIN TIME: CPT

## 2022-03-14 PROCEDURE — 87086 URINE CULTURE/COLONY COUNT: CPT

## 2022-03-14 PROCEDURE — 82550 ASSAY OF CK (CPK): CPT

## 2022-03-14 PROCEDURE — 83690 ASSAY OF LIPASE: CPT

## 2022-03-14 PROCEDURE — 81001 URINALYSIS AUTO W/SCOPE: CPT

## 2022-03-14 PROCEDURE — 83970 ASSAY OF PARATHORMONE: CPT

## 2022-03-14 PROCEDURE — 99285 EMERGENCY DEPT VISIT HI MDM: CPT | Mod: 25

## 2022-03-14 PROCEDURE — 74177 CT ABD & PELVIS W/CONTRAST: CPT | Mod: MA

## 2022-03-14 PROCEDURE — 84484 ASSAY OF TROPONIN QUANT: CPT

## 2022-03-14 PROCEDURE — 83605 ASSAY OF LACTIC ACID: CPT

## 2022-03-14 PROCEDURE — 96361 HYDRATE IV INFUSION ADD-ON: CPT

## 2022-03-14 PROCEDURE — 85730 THROMBOPLASTIN TIME PARTIAL: CPT

## 2022-03-14 PROCEDURE — 82553 CREATINE MB FRACTION: CPT

## 2022-03-14 PROCEDURE — 96375 TX/PRO/DX INJ NEW DRUG ADDON: CPT

## 2022-03-14 PROCEDURE — 36415 COLL VENOUS BLD VENIPUNCTURE: CPT

## 2022-03-14 PROCEDURE — 96365 THER/PROPH/DIAG IV INF INIT: CPT

## 2022-03-14 PROCEDURE — 93005 ELECTROCARDIOGRAM TRACING: CPT

## 2022-03-14 PROCEDURE — 78226 HEPATOBILIARY SYSTEM IMAGING: CPT

## 2022-03-14 PROCEDURE — 71045 X-RAY EXAM CHEST 1 VIEW: CPT

## 2022-03-14 PROCEDURE — 85652 RBC SED RATE AUTOMATED: CPT

## 2022-03-14 PROCEDURE — 76700 US EXAM ABDOM COMPLETE: CPT

## 2022-03-14 PROCEDURE — 82150 ASSAY OF AMYLASE: CPT

## 2022-03-14 PROCEDURE — 85025 COMPLETE CBC W/AUTO DIFF WBC: CPT

## 2022-03-14 PROCEDURE — U0005: CPT

## 2022-03-14 PROCEDURE — 87040 BLOOD CULTURE FOR BACTERIA: CPT

## 2022-03-14 PROCEDURE — 87507 IADNA-DNA/RNA PROBE TQ 12-25: CPT

## 2022-03-14 PROCEDURE — 87046 STOOL CULTR AEROBIC BACT EA: CPT

## 2022-03-14 PROCEDURE — 83735 ASSAY OF MAGNESIUM: CPT

## 2022-03-14 PROCEDURE — 84100 ASSAY OF PHOSPHORUS: CPT

## 2022-03-14 PROCEDURE — 84145 PROCALCITONIN (PCT): CPT

## 2022-03-14 PROCEDURE — 84295 ASSAY OF SERUM SODIUM: CPT

## 2022-03-14 PROCEDURE — 86900 BLOOD TYPING SEROLOGIC ABO: CPT

## 2022-03-14 PROCEDURE — 71275 CT ANGIOGRAPHY CHEST: CPT

## 2022-03-14 PROCEDURE — 86901 BLOOD TYPING SEROLOGIC RH(D): CPT

## 2022-03-14 RX ORDER — HYDROXYCHLOROQUINE SULFATE 200 MG
1 TABLET ORAL
Qty: 0 | Refills: 0 | DISCHARGE

## 2022-03-14 RX ORDER — ALENDRONATE SODIUM 70 MG/1
1 TABLET ORAL
Qty: 0 | Refills: 0 | DISCHARGE

## 2022-03-14 RX ORDER — ASPIRIN/CALCIUM CARB/MAGNESIUM 324 MG
1 TABLET ORAL
Qty: 0 | Refills: 0 | DISCHARGE
Start: 2022-03-14

## 2022-03-14 RX ADMIN — Medication 650 MILLIGRAM(S): at 01:45

## 2022-03-14 RX ADMIN — PIPERACILLIN AND TAZOBACTAM 200 GRAM(S): 4; .5 INJECTION, POWDER, LYOPHILIZED, FOR SOLUTION INTRAVENOUS at 07:47

## 2022-03-14 RX ADMIN — PANTOPRAZOLE SODIUM 40 MILLIGRAM(S): 20 TABLET, DELAYED RELEASE ORAL at 06:02

## 2022-03-14 RX ADMIN — Medication 650 MILLIGRAM(S): at 07:00

## 2022-03-14 RX ADMIN — Medication 10 MILLIGRAM(S): at 00:36

## 2022-03-14 RX ADMIN — Medication 650 MILLIGRAM(S): at 12:04

## 2022-03-14 RX ADMIN — Medication 650 MILLIGRAM(S): at 00:45

## 2022-03-14 RX ADMIN — PIPERACILLIN AND TAZOBACTAM 200 GRAM(S): 4; .5 INJECTION, POWDER, LYOPHILIZED, FOR SOLUTION INTRAVENOUS at 02:00

## 2022-03-14 RX ADMIN — Medication 650 MILLIGRAM(S): at 13:00

## 2022-03-14 RX ADMIN — CHLORHEXIDINE GLUCONATE 1 APPLICATION(S): 213 SOLUTION TOPICAL at 07:15

## 2022-03-14 RX ADMIN — Medication 650 MILLIGRAM(S): at 06:00

## 2022-03-14 RX ADMIN — ENOXAPARIN SODIUM 40 MILLIGRAM(S): 100 INJECTION SUBCUTANEOUS at 06:05

## 2022-03-14 NOTE — PROGRESS NOTE ADULT - SUBJECTIVE AND OBJECTIVE BOX
Subjective: no acute complaints. Reports pain is improving. Has a little bit of nausea, and plans to try more po today. Tolerating liquids.     MEDICATIONS  (STANDING):  acetaminophen     Tablet .. 650 milliGRAM(s) Oral every 6 hours  chlorhexidine 2% Cloths 1 Application(s) Topical <User Schedule>  enoxaparin Injectable 40 milliGRAM(s) SubCutaneous every 24 hours  lactated ringers. 1000 milliLiter(s) (80 mL/Hr) IV Continuous <Continuous>  pantoprazole  Injectable 40 milliGRAM(s) IV Push every 12 hours  piperacillin/tazobactam IVPB.. 3.375 Gram(s) IV Intermittent every 6 hours    MEDICATIONS  (PRN):  aluminum hydroxide/magnesium hydroxide/simethicone Suspension 30 milliLiter(s) Oral every 4 hours PRN Dyspepsia  traMADol 25 milliGRAM(s) Oral every 6 hours PRN Moderate Pain (4 - 6)      Vital Signs Last 24 Hrs  T(C): 36.9 (14 Mar 2022 05:59), Max: 36.9 (14 Mar 2022 05:59)  T(F): 98.4 (14 Mar 2022 05:59), Max: 98.4 (14 Mar 2022 05:59)  HR: 63 (14 Mar 2022 05:59) (55 - 63)  BP: 143/84 (14 Mar 2022 05:59) (91/57 - 143/84)  BP(mean): --  RR: 18 (14 Mar 2022 05:59) (18 - 20)  SpO2: 96% (14 Mar 2022 05:59) (96% - 98%)    Physical Exam  General: NAD, resting comfortably in bed  Pulm: Nonlabored breathing, no respiratory distress  Abd: soft, ND, NT  Extrem: WWP, no edema  Neuro: A/O x 3, CNs II-XII grossly intact, no focal deficits, normal sensation      I&O's Summary    13 Mar 2022 07:01  -  14 Mar 2022 07:00  --------------------------------------------------------  IN: 1055 mL / OUT: 65 mL / NET: 990 mL                              11.5   4.18  )-----------( 131      ( 14 Mar 2022 07:06 )             34.7       03-14    141  |  109<H>  |  5<L>  ----------------------------<  103<H>  3.8   |  25  |  0.65    Ca    8.0<L>      14 Mar 2022 07:06  Phos  1.9     03-14  Mg     1.9     03-14    TPro  4.7<L>  /  Alb  2.6<L>  /  TBili  0.5  /  DBili  x   /  AST  21  /  ALT  25  /  AlkPhos  39<L>  03-13      Micro:  GI PCR Panel, Stool (03.14.22 @ 04:44)    Culture Results:   GI PCR Results: NOT detected  *******Please Note:*******  GI panel PCR evaluates for:  Campylobacter, Plesiomonas shigelloides, Salmonella,  Vibrio, Yersinia enterocolitica, Enteroaggregative  Escherichia coli (EAEC), Enteropathogenic E.coli (EPEC),  Enterotoxigenic E. coli (ETEC) lt/st, Shiga-like  toxin-producing E. coli (STEC) stx1/stx2,  Shigella/ Enteroinvasive E. coli (EIEC), Cryptosporidium,  Cyclospora cayetanensis, Entamoeba histolytica,  Giardia lamblia, Adenovirus F 40/41, Astrovirus,  Norovirus GI/GII, Rotavirus A, Sapovirus        Imaging:   < from: [facility] Hepatobiliary [facility] (03.12.22 @ 15:21) >  Normal hepatobiliary scan.

## 2022-03-14 NOTE — PROGRESS NOTE ADULT - ASSESSMENT
61F with a past medical Hx of HTN, asthma, RA, prior ovarian CA s/p DENNIS with BSO, and umbilical hernia repair, who presents from home due to epigastric pain since Thursday evening. General surgery consulted to rule out cholecystitis, which is unlikely given imaging findings from US (3/11), CTAP (3/12) and primary read from HIDA (3/12), showing no supportive evidence.    - HIDA negative  - No cholecystitis given US, CT, and HIDA findings  - Surgery team 4c signing off  - Discussed with Dr. Rankin

## 2022-03-14 NOTE — PROGRESS NOTE ADULT - SUBJECTIVE AND OBJECTIVE BOX
no pain/minimal  + BM    Pt seen and examined     REVIEW OF SYSTEMS:  Constitutional: No fever, weight loss or fatigue  Cardiovascular: No chest pain, palpitations, dizziness or leg swelling  Gastrointestinal: No abdominal or epigastric pain. No nausea, vomiting or hematemesis; No diarrhea or constipation. No melena or hematochezia.  Skin: No itching, burning, rashes or lesions       MEDICATIONS:  MEDICATIONS  (STANDING):  acetaminophen     Tablet .. 650 milliGRAM(s) Oral every 6 hours  chlorhexidine 2% Cloths 1 Application(s) Topical <User Schedule>  enoxaparin Injectable 40 milliGRAM(s) SubCutaneous every 24 hours  lactated ringers. 1000 milliLiter(s) (80 mL/Hr) IV Continuous <Continuous>  pantoprazole  Injectable 40 milliGRAM(s) IV Push every 12 hours  piperacillin/tazobactam IVPB.. 3.375 Gram(s) IV Intermittent every 6 hours    MEDICATIONS  (PRN):  aluminum hydroxide/magnesium hydroxide/simethicone Suspension 30 milliLiter(s) Oral every 4 hours PRN Dyspepsia  traMADol 25 milliGRAM(s) Oral every 6 hours PRN Moderate Pain (4 - 6)      Allergies    No Known Allergies    Intolerances        Vital Signs Last 24 Hrs  T(C): 36.9 (14 Mar 2022 05:59), Max: 36.9 (14 Mar 2022 05:59)  T(F): 98.4 (14 Mar 2022 05:59), Max: 98.4 (14 Mar 2022 05:59)  HR: 63 (14 Mar 2022 05:59) (55 - 63)  BP: 143/84 (14 Mar 2022 05:59) (91/57 - 143/84)  BP(mean): 69 (13 Mar 2022 11:00) (69 - 69)  RR: 18 (14 Mar 2022 05:59) (18 - 22)  SpO2: 96% (14 Mar 2022 05:59) (96% - 98%)    03-13 @ 07:01  -  03-14 @ 07:00  --------------------------------------------------------  IN: 1055 mL / OUT: 65 mL / NET: 990 mL        PHYSICAL EXAM:    General:  in no acute distress  HEENT: MMM, conjunctiva and sclera clear  Lungs: clear  Heart: regular  Gastrointestinal: Soft non-tender non-distended; Normal bowel sounds; No hepatosplenomegaly  Skin: Warm and dry. No obvious rash  Ext: ulnar deviation of digits due to RA    LABS:      CBC Full  -  ( 14 Mar 2022 07:06 )  WBC Count : 4.18 K/uL  RBC Count : 3.52 M/uL  Hemoglobin : 11.5 g/dL  Hematocrit : 34.7 %  Platelet Count - Automated : 131 K/uL  Mean Cell Volume : 98.6 fl  Mean Cell Hemoglobin : 32.7 pg  Mean Cell Hemoglobin Concentration : 33.1 gm/dL  Auto Neutrophil # : 2.43 K/uL  Auto Lymphocyte # : 1.16 K/uL  Auto Monocyte # : 0.41 K/uL  Auto Eosinophil # : 0.14 K/uL  Auto Basophil # : 0.03 K/uL  Auto Neutrophil % : 58.2 %  Auto Lymphocyte % : 27.8 %  Auto Monocyte % : 9.8 %  Auto Eosinophil % : 3.3 %  Auto Basophil % : 0.7 %    03-14    141  |  109<H>  |  5<L>  ----------------------------<  103<H>  3.8   |  25  |  0.65    Ca    8.0<L>      14 Mar 2022 07:06  Phos  1.9     03-14  Mg     1.9     03-14    TPro  4.7<L>  /  Alb  2.6<L>  /  TBili  0.5  /  DBili  x   /  AST  21  /  ALT  25  /  AlkPhos  39<L>  03-13                      RADIOLOGY & ADDITIONAL STUDIES (The following images were personally reviewed):

## 2022-03-14 NOTE — DISCHARGE NOTE PROVIDER - HOSPITAL COURSE
#Discharge: do not delete    Patient is a 62 yo F with a past medical Hx of HTN, asthma, RA, prior ovarian CA s/p DENNIS with BSO, and umbilical hernia repair, who presents from home due to epigastric pain since yesterday evening. Patient reports having acute epigastric pain with nausea and NBNB vomiting after eating takeout Chinese food yesterday evening. Patient attempted OTC Mylanta, which improved her abdominal pain initially, but came into the ED later today due to persistent abdominal pain. Upon arrival, pt was febrile to 101.8, BP 73/44, admitted for septic shock 2/2 gastroenteritis, r/o acute cholecystitis, was started on vancomycin (later discontinued since MRSA negative) and zosyn. US abdomen showed echogenic renal parenchyma bilaterally, consistent with medical renal disease. Small stones and a moderate amount of sludge within the gallbladder. New 1.2 cm echogenic mass left kidney. Pt underwent HIDA scan which showed filling in the gallbladder, surgery consulted and r/o acute cholecystitis based on US findings. GI consulted and concern for gastroenteritis. Pt with minimal epigastric pain on 3/13, off pressors, HD stable and stepped down to RMF.  Patient is feeling well while on RMF and stable for discharge to home.     Discharge to: home     Problem List/Main Diagnoses:     #Septic shock. [RESOLVED]   on ad mission Patient febrile, leukocytosis and hypotensive requiring pressors possibly 2/2 gastroenteritis    #Gastroenteritis.   Hx of GERD. Presents with acute epigastric pain x1 day. US abdomen 3/11: Small stones and a moderate amount of sludge within the gallbladder. CT Abdomen 3/12:  severe extensive inflammatory thickening of the wall of numerous contiguous loops of proximal and mid small bowel, compatible with severe enteritis. No specific ancillary findings of bowel ischemia.  Gallbladder is markedly distended and there is dependent cholelithiasis. No wall thickening or pericholecystic inflammation. Surgery consulted, recs appreciated > no acute interventions. GI consulted, rec appreciated. HIDA normal. IV antibiotics on zosyn.   - f/u outpatient     #Hypocalcemia.    PTH - 102; Ionized calcium - 1.08; Vit D 25: 55.0; Vit 1, 25 -   - f/u outpatient     #HTN (hypertension).   At home on losartan.  - holding in the setting of sepsis and current hypotension.    #Rheumatoid arthritis.   At home on plaquenil  - c/w home plaquenil       New medications/therapies:   New lines/hardware:  Labs to be followed outpatient:   Exam to be followed outpatient:   Outpatient appts:

## 2022-03-14 NOTE — DISCHARGE NOTE PROVIDER - NSDCMRMEDTOKEN_GEN_ALL_CORE_FT
acetaminophen 325 mg oral tablet: 2 tab(s) orally every 6 hours, As needed, Mild Pain (1 - 3)  aspirin 325 mg oral delayed release tablet: 1 tab(s) orally every 12 hours  Start on 9/12/18  atorvastatin 20 mg oral tablet: 1 tab(s) orally once a day (at bedtime)  cholecalciferol oral tablet: 1000 unit(s) orally once a day  docusate sodium 100 mg oral capsule: 1 cap(s) orally 3 times a day  enoxaparin: 40mg subcutaneous injection daily for 14 days after surgery.  Last day of administration: 9/11/18.  Then start ecotrin 325mg mg bid for an additional 4weeks  losartan 25 mg oral tablet: 1 tab(s) orally once a day  Multiple Vitamins oral tablet: 1 tab(s) orally once a day  oxyCODONE 10 mg oral tablet: 1 tab(s) orally every 6 hours, As Needed -for severe pain MDD:40 mg  pantoprazole 40 mg oral delayed release tablet: 1 tab(s) orally once a day (before a meal)     TAKE WHILE TAKING ASPIRIN 325 mg, then discontinue   polyethylene glycol 3350 oral powder for reconstitution: 17 gram(s) orally once a day  senna oral tablet: 2 tab(s) orally once a day (at bedtime)   Adult Aspirin Regimen 81 mg oral delayed release tablet: 1 tab(s) orally once a day  atorvastatin 20 mg oral tablet: 1 tab(s) orally once a day (at bedtime)  cholecalciferol oral tablet: 1000 unit(s) orally once a day  Multiple Vitamins oral tablet: 1 tab(s) orally once a day   Adult Aspirin Regimen 81 mg oral delayed release tablet: 1 tab(s) orally once a day  alendronate 35 mg oral tablet: 1 tab(s) orally once a week  atorvastatin 20 mg oral tablet: 1 tab(s) orally once a day (at bedtime)  cholecalciferol oral tablet: 1000 unit(s) orally once a day  hydroxychloroquine 200 mg oral tablet: 1 tab(s) orally once a day  Multiple Vitamins oral tablet: 1 tab(s) orally once a day

## 2022-03-14 NOTE — DISCHARGE NOTE PROVIDER - NSDCCPCAREPLAN_GEN_ALL_CORE_FT
PRINCIPAL DISCHARGE DIAGNOSIS  Diagnosis: Gastroenteritis  Assessment and Plan of Treatment: You have been diagnosed with gastroenteritis which is a viral infection of the intestines. We believe that this was the couse of your vomiting and abdominal pain. You were treated with antibiotics in the hospital and had multiple types of images taken to ensure there was no other reason for the pain. Please continue to try to eat as tolerated. You do not need to take anymore antibiotics. Please follow up with your primary care physician.         SECONDARY DISCHARGE DIAGNOSES  Diagnosis: HTN (hypertension)  Assessment and Plan of Treatment: You have been diagnosed with high blood pressure for which you take Losartan. You have had low blood pressures during your stay in the hospital. This is expected with vomiting and infections, however please do not take your blood pressure medications until your follow up appointment with your primary care provider. We are advising you to pause taking Losartan to avoid your blood pressure from going too low which can cause dizziness, lightheadedness, and falls.

## 2022-03-14 NOTE — DISCHARGE NOTE NURSING/CASE MANAGEMENT/SOCIAL WORK - NSDCPEFALRISK_GEN_ALL_CORE
For information on Fall & Injury Prevention, visit: [email or web address] OR  [email or web address] OR  https://www.cdc.gov/steadi/patient.html

## 2022-03-14 NOTE — DISCHARGE NOTE PROVIDER - CARE PROVIDER_API CALL
Abdirashid White)  [facility]  [address], [address]  [place] [address]  Phone: [phone]  Fax: [phone]  Follow Up Time: 2 weeks

## 2022-03-14 NOTE — DISCHARGE NOTE NURSING/CASE MANAGEMENT/SOCIAL WORK - PATIENT PORTAL LINK FT
You can access the FollowMyHealth Patient Portal offered by [facility] by registering at the following website: [email or web address]. By joining [employer]’s FollowMyHealth portal, you will also be able to view your health information using other applications (apps) compatible with our system.

## 2022-03-16 DIAGNOSIS — I95.9 HYPOTENSION, UNSPECIFIED: ICD-10-CM

## 2022-03-16 DIAGNOSIS — R53.1 WEAKNESS: ICD-10-CM

## 2022-03-16 DIAGNOSIS — K52.9 NONINFECTIVE GASTROENTERITIS AND COLITIS, UNSPECIFIED: ICD-10-CM

## 2022-03-16 DIAGNOSIS — R65.21 SEVERE SEPSIS WITH SEPTIC SHOCK: ICD-10-CM

## 2022-03-16 DIAGNOSIS — E83.51 HYPOCALCEMIA: ICD-10-CM

## 2022-03-16 DIAGNOSIS — I10 ESSENTIAL (PRIMARY) HYPERTENSION: ICD-10-CM

## 2022-03-16 DIAGNOSIS — K80.20 CALCULUS OF GALLBLADDER WITHOUT CHOLECYSTITIS WITHOUT OBSTRUCTION: ICD-10-CM

## 2022-03-16 DIAGNOSIS — A41.9 SEPSIS, UNSPECIFIED ORGANISM: ICD-10-CM

## 2022-03-16 DIAGNOSIS — Q79.1 OTHER CONGENITAL MALFORMATIONS OF DIAPHRAGM: ICD-10-CM

## 2022-03-16 DIAGNOSIS — J45.909 UNSPECIFIED ASTHMA, UNCOMPLICATED: ICD-10-CM

## 2022-03-16 DIAGNOSIS — M06.9 RHEUMATOID ARTHRITIS, UNSPECIFIED: ICD-10-CM

## 2022-03-16 DIAGNOSIS — R09.02 HYPOXEMIA: ICD-10-CM

## 2022-03-16 DIAGNOSIS — K21.9 GASTRO-ESOPHAGEAL REFLUX DISEASE WITHOUT ESOPHAGITIS: ICD-10-CM

## 2022-03-16 DIAGNOSIS — Z85.43 PERSONAL HISTORY OF MALIGNANT NEOPLASM OF OVARY: ICD-10-CM

## 2022-03-16 DIAGNOSIS — D18.03 HEMANGIOMA OF INTRA-ABDOMINAL STRUCTURES: ICD-10-CM

## 2022-03-16 DIAGNOSIS — K29.70 GASTRITIS, UNSPECIFIED, WITHOUT BLEEDING: ICD-10-CM

## 2022-03-16 LAB
CULTURE RESULTS: SIGNIFICANT CHANGE UP
SPECIMEN SOURCE: SIGNIFICANT CHANGE UP

## 2022-07-01 ENCOUNTER — OUTPATIENT (OUTPATIENT)
Dept: OUTPATIENT SERVICES | Facility: HOSPITAL | Age: 62
LOS: 1 days | End: 2022-07-01
Payer: COMMERCIAL

## 2022-07-01 ENCOUNTER — RESULT REVIEW (OUTPATIENT)
Age: 62
End: 2022-07-01

## 2022-07-01 ENCOUNTER — APPOINTMENT (OUTPATIENT)
Dept: ORTHOPEDIC SURGERY | Facility: CLINIC | Age: 62
End: 2022-07-01

## 2022-07-01 VITALS
WEIGHT: 124 LBS | SYSTOLIC BLOOD PRESSURE: 163 MMHG | DIASTOLIC BLOOD PRESSURE: 92 MMHG | BODY MASS INDEX: 25 KG/M2 | HEIGHT: 59 IN

## 2022-07-01 DIAGNOSIS — Z98.890 OTHER SPECIFIED POSTPROCEDURAL STATES: Chronic | ICD-10-CM

## 2022-07-01 DIAGNOSIS — Z41.9 ENCOUNTER FOR PROCEDURE FOR PURPOSES OTHER THAN REMEDYING HEALTH STATE, UNSPECIFIED: Chronic | ICD-10-CM

## 2022-07-01 PROCEDURE — 99213 OFFICE O/P EST LOW 20 MIN: CPT

## 2022-07-01 PROCEDURE — 73564 X-RAY EXAM KNEE 4 OR MORE: CPT

## 2022-07-01 PROCEDURE — 73564 X-RAY EXAM KNEE 4 OR MORE: CPT | Mod: 26,LT,RT

## 2022-07-01 NOTE — DISCUSSION/SUMMARY
[de-identified] : 59y/o female 4 years s/p bilateral TKA, doing well; with mildly symptomatic right knee prepatellar bursitis\par - Tylenol as needed\par - Cont HEP; AAOS packet given\par - Follow up next year with repeat bilateral knee XRs or earlier as needed for new complaints\par - Jury duty letter given

## 2022-07-01 NOTE — HISTORY OF PRESENT ILLNESS
[de-identified] : 7/1/22: 62 y/o female presents 4 years s/p B TKA by Dr. Gardner.  She states the left knee is dong very well. Reports the right knee becomes fatigued after about 15 minutes of activity. She states she currently walks primarily for exercise and is not currently active in a HEP. Notes persistent mild hip pain, asking for an excuse letter for jury duty.\par \par 7/21/21: 3yr postop from B TKA, Dr. Gardner. No pain, no limitations. Still walking 10k steps. She does have some persistent bilateral prepatellar soft tissue swelling a bit worse on the right, which she attributes to constantly bumping the patella onto one particular chair in her home. 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 Gardner, DOS 8/28/18. She bumped the right patella about a week ago and had some prepatellar swelling that is gradually improving. She also complains of some right knee "heaviness" when going up and down stairs. She is still able to do 10,000 steps a day. She also tries to spend an hour per day on her stationary bike. She complains of worsening low back pain since the start of the pandemic.

## 2022-07-01 NOTE — PHYSICAL EXAM
[de-identified] : General appearance: well nourished and hydrated, pleasant, alert and oriented x 3, cooperative.  \par HEENT: normocephalic, EOM intact, wearing mask, external auditory canal clear.  \par Cardiovascular: no lower leg edema, no varicosities, dorsalis pedis pulses palpable and symmetric.  \par Lymphatics: no palpable lymphadenopathy, no lymphedema.  \par Neurologic: sensation is normal, no muscle weakness in upper or lower extremities, patella tendon reflexes present and symmetric.  \par Dermatologic: skin moist, warm, no rash.  \par Spine: cervical spine with normal lordosis and painless range of motion, thoracic spine with normal kyphosis and painless range of motion, lumbosacral spine with normal lordosis and painless range of motion. \par Gait: normal.  \par \par Left knee:\par - Inspection: mild prepatellar swelling, negative ecchymosis and erythema.  \par - Wounds: healed midline incision.\par - Alignment: normal.  \par - Palpation: no specific tenderness on palpation.  \par - ROM active: 10 hyper - 140, no pain on extremes of motion\par - Ligamentous laxity: mild increased laxity to varus stress, stable to valgus stress. About 5mm ant/post translation on flexion drawers. \par - Popliteal angle: 45 degrees\par - Muscle Test: 5/5 quad strength.  \par \par Right knee:\par - Inspection: prepatellar bursitis. Negative ecchymosis and erythema.  \par - Wounds: healed midline incision.\par - Alignment: normal.  \par - Palpation: no specific tenderness on palpation.\par - ROM active: 10 hyper - 140, no pain on extremes of motion\par - Ligamentous laxity: stable to varus stress, stable to valgus stress. About 5mm ant/post translation on flexion drawers. \par - Popliteal angle: 45 degrees\par - Muscle Test: 5/5 quad strength.   [de-identified] : 4 views of the bilateral knees (weightbearing AP, weightbearing Chisholm, weightbearing lateral, and Sunrise) were obtained today and interpreted by me.\par \par The left knee has a cemented PS Persona with resurfaced patella in position. Components appear well fixed and alignment is normal. No evidence of fracture, osteolysis, or loosening. The patella sits at appropriate height and tracks centrally. No interval change from most recent imaging.\par \par The right knee has a cemented PS Persona with resurfaced patella in position. Components appear well fixed and alignment is normal. No evidence of fracture, osteolysis, or loosening. The patella sits at appropriate height and tracks centrally. No interval change from most recent imaging.

## 2022-07-07 NOTE — PATIENT PROFILE ADULT - NS PRO AD NO ADVANCE DIRECTIVE
Patient Instructions from Today's Visit    Reason for Visit:  Pre- chemo    Diagnosis Information:  [email or web address]  Patient was educated and given handouts published by [employer] entitled [employer] Answers Fact Sheets about their diagnosis of  during todays office visit. Plan: You will start taking your Revlimid when you get it in the mail. Its ok if this is a few days late!!! Let me know if you don't get it. We will give you another 24 hour urine jug. You will have your Larance Pitcher today too. We will give you B12 today and next week. Then add it to day 1 of every cycle. We will also give you 2 grams of Mag IV today. We will need to get your PET end of July. Follow Up:   As planned    Recent Lab Results:  Hospital Outpatient Visit on 07/07/2022   Component Date Value Ref Range Status    WBC 07/07/2022 5.3  4.3 - 11.1 K/uL Final    RBC 07/07/2022 3.84* 4.23 - 5.6 M/uL Final    Hemoglobin 07/07/2022 12.2* 13.6 - 17.2 g/dL Final    Hematocrit 07/07/2022 35.4  % Final    MCV 07/07/2022 92.2  79.6 - 97.8 FL Final    MCH 07/07/2022 31.8  26.1 - 32.9 PG Final    MCHC 07/07/2022 34.5  31.4 - 35.0 g/dL Final    RDW 07/07/2022 15.9* 11.9 - 14.6 % Final    Platelets 74/75/9803 206  150 - 450 K/uL Final    MPV 07/07/2022 11.0  9.4 - 12.3 FL Final    nRBC 07/07/2022 0.00  0.0 - 0.2 K/uL Final    **Note: Absolute NRBC parameter is now reported with Hemogram**    Differential Type 07/07/2022 AUTOMATED    Final    Seg Neutrophils 07/07/2022 73  43 - 78 % Final    Lymphocytes 07/07/2022 14  13 - 44 % Final    Monocytes 07/07/2022 8  4.0 - 12.0 % Final    Eosinophils % 07/07/2022 2  0.5 - 7.8 % Final    Basophils 07/07/2022 1  0.0 - 2.0 % Final    Immature Granulocytes 07/07/2022 2  0.0 - 5.0 % Final    Segs Absolute 07/07/2022 3.8  1.7 - 8.2 K/UL Final    Absolute Lymph # 07/07/2022 0.7  0.5 - 4.6 K/UL Final    Absolute Mono # 07/07/2022 0.4  0.1 - 1.3 K/UL Final    Absolute Eos # 07/07/2022 0.1  0.0 - 0.8 K/UL Final    Basophils Absolute 07/07/2022 0.0  0.0 - 0.2 K/UL Final    Absolute Immature Granulocyte 07/07/2022 0.1  0.0 - 0.5 K/UL Final    Iron 07/07/2022 69  35 - 150 ug/dL Final    Comment: Known Interfering Substances section:  \"Iron values may be falsely elevated in  serum samples from patients with  anticoagulants (e.g., hemodialysis patients). \"  Limitations of Procedure section:  \"Turbidity resulting from precipitation of  fibrinogen in the serum of patients treated  with anticoagulants (e.g. hemodialysis  patients) may cause spuriously elevated  iron results. \"      TIBC 07/07/2022 316  250 - 450 ug/dL Final    TRANSFERRIN SATURATION 07/07/2022 22  >20 % Final    Ferritin 07/07/2022 223  8 - 388 NG/ML Final    Magnesium 07/07/2022 1.6* 1.8 - 2.4 mg/dL Final    Sodium 07/07/2022 141  136 - 145 mmol/L Final    Potassium 07/07/2022 3.5  3.5 - 5.1 mmol/L Final    Chloride 07/07/2022 110* 98 - 107 mmol/L Final    CO2 07/07/2022 23  21 - 32 mmol/L Final    Anion Gap 07/07/2022 8  7 - 16 mmol/L Final    Glucose 07/07/2022 87  65 - 100 mg/dL Final    BUN 07/07/2022 16  8 - 23 MG/DL Final    CREATININE 07/07/2022 1.30  0.8 - 1.5 MG/DL Final    GFR  07/07/2022 >60  >60 ml/min/1.73m2 Final    GFR Non- 07/07/2022 60* >60 ml/min/1.73m2 Final    Comment:      Estimated GFR is calculated using the Modification of Diet in Renal Disease (MDRD) Study equation, reported for both  Americans (GFRAA) and non- Americans (GFRNA), and normalized to 1.73m2 body surface area. The physician must decide which value applies to the patient. The MDRD study equation should only be used in individuals age 25 or older.  It has not been validated for the following: pregnant women, patients with serious comorbid conditions,or on certain medications, or persons with extremes of body size, muscle mass, or nutritional status.  Calcium 07/07/2022 8.1* 8.3 - 10.4 MG/DL Final    Total Bilirubin 07/07/2022 0.7  0.2 - 1.1 MG/DL Final    ALT 07/07/2022 20  12 - 65 U/L Final    AST 07/07/2022 13* 15 - 37 U/L Final    Alk Phosphatase 07/07/2022 65  50 - 136 U/L Final    Total Protein 07/07/2022 6.0* 6.3 - 8.2 g/dL Final    Albumin 07/07/2022 3.5  3.2 - 4.6 g/dL Final    Globulin 07/07/2022 2.5  2.3 - 3.5 g/dL Final    Albumin/Globulin Ratio 07/07/2022 1.4  1.2 - 3.5   Final    Phosphorus 07/07/2022 2.0* 2.3 - 3.7 MG/DL Final           Treatment Summary has been discussed and given to patient: na        -------------------------------------------------------------------------------------------------------------------  Please call our office at [phone] if you have any  of the following symptoms:   · Fever of 100.5 or greater  · Chills  · Shortness of breath  · Swelling or pain in one leg    After office hours an answering service is available and will contact a provider for emergencies or if you are experiencing any of the above symptoms.  Patient does express an interest in My Chart. My Chart log in information explained on the after visit summary printout at the [facility] [employer] desk.     Shar Rushing RN, BSN, [facility]  Hematology Nurse Navigator  phone: [phone]  cell: [phone]  fax: [phone]  Email: [email or web address] Yes

## 2022-07-28 ENCOUNTER — APPOINTMENT (OUTPATIENT)
Dept: ORTHOPEDIC SURGERY | Facility: CLINIC | Age: 62
End: 2022-07-28

## 2022-07-28 ENCOUNTER — RESULT REVIEW (OUTPATIENT)
Age: 62
End: 2022-07-28

## 2022-07-28 ENCOUNTER — OUTPATIENT (OUTPATIENT)
Dept: OUTPATIENT SERVICES | Facility: HOSPITAL | Age: 62
LOS: 1 days | End: 2022-07-28
Payer: COMMERCIAL

## 2022-07-28 VITALS
DIASTOLIC BLOOD PRESSURE: 83 MMHG | WEIGHT: 124 LBS | SYSTOLIC BLOOD PRESSURE: 133 MMHG | BODY MASS INDEX: 25 KG/M2 | HEIGHT: 59 IN

## 2022-07-28 DIAGNOSIS — Z41.9 ENCOUNTER FOR PROCEDURE FOR PURPOSES OTHER THAN REMEDYING HEALTH STATE, UNSPECIFIED: Chronic | ICD-10-CM

## 2022-07-28 DIAGNOSIS — Z98.890 OTHER SPECIFIED POSTPROCEDURAL STATES: Chronic | ICD-10-CM

## 2022-07-28 PROCEDURE — 73564 X-RAY EXAM KNEE 4 OR MORE: CPT

## 2022-07-28 PROCEDURE — 99214 OFFICE O/P EST MOD 30 MIN: CPT

## 2022-07-28 PROCEDURE — 73564 X-RAY EXAM KNEE 4 OR MORE: CPT | Mod: 26,LT,RT

## 2022-07-28 NOTE — PHYSICAL EXAM
[de-identified] : General appearance: well nourished and hydrated, pleasant, alert and oriented x 3, cooperative.  \par HEENT: normocephalic, EOM intact, wearing mask, external auditory canal clear.  \par Cardiovascular: no lower leg edema, no varicosities, dorsalis pedis pulses palpable and symmetric.  \par Lymphatics: no palpable lymphadenopathy, no lymphedema.  \par Neurologic: sensation is normal, no muscle weakness in upper or lower extremities, patella tendon reflexes present and symmetric.  \par Dermatologic: skin moist, warm, no rash.  \par Spine: cervical spine with normal lordosis and painless range of motion, thoracic spine with normal kyphosis and painless range of motion, lumbosacral spine with normal lordosis and painless range of motion. \par Gait: normal.  \par \par Right knee:\par - Inspection: mild prepatellar bursitis with fluctuance but no tenderness. Negative ecchymosis and erythema.  \par - Wounds: healed midline incision.\par - Alignment: normal.  \par - Palpation: lateral femoral condyle, moderate greater Trochanter, IT band TTP, NO TTP  over Gerdy's Tubercle, \par - ROM active: 0 - 135, no pain on extremes of motion\par - Ligamentous laxity: stable to varus stress, stable to valgus stress. About 5mm ant/post translation on flexion drawers. \par - Popliteal angle: 45 degrees\par - Muscle Test: 5/5 quad strength.   [de-identified] : 4 views of the bilateral knees (weightbearing AP, weightbearing Chisholm, weightbearing lateral, and Sunrise) were obtained today and interpreted by me.\par \par The left knee has a cemented PS Persona with resurfaced patella in position. Components appear well fixed and alignment is normal. No evidence of fracture, osteolysis, or loosening. The patella sits at appropriate height and tracks centrally. No interval change from most recent imaging.\par \par The right knee has a cemented PS Persona with resurfaced patella in position. Components appear well fixed and alignment is normal. No evidence of fracture, osteolysis, or loosening. The patella sits at appropriate height and tracks centrally. No interval change from most recent imaging.

## 2022-07-28 NOTE — HISTORY OF PRESENT ILLNESS
[de-identified] : 7/28/22: 62 y/o female presents for followup of right knee pain persisting for about 1 week. She states she heard a popping noise that concerned her. There was never an overt injury. She reports laterally based swelling of the right knee and pain with walking up stairs. Pain is localized mostly to the lateral aspect of the knee and she notes some radiating pain through the lateral thigh to the hip, with some associated midline low back pain as well.\par \par 7/1/22: 62 y/o female presents 4 years s/p  B TKA.  She states the left knee is dong very well. Reports the right knee becomes fatigued after about 15 minutes of activity. She sates she walks primarily for exercise and is not currently active in a HEP.\par \par 7/21/21: 3yr postop from B TKA, Dr. Gardner. No pain, no limitations. Still walking 10k steps. She does have some persistent bilateral prepatellar soft tissue swelling a bit worse on the right, which she attributes to constantly bumping the patella onto one particular chair in her home. 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 Gardner, DOS 8/28/18. She bumped the right patella about a week ago and had some prepatellar swelling that is gradually improving. She also complains of some right knee "heaviness" when going up and down stairs. She is still able to do 10,000 steps a day. She also tries to spend an hour per day on her stationary bike. She complains of worsening low back pain since the start of the pandemic.

## 2022-07-28 NOTE — DISCUSSION/SUMMARY
[de-identified] : 60 y/o female with right side IT band syndrome, s/p bilateral TKA 4 years ago\par - PT for ITB/AKP protocol\par - HEP\par - Meloxicam PO\par - Diclofenac topical\par - Referral to Pain Management to evaluate the low back pain\par - RTC 2 months no new XRs needed. May consider ITB injections if not sufficiently improved following PT

## 2022-09-28 ENCOUNTER — APPOINTMENT (OUTPATIENT)
Dept: ORTHOPEDIC SURGERY | Facility: CLINIC | Age: 62
End: 2022-09-28

## 2022-09-28 VITALS
HEIGHT: 59 IN | HEART RATE: 93 BPM | DIASTOLIC BLOOD PRESSURE: 78 MMHG | OXYGEN SATURATION: 97 % | SYSTOLIC BLOOD PRESSURE: 121 MMHG | WEIGHT: 124 LBS | BODY MASS INDEX: 25 KG/M2

## 2022-09-28 DIAGNOSIS — M76.31 ILIOTIBIAL BAND SYNDROME, RIGHT LEG: ICD-10-CM

## 2022-09-28 PROCEDURE — 99213 OFFICE O/P EST LOW 20 MIN: CPT

## 2022-09-29 NOTE — DISCUSSION/SUMMARY
[de-identified] : 62 y/o female with resolved episode of right iliotibial band syndrome in the setting of bilateral well-functioning TKA\par - Cont HEP\par - Followup annually with repeat bilateral knee XRs or earlier as needed

## 2022-09-29 NOTE — PHYSICAL EXAM
[de-identified] : General appearance: well nourished and hydrated, pleasant, alert and oriented x 3, cooperative.  \par HEENT: normocephalic, EOM intact, wearing mask, external auditory canal clear.  \par Cardiovascular: no lower leg edema, no varicosities, dorsalis pedis pulses palpable and symmetric.  \par Lymphatics: no palpable lymphadenopathy, no lymphedema.  \par Neurologic: sensation is normal, no muscle weakness in upper or lower extremities, patella tendon reflexes present and symmetric.  \par Dermatologic: skin moist, warm, no rash.  \par Spine: cervical spine with normal lordosis and painless range of motion, thoracic spine with normal kyphosis and painless range of motion, lumbosacral spine with normal lordosis and painless range of motion. \par Gait: normal.  \par \par Right knee:\par - Inspection: mild prepatellar bursitis with fluctuance but no tenderness. Negative ecchymosis and erythema.  \par - Wounds: healed midline incision.\par - Alignment: normal.  \par - Palpation: nontender\par - ROM active: 0 - 135, no pain on extremes of motion\par - Ligamentous laxity: stable to varus stress, stable to valgus stress. About 5mm ant/post translation on flexion drawers. \par - Popliteal angle: 45 degrees\par - Muscle Test: 5/5 quad strength.

## 2022-09-29 NOTE — HISTORY OF PRESENT ILLNESS
[de-identified] : 9/28/22: 62 y/o female presents for followup of right leg IT band syndrome. She states today that her symptoms have completely resolved and her pain level is currently at 0. She  has participated in PT noting good relief and continues to participate in the HEP. Overall she is doing well.\par \par 7/28/22: 62 y/o female presents for followup of right knee pain persisting for about 1 week. She states she heard a popping noise that concerned her. There was never an overt injury. She reports laterally based swelling of the right knee and pain with walking up stairs. Pain is localized mostly to the lateral aspect of the knee and she notes some radiating pain through the lateral thigh to the hip, with some associated midline low back pain as well.\par \par 7/1/22: 62 y/o female presents 4 years s/p B TKA. She states the left knee is dong very well. Reports the right knee becomes fatigued after about 15 minutes of activity. She sates she walks primarily for exercise and is not currently active in a HEP.\par \par 7/21/21: 3yr postop from B TKA, Dr. Gardner. No pain, no limitations. Still walking 10k steps. She does have some persistent bilateral prepatellar soft tissue swelling a bit worse on the right, which she attributes to constantly bumping the patella onto one particular chair in her home. 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 Gardner, DOS 8/28/18. She bumped the right patella about a week ago and had some prepatellar swelling that is gradually improving. She also complains of some right knee "heaviness" when going up and down stairs. She is still able to do 10,000 steps a day. She also tries to spend an hour per day on her stationary bike. She complains of worsening low back pain since the start of the pandemic.

## 2022-10-12 ENCOUNTER — APPOINTMENT (OUTPATIENT)
Dept: PHYSICAL MEDICINE AND REHAB | Facility: CLINIC | Age: 62
End: 2022-10-12

## 2022-10-14 NOTE — PATIENT PROFILE ADULT - SAFE PLACE TO LIVE
Operative Note        [facility]  [address] [facility]. [address] [place] [address]  [phone]      Name:  Lattie Epley  :  1950    Age:   66 y.o. Medical Record Number:  [id number]  Date of Procedure:  10/14/2022    Preoperative diagnosis: Anatomically Narrow Angle left eye  Postoperative diagnosis: Same  Procedure: YAG Laser Iridotomy left eye  Surgeon: Bonifacio Villagran MD   Anesthesia: Topical  Estimated Blood Loss: None  Specimens removed: None  Implants: None  Complications:None    Indications for procedure: The patient has a history of anatomically narrow angle which appears occludable. The patient is at risk of an acute angle closure glaucoma attack as well as chronic angle closure glaucoma changes. Laser peripheral iridotomy was recommended to address the anatomically narrow angle. Following discussion of all risks, benefits, and alternatives, the patient agreed to have the procedure done. Informed consent was signed. Operative Procedure: The patient was taken to the laser suite at [facility] where the operative site was confirmed. Topical ophthalmic anesthesia was instilled in the operative eye followed by brimonidine and pilocarpine. In the laser room 2 timeouts were performed to verify the correct procedure, eye and patient. An iridotomy lens with goniosol was placed on the operative eye. The YAG laser was used to treat the peripheral iris with 31 spots using a power of 4.0 mJ. The total power used was 0.12 J. The iridotomy appeared nicely patent following the procedure. The patient was given the post operative drops and asked to return to clinic in 1-2 hours for an intraocular pressure check.           Electronically signed by: Bonifacio Villagran MD,10/14/2022,8:49 AM no

## 2022-10-17 ENCOUNTER — RX RENEWAL (OUTPATIENT)
Age: 62
End: 2022-10-17

## 2022-10-17 RX ORDER — DICLOFENAC SODIUM 1% 10 MG/G
1 GEL TOPICAL
Qty: 100 | Refills: 2 | Status: ACTIVE | COMMUNITY
Start: 2022-07-28 | End: 1900-01-01

## 2022-10-17 RX ORDER — MELOXICAM 7.5 MG/1
7.5 TABLET ORAL DAILY
Qty: 30 | Refills: 2 | Status: ACTIVE | COMMUNITY
Start: 2022-07-28 | End: 1900-01-01

## 2023-01-19 NOTE — ED ADULT NURSE NOTE - VOIDING
Maribeth Busby MS, RD, CDN, [facility]; Pager #[phone] or MS Teams (preferred) 
without difficulty

## 2023-04-05 NOTE — PHYSICAL EXAM
[de-identified] : Left Knee\par Inspection: no effusion\par Wounds: healed midline incision\par Alignment: normal.\par Palpation: no specific tenderness on palpation.\par ROM: Active (in degrees) 0-130\par Ligamentous laxity (neg): negative ant. drawer test, negative post. drawer test, stable to varus stress test, stable to valgus stress test,\par Patellofemoral Alignment Test: Q angle-, normal.\par Muscle Test: good quad strength.\par Leg examination: calf is soft and non-tender.\par \par Right Knee\par Inspection: no effusion\par Wounds: healed midline incision\par Alignment: normal.\par Palpation: no specific tenderness on palpation.\par ROM: Active (in degrees) 0-130\par Ligamentous laxity (neg): negative ant. drawer test, negative post. drawer test, stable to varus stress test, stable to valgus stress test,\par Patellofemoral Alignment Test: Q angle-, normal.\par Muscle Test: good quad strength.\par Leg examination: calf is soft and non-tender.  [de-identified] : Left knee xrays,  AP, lateral, merchant view,  taken at the office today demonstrates a total knee replacement in satisfactory position and alignment. No evidence of loosening. The patella sits in a central position\par \par Right knee xrays,  AP, lateral, merchant view, taken at the office today demonstrates a total knee replacement in satisfactory position and alignment. No evidence of loosening. The patella sits in a central position  98

## 2023-04-21 NOTE — PROGRESS NOTE ADULT - SUBJECTIVE AND OBJECTIVE BOX
INTERVAL SUBJECTIVE & REVIEW OF SYMPTOMS:  No new issues overnight. Slept well. Denies  pain.        REVIEW OF SYSTEMS  [  x ] Constitutional WNL  [  x ] Cardio WNL  [  x ] Resp WNL  [  x ] GI WNL  [  x ]  WNL  [ x  ] Heme WNL    Vital Signs Last 24 Hrs  T(C): 36.7 (03 Sep 2018 06:45), Max: 36.8 (02 Sep 2018 22:37)  T(F): 98 (03 Sep 2018 06:45), Max: 98.2 (02 Sep 2018 22:37)  HR: 85 (03 Sep 2018 06:45) (82 - 86)  BP: 111/71 (03 Sep 2018 06:45) (98/68 - 111/71)  BP(mean): --  RR: 14 (03 Sep 2018 06:45) (14 - 14)  SpO2: 99% (03 Sep 2018 06:45) (99% - 99%)    PHYSICAL EXAM  General: NAD  Cardio: S1S2+  Resp: clear  Abdomen: soft, NT   Extrem: RLE some edema noted  Skin: Bilateral knee incisions: no drainage. no redness. (+)bilateral aquacel dressing            RADIOLOGY/OTHER RESULTS:      A/P:57 year old female with bilateral knee OA, now s/p bilateral TKA    S/p bilateral TKA   -Rehab:  PT/OT for 3 hours per day,    DVT ppx: Lovenox 40mg subcutaneous daily for 14 days postop, then stop and start Aspirin 325mg PO BID for 4 weeks    Pain control: Oxycodone prn, tylenol prn, celebrex 200 mg PO BID    HTN: on HCTZ, Losartan. BP 89/55 prior to breakfast and after meds. 100/67 after breakfast. Will reduce losartan to 25mg PO daily    Hyperlipidemia: on atorvastatin    Bowel: on colace, senna, miralax Yes

## 2023-05-25 NOTE — CONSULT NOTE ADULT - PROBLEM SELECTOR RECOMMENDATION 5
continue pre op rx Solaraze Counseling:  I discussed with the patient the risks of Solaraze including but not limited to erythema, scaling, itching, weeping, crusting, and pain.

## 2023-07-07 ENCOUNTER — APPOINTMENT (OUTPATIENT)
Dept: ORTHOPEDIC SURGERY | Facility: CLINIC | Age: 63
End: 2023-07-07

## 2023-08-25 ENCOUNTER — APPOINTMENT (OUTPATIENT)
Dept: MAMMOGRAPHY | Facility: HOSPITAL | Age: 63
End: 2023-08-25
Payer: MEDICARE

## 2023-08-25 ENCOUNTER — OUTPATIENT (OUTPATIENT)
Dept: OUTPATIENT SERVICES | Facility: HOSPITAL | Age: 63
LOS: 1 days | End: 2023-08-25
Payer: MEDICARE

## 2023-08-25 ENCOUNTER — APPOINTMENT (OUTPATIENT)
Dept: ULTRASOUND IMAGING | Facility: HOSPITAL | Age: 63
End: 2023-08-25
Payer: MEDICARE

## 2023-08-25 DIAGNOSIS — Z98.890 OTHER SPECIFIED POSTPROCEDURAL STATES: Chronic | ICD-10-CM

## 2023-08-25 DIAGNOSIS — Z41.9 ENCOUNTER FOR PROCEDURE FOR PURPOSES OTHER THAN REMEDYING HEALTH STATE, UNSPECIFIED: Chronic | ICD-10-CM

## 2023-08-25 DIAGNOSIS — Z12.31 ENCOUNTER FOR SCREENING MAMMOGRAM FOR MALIGNANT NEOPLASM OF BREAST: ICD-10-CM

## 2023-08-25 PROCEDURE — 76641 ULTRASOUND BREAST COMPLETE: CPT | Mod: 26,50

## 2023-08-25 PROCEDURE — 77063 BREAST TOMOSYNTHESIS BI: CPT | Mod: 26

## 2023-08-25 PROCEDURE — 77067 SCR MAMMO BI INCL CAD: CPT | Mod: 26

## 2023-08-25 PROCEDURE — 77067 SCR MAMMO BI INCL CAD: CPT

## 2023-08-25 PROCEDURE — 77063 BREAST TOMOSYNTHESIS BI: CPT

## 2023-08-25 PROCEDURE — 76641 ULTRASOUND BREAST COMPLETE: CPT

## 2024-01-12 ENCOUNTER — RESULT REVIEW (OUTPATIENT)
Age: 64
End: 2024-01-12

## 2024-01-12 ENCOUNTER — OUTPATIENT (OUTPATIENT)
Dept: OUTPATIENT SERVICES | Facility: HOSPITAL | Age: 64
LOS: 1 days | End: 2024-01-12
Payer: MEDICARE

## 2024-01-12 ENCOUNTER — APPOINTMENT (OUTPATIENT)
Dept: ORTHOPEDIC SURGERY | Facility: CLINIC | Age: 64
End: 2024-01-12
Payer: MEDICARE

## 2024-01-12 VITALS
DIASTOLIC BLOOD PRESSURE: 94 MMHG | HEART RATE: 86 BPM | SYSTOLIC BLOOD PRESSURE: 146 MMHG | HEIGHT: 59 IN | WEIGHT: 124 LBS | OXYGEN SATURATION: 98 % | BODY MASS INDEX: 25 KG/M2

## 2024-01-12 DIAGNOSIS — Z98.890 OTHER SPECIFIED POSTPROCEDURAL STATES: Chronic | ICD-10-CM

## 2024-01-12 DIAGNOSIS — Z41.9 ENCOUNTER FOR PROCEDURE FOR PURPOSES OTHER THAN REMEDYING HEALTH STATE, UNSPECIFIED: Chronic | ICD-10-CM

## 2024-01-12 PROCEDURE — 73564 X-RAY EXAM KNEE 4 OR MORE: CPT | Mod: 26,50

## 2024-01-12 PROCEDURE — 99214 OFFICE O/P EST MOD 30 MIN: CPT

## 2024-01-12 PROCEDURE — 73564 X-RAY EXAM KNEE 4 OR MORE: CPT

## 2024-01-16 NOTE — DISCUSSION/SUMMARY
[de-identified] : Imp: 64 y/o female status post bilateral total knee arthroplasty is now approximately 3 weeks status post left anterior knee contusion in the setting of mild bilateral knee global instability.  - The fall that brought her back to the office today does appear to be an isolated incident and she relates that this is her only fall in the past 5 years.  - I think that given this, we should be working to rehabilitate her injury without the need for surgery. However, she does have a new finding of bilateral knee hyperextension that must be closely monitored. - I provided her with a new referral to outpatient physical therapy as well as oral Meloxicam and topical Diclofenac to be used as needed.  - She will follow up next in 2 months with no new x-rays needed for a further clinical reevaluation.  I cautioned her that if she continues to develop ongoing instability and particularly recurrent falls, then she may require revision in one or both of her knees (poly exchange vs. femoral revision).

## 2024-01-16 NOTE — END OF VISIT
[FreeTextEntry3] : All medical record entries made by the Scribe were at my, Dr. Ran Liu, direction and personally dictated by me on 01/12/2024. I have reviewed the chart and agree that the record accurately reflects my personal performance of the history, physical exam, assessment and plan. I have also personally directed, reviewed, and agreed with the chart.

## 2024-01-16 NOTE — HISTORY OF PRESENT ILLNESS
[de-identified] : Bilateral TKA (Dr. Gardner) DOS 8/28/18 01/12/24: 64 y/o female presents about 6 years status post bilateral knee replacements. About 3 weeks status post mechanical fall in her home which she states occurred on December 24. She notes she tripped in her home and landed on both her knees. Her right knee is not symptomatic, but her left knee has been experiencing some soreness to the medial and anterior knee, worse with touching, She denies any pain with walking or instability. With walking, she does note that she had some bruising form knee down to her lower extremity, which is continuing to improve. Her pain today is about 3/10 on the left knee. She states she has been using some kind of laser treatment and Tylenol as needed, which has helped her symptoms.   9/28/22: 62 y/o female presents for followup of right leg IT band syndrome. She states today that her symptoms have completely resolved and her pain level is currently at 0. She  has participated in PT noting good relief and continues to participate in the HEP. Overall she is doing well.  7/28/22: 62 y/o female presents for followup of right knee pain persisting for about 1 week. She states she heard a popping noise that concerned her. There was never an overt injury. She reports laterally based swelling of the right knee and pain with walking up stairs. Pain is localized mostly to the lateral aspect of the knee and she notes some radiating pain through the lateral thigh to the hip, with some associated midline low back pain as well.  7/1/22: 62 y/o female presents 4 years s/p B TKA. She states the left knee is dong very well. Reports the right knee becomes fatigued after about 15 minutes of activity. She sates she walks primarily for exercise and is not currently active in a HEP.  7/21/21: 3yr postop from B TKA, Dr. Gardner. No pain, no limitations. Still walking 10k steps. She does have some persistent bilateral prepatellar soft tissue swelling a bit worse on the right, which she attributes to constantly bumping the patella onto one particular chair in her home.   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 Gardner, DOS 8/28/18. She bumped the right patella about a week ago and had some prepatellar swelling that is gradually improving. She also complains of some right knee "heaviness" when going up and down stairs. She is still able to do 10,000 steps a day. She also tries to spend an hour per day on her stationary bike. She complains of worsening low back pain since the start of the pandemic.

## 2024-01-16 NOTE — PHYSICAL EXAM
[de-identified] : General appearance: well nourished and hydrated, pleasant, alert and oriented x 3, cooperative.  HEENT: normocephalic, EOM intact, wearing mask, external auditory canal clear.  Cardiovascular: no lower leg edema, no varicosities, dorsalis pedis pulses palpable and symmetric.  Lymphatics: no palpable lymphadenopathy, no lymphedema.  Neurologic: sensation is normal, no muscle weakness in upper or lower extremities, patella tendon reflexes present and symmetric. Dermatologic: skin moist, warm, no rash.  Spine: cervical spine with normal lordosis and painless range of motion, thoracic spine with normal kyphosis and painless range of motion, lumbosacral spine with normal lordosis and painless range of motion.  No tenderness to palpation along midline spine and paraspinal musculature.  Sacroiliac joints nontender bilaterally. Negative SLR and crossed SLR tests bilaterally. Gait: Patient endores a normal gait without the use of any assistive devices today. Left knee: - Focal soft tissue swelling: none - Ecchymosis: Ecchympsis over the tibial tubercle which is tender to palpation and somewhat swollen. - Erythema: none - Effusion: small residual, no palpable Baker's cyst - Wounds: well-healed midline surgical incision, benign appearing - Alignment: normal - Tenderness: tenderness to palpation along the medial joint line  - ROM: 5 hyperextension -140 - Collateral laxity: increased laxity to both valgus and varus with firm endpoints - Cruciate laxity: none - Popliteal angle (degrees): 15 - Quad strength: 4+/5 Right knee: - Focal soft tissue swelling: none - Ecchymosis: none - Erythema: none - Effusion: small residual, no palpable Baker's cyst - Wounds: well-healed anterior surgical incision and small midline incision, benign appearing - Alignment: slight residual varus - Tenderness: Tenderness to palpation along the lateral joint line and mild tenderness to palpation along the medial joint line - ROM: 10 hyperextension -140 - Collateral laxity: mildly increased laxity to varus with a firm endpoint - Cruciate laxity: anterior and posterior drawer approximately 2mm - Popliteal angle (degrees): 10 - Quad strength: 4+/5  [de-identified] : Bilateral knee x-rays were taken today, January 12, 2024 at [facility]. These demonstrate stable appearance of her bilateral total knee arthroplasties as compared to prior imaging without evidence of mechanical complication. Patella sits at normal height and tracks centrally.

## 2024-02-08 ENCOUNTER — RX RENEWAL (OUTPATIENT)
Age: 64
End: 2024-02-08

## 2024-02-08 RX ORDER — DICLOFENAC SODIUM 1% 10 MG/G
1 GEL TOPICAL
Qty: 100 | Refills: 0 | Status: ACTIVE | COMMUNITY
Start: 2024-01-12 | End: 1900-01-01

## 2024-02-22 NOTE — H&P PST ADULT - CARDIOVASCULAR
negative Principal Discharge DX:	Symptomatic anemia   1 Regular rate & rhythm, normal S1, S2; no murmurs, gallops or rubs; no S3, S4

## 2024-03-15 ENCOUNTER — APPOINTMENT (OUTPATIENT)
Dept: ORTHOPEDIC SURGERY | Facility: CLINIC | Age: 64
End: 2024-03-15
Payer: MEDICARE

## 2024-03-15 DIAGNOSIS — Z96.653 AFTERCARE FOLLOWING JOINT REPLACEMENT SURGERY: ICD-10-CM

## 2024-03-15 DIAGNOSIS — Z47.1 AFTERCARE FOLLOWING JOINT REPLACEMENT SURGERY: ICD-10-CM

## 2024-03-15 PROCEDURE — 99213 OFFICE O/P EST LOW 20 MIN: CPT

## 2024-03-15 NOTE — PHYSICAL EXAM
[de-identified] : General appearance: well nourished and hydrated, pleasant, alert and oriented x 3, cooperative. HEENT: normocephalic, EOM intact, wearing mask, external auditory canal clear. Cardiovascular: no lower leg edema, no varicosities, dorsalis pedis pulses palpable and symmetric. Lymphatics: no palpable lymphadenopathy, no lymphedema. Neurologic: sensation is normal, no muscle weakness in upper or lower extremities, patella tendon reflexes present and symmetric. Dermatologic: skin moist, warm, no rash. Spine: cervical spine with normal lordosis and painless range of motion, thoracic spine with normal kyphosis and painless range of motion, lumbosacral spine with normal lordosis and painless range of motion.  No tenderness to palpation along midline spine and paraspinal musculature.  Sacroiliac joints nontender bilaterally. Negative SLR and crossed SLR tests bilaterally. Gait: She ambulate without the use of any assisitive device. Her gait pattern does remain somehwat cautious with a reduced stride casey. Left knee: - Focal soft tissue swelling: none - Ecchymosis: none - Erythema: none - Effusion: trace, no palpable Baker's cyst - Wounds: well-healed midline incision, benign appearing - Alignment: normal - Tenderness: none - ROM: 10 hyperextension -140 - Collateral laxity: increased laxity to valgus with a firm endpoint - Cruciate laxity: none - Meniscal signs: negative Debbie and Dipti - Popliteal angle (degrees): 15 - Quad strength: 4+/5 Right knee: - Focal soft tissue swelling: none - Ecchymosis: none - Erythema: none - Effusion: none, no palpable Baker's cyst, some palpable peripatellar bursitis - Wounds: well-healed midline incision, benign appearing - Alignment: varus - Tenderness: none - ROM:  - Collateral laxity: none - Cruciate laxity: demonstrate approximately 5mm of anterier and posterior drawer - Meniscal signs: negative Debbie and Dipti - Popliteal angle (degrees): 15 - Quad strength: 4+/5

## 2024-03-15 NOTE — HISTORY OF PRESENT ILLNESS
[de-identified] : Bilateral TKA (Dr. Gardner) DOS 8/28/18  03/15/24: 64 y/o female following up for bilateral total knee replacement status post fall at the end of December. Since we last saw each other in January she has been participating in physical therapy and noticed good improvement in her symptoms and she now feels that she is clinically back to baseline. She is not using any pain medications or anti-inflammatory issues, not ambulating with any assistive device.   01/12/24: 64 y/o female presents about 6 years status post bilateral knee replacements. About 3 weeks status post mechanical fall in her home which she states occurred on December 24. She notes she tripped in her home and landed on both her knees. Her right knee is not symptomatic, but her left knee has been experiencing some soreness to the medial and anterior knee, worse with touching, She denies any pain with walking or instability. With walking, she does note that she had some bruising form knee down to her lower extremity, which is continuing to improve. Her pain today is about 3/10 on the left knee. She states she has been using some kind of laser treatment and Tylenol as needed, which has helped her symptoms.   9/28/22: 62 y/o female presents for followup of right leg IT band syndrome. She states today that her symptoms have completely resolved and her pain level is currently at 0. She  has participated in PT noting good relief and continues to participate in the HEP. Overall she is doing well.  7/28/22: 62 y/o female presents for followup of right knee pain persisting for about 1 week. She states she heard a popping noise that concerned her. There was never an overt injury. She reports laterally based swelling of the right knee and pain with walking up stairs. Pain is localized mostly to the lateral aspect of the knee and she notes some radiating pain through the lateral thigh to the hip, with some associated midline low back pain as well.  7/1/22: 62 y/o female presents 4 years s/p B TKA. She states the left knee is dong very well. Reports the right knee becomes fatigued after about 15 minutes of activity. She sates she walks primarily for exercise and is not currently active in a HEP.  7/21/21: 3yr postop from B TKA, Dr. Scuderi. No pain, no limitations. Still walking 10k steps. She does have some persistent bilateral prepatellar soft tissue swelling a bit worse on the right, which she attributes to constantly bumping the patella onto one particular chair in her home.   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 Gardner, DOS 8/28/18. She bumped the right patella about a week ago and had some prepatellar swelling that is gradually improving. She also complains of some right knee "heaviness" when going up and down stairs. She is still able to do 10,000 steps a day. She also tries to spend an hour per day on her stationary bike. She complains of worsening low back pain since the start of the pandemic.

## 2024-03-15 NOTE — END OF VISIT
[FreeTextEntry3] : Documented by Genesis Huang acting a as a scribe for Dr. Ran Liu. ~EncMMDDY~.  All medical record entries made by the Scribe were at my, Dr. Ran Liu, direction and personally dictated by me on ~EncMMDDY~. I have reviewed the chart and agree that the record accurately reflects my personal performance of the history, physical exam, assessment and plan. I have also personally directed, reviewed, and agreed with the chart.

## 2024-03-15 NOTE — DISCUSSION/SUMMARY
[de-identified] : Imp: 64 y/o female with bilateral total knee replacements, clinically recovered from her left knee contusion now approximately 3 months status post injury with ongoing evidence of midl bilateral knee global instability.  - She has made an essentially fill clinical recovery back to baseline, though I am somewhat concerned by her bilateral knee hyperextension, she is not therefore I recommend that she continue to complete her current course of physical therapy and continue with a home exercise program thereafter.  - She will follow up annually with repeat bilateral knee x-rays, though I did encourage her to call back earlier if experiencing nay further clinical instability, buckling, or falls involving either knee, in which  case i do think we could reasonably consider a polyethylene exchange to 1 or both knees.

## 2024-04-03 ENCOUNTER — RX RENEWAL (OUTPATIENT)
Age: 64
End: 2024-04-03

## 2024-04-03 RX ORDER — MELOXICAM 7.5 MG/1
7.5 TABLET ORAL DAILY
Qty: 30 | Refills: 2 | Status: ACTIVE | COMMUNITY
Start: 2024-01-12 | End: 1900-01-01

## 2024-04-25 NOTE — PATIENT PROFILE ADULT. - CURRENT SWALLOWING
Add High Risk Medication Management Associated Diagnosis?: No Detail Level: Zone (0) swallows foods and liquids w/o difficulty

## 2024-05-06 NOTE — PHYSICAL THERAPY INITIAL EVALUATION ADULT - NAME OF CLINICIAN
Addended by: EDILBERTO ARAIZA on: 5/6/2024 01:29 PM     Modules accepted: Orders     QUINTEN Benavides

## 2024-08-06 NOTE — PATIENT PROFILE ADULT. - FUNCTIONAL LEVEL PRIOR: EATING
OUTPATIENT NEW CPE    ASSESSMENT AND PLAN  Diagnoses and associated orders for this visit:  1. Encounter for screening mammogram for malignant neoplasm of breast  -     MAMMO SCREENING BILATERAL  2. Colon cancer screening  -     OPEN ACCESS COLONOSCOPY  3. Class 1 obesity with body mass index (BMI) of 33.0 to 33.9 in adult, unspecified obesity type, unspecified whether serious comorbidity present  -     Comprehensive Metabolic Panel  -     Lipid Panel With Reflex  -     Glycohemoglobin  -     Semaglutide-Weight Management  -     Semaglutide-Weight Management  4. Health maintenance examination  5. Weight gain  -     Thyroid Stimulating Hormone Reflex  6. Thyroid enlarged  -     Ultrasound - Neck  -     Microsomal Antibody    Screening lab work ordered as well as colonoscopy and mammogram.  Do believe patient would benefit from weight loss medication.  Will send semaglutide.    Slightly enlarged thyroid on exam.  Ordering ultrasound as well as thyroid testing.    Blood pressure on the lower side however patient states this is normal for her.  No other concerns on exam    Subjective   Chief Complaint   Patient presents with    Establish Care     New Pt Visit - interested in labs     Physical       HPI:  Patient presents today to establish care.  Overall seems to be doing well.  She is interested in lab work.  History of obesity.  Currently taking no medications.  She is interested in weight loss treatment.     Reviewed patient's current medications, past medical, social and family history as well as any pertinent nursing notes.    Current Outpatient Medications   Medication Sig Dispense Refill    semaglutide-Weight Management (WEGOVY) 0.25 MG/0.5ML injection Inject 0.25 mg into the skin every 7 days. Indications: OBESITY 2 mL 0    semaglutide-Weight Management (WEGOVY) 0.5 MG/0.5ML injection Inject 0.5 mg into the skin every 7 days. Indications: OBESITY Start after finishing 0.25 mg prescription. 2 mL 3     No current  facility-administered medications for this visit.       History reviewed. No pertinent past medical history.  Past Surgical History:   Procedure Laterality Date    Bunionectomy Bilateral     Femur/knee surg unlisted      Head surgery proc unlisted      reports skull tumor removed    Shoulder surgery Right      Family History   Problem Relation Age of Onset    Thyroid Mother     Anemia Mother     Heart Father     Anemia Sister     Thyroid Paternal Aunt     Thyroid Maternal Grandmother       reports that she has never smoked. She has never used smokeless tobacco. She reports that she does not currently use alcohol. She reports that she does not use drugs.  ALLERGIES:   Allergen Reactions    Aspirin SWELLING       Review Flowsheet  More data may exist         8/6/2024   PHQ 2/9 Score   Adult PHQ 2 Score 0   Adult PHQ 2 Interpretation No further screening needed   Little interest or pleasure in activity? Not at all   Feeling down, depressed or hopeless? Not at all      Details                   Objective   Review of Systems   All other systems reviewed and are negative.      Visit Vitals  BP (!) 90/70   Pulse 61   Temp 98 °F (36.7 °C) (Temporal)   Ht 5' 6.54\" (1.69 m)   Wt 96.1 kg (211 lb 12.8 oz)   LMP 04/20/2013   SpO2 100%   BMI 33.64 kg/m²     Physical Exam  Vitals and nursing note reviewed.   Constitutional:       General: She is not in acute distress.     Appearance: She is well-developed. She is not diaphoretic.   HENT:      Head: Normocephalic and atraumatic.      Right Ear: Tympanic membrane normal.      Left Ear: Tympanic membrane normal.      Nose: Nose normal.      Mouth/Throat:      Mouth: Mucous membranes are moist.   Eyes:      General:         Right eye: No discharge.         Left eye: No discharge.      Conjunctiva/sclera: Conjunctivae normal.   Neck:      Comments: Slightly enlarged thyroid  Cardiovascular:      Rate and Rhythm: Normal rate and regular rhythm.   Pulmonary:      Effort: Pulmonary effort  is normal. No respiratory distress.      Breath sounds: Normal breath sounds.   Abdominal:      General: Abdomen is flat. Bowel sounds are normal. There is no distension.      Palpations: Abdomen is soft.   Musculoskeletal:         General: Normal range of motion.      Cervical back: Normal range of motion and neck supple.   Skin:     General: Skin is warm and dry.      Findings: No rash.   Neurological:      Mental Status: She is alert and oriented to person, place, and time.   Psychiatric:         Mood and Affect: Mood normal.         Behavior: Behavior normal.           No follow-ups on file.  Doron Patterson MD             (0) independent

## 2024-09-26 ENCOUNTER — OUTPATIENT (OUTPATIENT)
Dept: OUTPATIENT SERVICES | Facility: HOSPITAL | Age: 64
LOS: 1 days | End: 2024-09-26
Payer: MEDICARE

## 2024-09-26 ENCOUNTER — APPOINTMENT (OUTPATIENT)
Dept: ULTRASOUND IMAGING | Facility: HOSPITAL | Age: 64
End: 2024-09-26
Payer: MEDICARE

## 2024-09-26 ENCOUNTER — APPOINTMENT (OUTPATIENT)
Dept: MAMMOGRAPHY | Facility: HOSPITAL | Age: 64
End: 2024-09-26
Payer: MEDICARE

## 2024-09-26 DIAGNOSIS — Z98.890 OTHER SPECIFIED POSTPROCEDURAL STATES: Chronic | ICD-10-CM

## 2024-09-26 DIAGNOSIS — Z41.9 ENCOUNTER FOR PROCEDURE FOR PURPOSES OTHER THAN REMEDYING HEALTH STATE, UNSPECIFIED: Chronic | ICD-10-CM

## 2024-09-26 PROCEDURE — 76641 ULTRASOUND BREAST COMPLETE: CPT

## 2024-09-26 PROCEDURE — 77063 BREAST TOMOSYNTHESIS BI: CPT | Mod: 26

## 2024-09-26 PROCEDURE — 76641 ULTRASOUND BREAST COMPLETE: CPT | Mod: 26,50

## 2024-09-26 PROCEDURE — 77067 SCR MAMMO BI INCL CAD: CPT

## 2024-09-26 PROCEDURE — 77063 BREAST TOMOSYNTHESIS BI: CPT

## 2024-09-26 PROCEDURE — 77067 SCR MAMMO BI INCL CAD: CPT | Mod: 26

## 2024-09-30 DIAGNOSIS — Z12.31 ENCOUNTER FOR SCREENING MAMMOGRAM FOR MALIGNANT NEOPLASM OF BREAST: ICD-10-CM

## 2024-09-30 DIAGNOSIS — R92.30 DENSE BREASTS, UNSPECIFIED: ICD-10-CM

## 2024-09-30 DIAGNOSIS — N63.15 UNSPECIFIED LUMP IN THE RIGHT BREAST, OVERLAPPING QUADRANTS: ICD-10-CM

## 2024-09-30 DIAGNOSIS — R92.333 MAMMOGRAPHIC HETEROGENEOUS DENSITY, BILATERAL BREASTS: ICD-10-CM

## 2025-02-25 ENCOUNTER — APPOINTMENT (OUTPATIENT)
Dept: ULTRASOUND IMAGING | Facility: CLINIC | Age: 65
End: 2025-02-25
Payer: MEDICARE

## 2025-02-25 PROCEDURE — 76641 ULTRASOUND BREAST COMPLETE: CPT | Mod: RT

## 2025-03-11 ENCOUNTER — TRANSCRIPTION ENCOUNTER (OUTPATIENT)
Age: 65
End: 2025-03-11